# Patient Record
Sex: MALE | Race: WHITE | Employment: OTHER | URBAN - METROPOLITAN AREA
[De-identification: names, ages, dates, MRNs, and addresses within clinical notes are randomized per-mention and may not be internally consistent; named-entity substitution may affect disease eponyms.]

---

## 2017-01-04 ENCOUNTER — OFFICE VISIT (OUTPATIENT)
Dept: OPHTHALMOLOGY | Facility: CLINIC | Age: 77
End: 2017-01-04

## 2017-01-04 DIAGNOSIS — H40.051 OCULAR HYPERTENSION OF RIGHT EYE: ICD-10-CM

## 2017-01-04 DIAGNOSIS — H40.1121 PRIMARY OPEN ANGLE GLAUCOMA OF LEFT EYE, MILD STAGE: Primary | ICD-10-CM

## 2017-01-04 PROCEDURE — 99213 OFFICE O/P EST LOW 20 MIN: CPT | Performed by: OPHTHALMOLOGY

## 2017-01-04 PROCEDURE — G0463 HOSPITAL OUTPT CLINIC VISIT: HCPCS | Performed by: OPHTHALMOLOGY

## 2017-01-04 NOTE — ASSESSMENT & PLAN NOTE
Intraocular pressure stable, tolerating medications. Will continue same regimen of Latanoprost in both eyes at bedtime.     (he takes the drops in the left eye for glaucoma and in the right eye for ocular hypertension)   Will have patient back in 4 months

## 2017-01-04 NOTE — PROGRESS NOTES
Nasra Niece is a 78year old male. HPI:     HPI     Patient is here for an IOP check. He is taking Latanoprost OU qhs. Patient states no ocular complaints or changes.        Last edited by Liat Angeles O.T. on 1/4/2017 10:31 AM.     Patient His Problem Relation Age of Onset   • Cancer Father       age 67 lung cancer   • Stroke Father       age 67   • Lipids Brother    • Breast Cancer Mother 50   • Dementia Mother       age 80 pneumonia   • Other[other] [OTHER] Mother    • Diabetes B Pressure 20 21         Pupils      Pupils   Right PERRL   Left PERRL               Slit Lamp and Fundus Exam     Slit Lamp Exam      Right Left    Lids/Lashes Dermatochalasis, Meibomian gland dysfunction Dermatochalasis, Meibomian gland dysfunction    Co

## 2017-01-04 NOTE — PATIENT INSTRUCTIONS
Primary open angle glaucoma of left eye, mild stage   Intraocular pressure stable, tolerating medications. Will continue same regimen of Latanoprost in both eyes at bedtime.     (he takes the drops in the left eye for glaucoma and in the right eye for ocul

## 2017-01-25 ENCOUNTER — APPOINTMENT (OUTPATIENT)
Dept: LAB | Age: 77
End: 2017-01-25
Attending: INTERNAL MEDICINE
Payer: MEDICARE

## 2017-01-25 PROCEDURE — 36415 COLL VENOUS BLD VENIPUNCTURE: CPT | Performed by: INTERNAL MEDICINE

## 2017-01-25 PROCEDURE — 83036 HEMOGLOBIN GLYCOSYLATED A1C: CPT | Performed by: INTERNAL MEDICINE

## 2017-01-25 PROCEDURE — 80061 LIPID PANEL: CPT | Performed by: INTERNAL MEDICINE

## 2017-01-25 PROCEDURE — 80053 COMPREHEN METABOLIC PANEL: CPT | Performed by: INTERNAL MEDICINE

## 2017-02-21 ENCOUNTER — OFFICE VISIT (OUTPATIENT)
Dept: INTERNAL MEDICINE CLINIC | Facility: CLINIC | Age: 77
End: 2017-02-21

## 2017-02-21 VITALS
HEIGHT: 68.5 IN | TEMPERATURE: 98 F | OXYGEN SATURATION: 98 % | HEART RATE: 56 BPM | WEIGHT: 153 LBS | DIASTOLIC BLOOD PRESSURE: 56 MMHG | BODY MASS INDEX: 22.92 KG/M2 | SYSTOLIC BLOOD PRESSURE: 130 MMHG

## 2017-02-21 DIAGNOSIS — K21.9 GASTROESOPHAGEAL REFLUX DISEASE WITHOUT ESOPHAGITIS: ICD-10-CM

## 2017-02-21 DIAGNOSIS — Z12.5 PROSTATE CANCER SCREENING: ICD-10-CM

## 2017-02-21 DIAGNOSIS — Z85.46 H/O PROSTATE CANCER: ICD-10-CM

## 2017-02-21 DIAGNOSIS — E78.00 HYPERCHOLESTEROLEMIA: ICD-10-CM

## 2017-02-21 DIAGNOSIS — Z00.00 MEDICARE ANNUAL WELLNESS VISIT, SUBSEQUENT: Primary | ICD-10-CM

## 2017-02-21 DIAGNOSIS — K62.5 RECTAL BLEEDING: ICD-10-CM

## 2017-02-21 DIAGNOSIS — R73.03 PRE-DIABETES: ICD-10-CM

## 2017-02-21 PROCEDURE — G0463 HOSPITAL OUTPT CLINIC VISIT: HCPCS | Performed by: INTERNAL MEDICINE

## 2017-02-21 PROCEDURE — G0439 PPPS, SUBSEQ VISIT: HCPCS | Performed by: INTERNAL MEDICINE

## 2017-02-21 PROCEDURE — 99213 OFFICE O/P EST LOW 20 MIN: CPT | Performed by: INTERNAL MEDICINE

## 2017-02-21 RX ORDER — ROSUVASTATIN CALCIUM 40 MG/1
TABLET, COATED ORAL
Qty: 45 TABLET | Refills: 3 | Status: SHIPPED | OUTPATIENT
Start: 2017-02-21 | End: 2017-08-21

## 2017-02-21 NOTE — PROGRESS NOTES
Josefina Alves is a 78year old male who presents for     6 month check and medicare annual wellness. Feels good. Hyperlipidemia:  Tolerating crestor 40 mg 1/2 tab daily. Prediabetes: Follows diet.      Chronic lumbar disc disease:  No flares left eye 03/22/2016     on Latanoprost OU qhs   • Ocular hypertension of right eye 3/2016     taking Latanoprost OU qhs           Past Surgical History    TONSILLECTOMY  1947    Comment age 13    OTHER SURGICAL HISTORY  1980    Comment cystoscopy to remove 8.5\" (1.74 m)  Wt 153 lb (69.4 kg)  BMI 22.92 kg/m2  SpO2 98%     Wt Readings from Last 6 Encounters:  02/21/17 : 153 lb (69.4 kg)  08/29/16 : 148 lb (67.132 kg)  02/15/16 : 153 lb (69.4 kg)  02/03/16 : 156 lb (70.761 kg)  09/17/15 : 146 lb (66.225 kg)  0 M2h--atmbxkx ok. Lab-1/25/17-cmp lipid D2n-tnlqike ok. Ret in 6 mo with cbc cmp tsh lipid ua A1c psa. Patient expresses understanding of above issues and plan.          Current Outpatient Prescriptions:  Rosuvastatin Calcium (CRESTOR) 40 MG Oral Ta without help    Preparing your meals: Able without help    Managing money/bills: Able without help    Taking medications as prescribed: Able without help    Are you able to afford your medications?: Yes    Hearing Problems?: No     Functional Status     He

## 2017-03-27 RX ORDER — LATANOPROST 50 UG/ML
SOLUTION/ DROPS OPHTHALMIC
Qty: 1 BOTTLE | Refills: 11 | Status: SHIPPED | OUTPATIENT
Start: 2017-03-27 | End: 2018-01-16

## 2017-04-17 ENCOUNTER — TELEPHONE (OUTPATIENT)
Dept: INTERNAL MEDICINE CLINIC | Facility: CLINIC | Age: 77
End: 2017-04-17

## 2017-04-17 ENCOUNTER — OFFICE VISIT (OUTPATIENT)
Dept: INTERNAL MEDICINE CLINIC | Facility: CLINIC | Age: 77
End: 2017-04-17

## 2017-04-17 ENCOUNTER — HOSPITAL ENCOUNTER (OUTPATIENT)
Dept: ULTRASOUND IMAGING | Facility: HOSPITAL | Age: 77
Discharge: HOME | End: 2017-04-17
Attending: INTERNAL MEDICINE | Admitting: INTERNAL MEDICINE
Payer: MEDICARE

## 2017-04-17 VITALS
HEART RATE: 97 BPM | HEIGHT: 68 IN | OXYGEN SATURATION: 64 % | SYSTOLIC BLOOD PRESSURE: 118 MMHG | BODY MASS INDEX: 22.88 KG/M2 | RESPIRATION RATE: 18 BRPM | TEMPERATURE: 98 F | DIASTOLIC BLOOD PRESSURE: 62 MMHG | WEIGHT: 151 LBS

## 2017-04-17 DIAGNOSIS — M79.89 RIGHT LEG SWELLING: Primary | ICD-10-CM

## 2017-04-17 DIAGNOSIS — M79.89 RIGHT LEG SWELLING: ICD-10-CM

## 2017-04-17 DIAGNOSIS — M25.561 ACUTE PAIN OF RIGHT KNEE: ICD-10-CM

## 2017-04-17 PROCEDURE — 93971 EXTREMITY STUDY: CPT

## 2017-04-17 PROCEDURE — G0463 HOSPITAL OUTPT CLINIC VISIT: HCPCS | Performed by: INTERNAL MEDICINE

## 2017-04-17 PROCEDURE — 99213 OFFICE O/P EST LOW 20 MIN: CPT | Performed by: INTERNAL MEDICINE

## 2017-04-17 NOTE — TELEPHONE ENCOUNTER
Called patient who states right leg and ankle swollen, for 2 weeks discomfort , was on trip to [place] in February . Scheduled for today with DR. BAKER at 1 pm

## 2017-04-17 NOTE — TELEPHONE ENCOUNTER
Pt.'s right knee & ankle are swollen he thinks it may be a blood clot pt. Has an appt. For tomorrow at 4pm he would like to be seen today  Please advise ph.  # [phone]   Routed to clinical

## 2017-04-17 NOTE — PROGRESS NOTES
Alexa Ibarra is a 78year old male who presents for     L leg swollen. When walking at the [place] 2 wks ago in [facility], the R knee was sore behind the knee and medial aspect--was \"tight and sore\".    10 days ago, the R knee was sw Comment age 13    OTHER SURGICAL HISTORY  1980    Comment cystoscopy to remove kidney stones - comm hosp     OTHER      Comment egd - 11-99,5-02,6-07-Dr Jered Yuen    COLONOSCOPY  2007 2013    Comment Dr Jered Yuen at [facility] his derm. Pt also points out a skin lesion mole about 1/2 cm raised inhomogenous on upper mid chest.   He will see his derm Dr. Rachele Murphy. ASSESSMENT AND PLAN:     1.  Right leg swelling  - US VENOUS DOPPLER LEG RIGHT - DIAG IMG ([id number])

## 2017-05-08 PROBLEM — M23.90 INTERNAL DERANGEMENT OF KNEE: Status: ACTIVE | Noted: 2017-05-08

## 2017-05-08 PROBLEM — M94.261 CHONDROMALACIA, RIGHT KNEE: Status: ACTIVE | Noted: 2017-05-08

## 2017-05-08 PROBLEM — M25.561 ACUTE PAIN OF RIGHT KNEE: Status: ACTIVE | Noted: 2017-05-08

## 2017-05-09 ENCOUNTER — OFFICE VISIT (OUTPATIENT)
Dept: OPHTHALMOLOGY | Facility: CLINIC | Age: 77
End: 2017-05-09

## 2017-05-09 DIAGNOSIS — H43.391 FLOATER, VITREOUS, RIGHT: ICD-10-CM

## 2017-05-09 DIAGNOSIS — H40.1121 PRIMARY OPEN ANGLE GLAUCOMA OF LEFT EYE, MILD STAGE: Primary | ICD-10-CM

## 2017-05-09 DIAGNOSIS — H25.13 AGE-RELATED NUCLEAR CATARACT OF BOTH EYES: ICD-10-CM

## 2017-05-09 DIAGNOSIS — H40.051 OCULAR HYPERTENSION OF RIGHT EYE: ICD-10-CM

## 2017-05-09 PROBLEM — H43.399 FLOATER, VITREOUS: Status: ACTIVE | Noted: 2017-05-09

## 2017-05-09 PROCEDURE — 92083 EXTENDED VISUAL FIELD XM: CPT | Performed by: OPHTHALMOLOGY

## 2017-05-09 PROCEDURE — 92015 DETERMINE REFRACTIVE STATE: CPT | Performed by: OPHTHALMOLOGY

## 2017-05-09 PROCEDURE — 92133 CPTRZD OPH DX IMG PST SGM ON: CPT | Performed by: OPHTHALMOLOGY

## 2017-05-09 PROCEDURE — 92014 COMPRE OPH EXAM EST PT 1/>: CPT | Performed by: OPHTHALMOLOGY

## 2017-05-09 NOTE — ASSESSMENT & PLAN NOTE
Intraocular pressure stable, tolerating medications. Will continue same regimen of Latanoprost in both eyes at bedtime.     (he takes the drops in the left eye for glaucoma and in the right eye for ocular hypertension)   Visual field and OCT completed in

## 2017-05-09 NOTE — PROGRESS NOTES
Elenor Cowden is a 78year old male. HPI:     HPI     Patient is here for a VF, OCT and an eye exam. He is taking Latanoprost qhs OU as directed. Patient states that he has a decrease in his distance for the past year.        Last edited by Otoniel Taylor, Mark Anthony Carlisle); colonoscopy (2007) (Dr Mark Anthony Carlisle at [facility]); and remv prostate,retropub,radical () (prostatectomy for prostate cancer Dr. Noe Vázquez at [facility]).     Family History   Problem Relation Age of Onset   • Cancer Father       age 67 lung canc Base Eye Exam     Visual Acuity (Snellen - Linear)      Right Left   Dist cc 20/30 -2 20/30 -2   Dist ph cc 20/25- 20/25-   Near cc 20/25 20/20       Correction:  Glasses      Tonometry (Applanation, 10:26 AM)      Right Left   Pressure 20 22         P drops in the left eye for glaucoma and in the right eye for ocular hypertension)   Visual field and OCT completed in office today with stable results that were discussed with patient in office today.             Ocular hypertension  Continue Latanoprost in

## 2017-05-15 PROBLEM — S83.209A ACUTE MENISCAL TEAR OF KNEE: Status: ACTIVE | Noted: 2017-05-15

## 2017-05-15 PROBLEM — M17.10 PRIMARY OSTEOARTHRITIS OF ONE KNEE: Status: ACTIVE | Noted: 2017-05-15

## 2017-06-16 RX ORDER — ROSUVASTATIN CALCIUM 40 MG/1
TABLET, COATED ORAL
Qty: 45 TABLET | Refills: 3 | OUTPATIENT
Start: 2017-06-16

## 2017-06-16 RX ORDER — VALACYCLOVIR HYDROCHLORIDE 1 G/1
TABLET, FILM COATED ORAL
Qty: 8 TABLET | Refills: 1 | Status: SHIPPED | OUTPATIENT
Start: 2017-06-16 | End: 2017-08-21

## 2017-06-16 NOTE — TELEPHONE ENCOUNTER
I refilled valtrex 2 gm q 12 hours for 2 doses if needed for cold sore. #8 of 1 gm tabs with 1 refill. [facility]    Nurses had sent refill for crestor 40 mg take 1/2 tab daily #45 with 3 refills,

## 2017-06-20 ENCOUNTER — TELEPHONE (OUTPATIENT)
Dept: INTERNAL MEDICINE CLINIC | Facility: CLINIC | Age: 77
End: 2017-06-20

## 2017-06-23 NOTE — TELEPHONE ENCOUNTER
Patient called back, states he gets Crestor 40 mg tabs and cuts them in half, on 20 mg daily but 40 mg tabs are cheaper. Refill not needed at this time, sent 2/21/17 by Dayna Perez for 1 year.

## 2017-08-10 ENCOUNTER — LAB ENCOUNTER (OUTPATIENT)
Dept: LAB | Age: 77
End: 2017-08-10
Attending: INTERNAL MEDICINE
Payer: MEDICARE

## 2017-08-10 ENCOUNTER — TELEPHONE (OUTPATIENT)
Dept: INTERNAL MEDICINE CLINIC | Facility: CLINIC | Age: 77
End: 2017-08-10

## 2017-08-10 DIAGNOSIS — E78.00 HYPERCHOLESTEROLEMIA: ICD-10-CM

## 2017-08-10 DIAGNOSIS — D64.9 MILD ANEMIA: ICD-10-CM

## 2017-08-10 DIAGNOSIS — R73.03 PRE-DIABETES: ICD-10-CM

## 2017-08-10 DIAGNOSIS — Z12.5 PROSTATE CANCER SCREENING: ICD-10-CM

## 2017-08-10 DIAGNOSIS — D64.9 MILD ANEMIA: Primary | ICD-10-CM

## 2017-08-10 LAB
ALBUMIN SERPL BCP-MCNC: 3.8 G/DL (ref 3.5–4.8)
ALBUMIN/GLOB SERPL: 1.6 {RATIO} (ref 1–2)
ALP SERPL-CCNC: 48 U/L (ref 32–100)
ALT SERPL-CCNC: 26 U/L (ref 17–63)
ANION GAP SERPL CALC-SCNC: 6 MMOL/L (ref 0–18)
AST SERPL-CCNC: 29 U/L (ref 15–41)
BASOPHILS # BLD: 0 K/UL (ref 0–0.2)
BASOPHILS NFR BLD: 1 %
BILIRUB SERPL-MCNC: 0.5 MG/DL (ref 0.3–1.2)
BILIRUB UR QL: NEGATIVE
BUN SERPL-MCNC: 8 MG/DL (ref 8–20)
BUN/CREAT SERPL: 8.2 (ref 10–20)
CALCIUM SERPL-MCNC: 9 MG/DL (ref 8.5–10.5)
CHLORIDE SERPL-SCNC: 106 MMOL/L (ref 95–110)
CHOLEST SERPL-MCNC: 139 MG/DL (ref 110–200)
CLARITY UR: CLEAR
CO2 SERPL-SCNC: 28 MMOL/L (ref 22–32)
COLOR UR: YELLOW
CREAT SERPL-MCNC: 0.98 MG/DL (ref 0.5–1.5)
EOSINOPHIL # BLD: 0.2 K/UL (ref 0–0.7)
EOSINOPHIL NFR BLD: 2 %
ERYTHROCYTE [DISTWIDTH] IN BLOOD BY AUTOMATED COUNT: 13.8 % (ref 11–15)
FERRITIN SERPL IA-MCNC: 90 NG/ML (ref 24–336)
GLOBULIN PLAS-MCNC: 2.4 G/DL (ref 2.5–3.7)
GLUCOSE SERPL-MCNC: 101 MG/DL (ref 70–99)
GLUCOSE UR-MCNC: NEGATIVE MG/DL
HBA1C MFR BLD: 6 % (ref 4–6)
HCT VFR BLD AUTO: 38.5 % (ref 41–52)
HDLC SERPL-MCNC: 56 MG/DL
HGB BLD-MCNC: 13 G/DL (ref 13.5–17.5)
HGB UR QL STRIP.AUTO: NEGATIVE
HYALINE CASTS #/AREA URNS AUTO: 1 /LPF
IRON SATN MFR SERPL: 16 % (ref 20–55)
IRON SERPL-MCNC: 47 MCG/DL (ref 45–182)
KETONES UR-MCNC: NEGATIVE MG/DL
LDLC SERPL CALC-MCNC: 69 MG/DL (ref 0–99)
LEUKOCYTE ESTERASE UR QL STRIP.AUTO: NEGATIVE
LYMPHOCYTES # BLD: 2.6 K/UL (ref 1–4)
LYMPHOCYTES NFR BLD: 37 %
MCH RBC QN AUTO: 29.7 PG (ref 27–32)
MCHC RBC AUTO-ENTMCNC: 33.8 G/DL (ref 32–37)
MCV RBC AUTO: 88.1 FL (ref 80–100)
MONOCYTES # BLD: 0.6 K/UL (ref 0–1)
MONOCYTES NFR BLD: 8 %
NEUTROPHILS # BLD AUTO: 3.8 K/UL (ref 1.8–7.7)
NEUTROPHILS NFR BLD: 53 %
NITRITE UR QL STRIP.AUTO: NEGATIVE
NONHDLC SERPL-MCNC: 83 MG/DL
OSMOLALITY UR CALC.SUM OF ELEC: 288 MOSM/KG (ref 275–295)
PH UR: 8 [PH] (ref 5–8)
PLATELET # BLD AUTO: 253 K/UL (ref 140–400)
PMV BLD AUTO: 8.2 FL (ref 7.4–10.3)
POTASSIUM SERPL-SCNC: 4.4 MMOL/L (ref 3.3–5.1)
PROT SERPL-MCNC: 6.2 G/DL (ref 5.9–8.4)
PROT UR-MCNC: NEGATIVE MG/DL
PSA SERPL-MCNC: 0 NG/ML (ref 0–4)
RBC # BLD AUTO: 4.37 M/UL (ref 4.5–5.9)
RBC #/AREA URNS AUTO: 1 /HPF
SODIUM SERPL-SCNC: 140 MMOL/L (ref 136–144)
SP GR UR STRIP: 1.01 (ref 1–1.03)
TIBC SERPL-MCNC: 286 MCG/DL (ref 228–428)
TRANSFERRIN SERPL-MCNC: 217 MG/DL (ref 180–329)
TRIGL SERPL-MCNC: 72 MG/DL (ref 1–149)
TSH SERPL-ACNC: 2.05 UIU/ML (ref 0.45–5.33)
UROBILINOGEN UR STRIP-ACNC: <2
VIT B12 SERPL-MCNC: 439 PG/ML (ref 181–914)
VIT C UR-MCNC: NEGATIVE MG/DL
WBC # BLD AUTO: 7.2 K/UL (ref 4–11)
WBC #/AREA URNS AUTO: <1 /HPF

## 2017-08-10 PROCEDURE — 84443 ASSAY THYROID STIM HORMONE: CPT

## 2017-08-10 PROCEDURE — 83036 HEMOGLOBIN GLYCOSYLATED A1C: CPT

## 2017-08-10 PROCEDURE — 84466 ASSAY OF TRANSFERRIN: CPT

## 2017-08-10 PROCEDURE — 83540 ASSAY OF IRON: CPT

## 2017-08-10 PROCEDURE — 81003 URINALYSIS AUTO W/O SCOPE: CPT

## 2017-08-10 PROCEDURE — 82728 ASSAY OF FERRITIN: CPT

## 2017-08-10 PROCEDURE — 82607 VITAMIN B-12: CPT

## 2017-08-10 PROCEDURE — 85025 COMPLETE CBC W/AUTO DIFF WBC: CPT

## 2017-08-10 PROCEDURE — 80061 LIPID PANEL: CPT

## 2017-08-10 PROCEDURE — 36415 COLL VENOUS BLD VENIPUNCTURE: CPT

## 2017-08-10 PROCEDURE — 80053 COMPREHEN METABOLIC PANEL: CPT

## 2017-08-10 NOTE — TELEPHONE ENCOUNTER
I note lab results of today-8/10/17-cbc cmp tsh lipid ua psa--results ok except hgb 13- was 14 in 8/2016. check fe/tibc, ferritin. B12. Orders added to existing specimen with dx mild anemia. Pt has appt 8/21/17.

## 2017-08-21 ENCOUNTER — OFFICE VISIT (OUTPATIENT)
Dept: INTERNAL MEDICINE CLINIC | Facility: CLINIC | Age: 77
End: 2017-08-21

## 2017-08-21 ENCOUNTER — TELEPHONE (OUTPATIENT)
Dept: INTERNAL MEDICINE CLINIC | Facility: CLINIC | Age: 77
End: 2017-08-21

## 2017-08-21 VITALS
DIASTOLIC BLOOD PRESSURE: 63 MMHG | HEART RATE: 61 BPM | OXYGEN SATURATION: 98 % | WEIGHT: 149 LBS | SYSTOLIC BLOOD PRESSURE: 118 MMHG | HEIGHT: 68 IN | BODY MASS INDEX: 22.58 KG/M2 | TEMPERATURE: 98 F

## 2017-08-21 DIAGNOSIS — E78.00 HYPERCHOLESTEROLEMIA: ICD-10-CM

## 2017-08-21 DIAGNOSIS — K64.9 HEMORRHOIDS, UNSPECIFIED HEMORRHOID TYPE: ICD-10-CM

## 2017-08-21 DIAGNOSIS — D64.9 MILD ANEMIA: Primary | ICD-10-CM

## 2017-08-21 PROBLEM — M17.11 PRIMARY OSTEOARTHRITIS OF ONE KNEE, RIGHT: Status: ACTIVE | Noted: 2017-05-15

## 2017-08-21 PROBLEM — M23.91 INTERNAL DERANGEMENT OF KNEE, RIGHT: Status: ACTIVE | Noted: 2017-05-08

## 2017-08-21 PROCEDURE — 99214 OFFICE O/P EST MOD 30 MIN: CPT | Performed by: INTERNAL MEDICINE

## 2017-08-21 PROCEDURE — G0463 HOSPITAL OUTPT CLINIC VISIT: HCPCS | Performed by: INTERNAL MEDICINE

## 2017-08-21 RX ORDER — VALACYCLOVIR HYDROCHLORIDE 1 G/1
TABLET, FILM COATED ORAL
Qty: 8 TABLET | Refills: 1 | Status: SHIPPED | OUTPATIENT
Start: 2017-08-21 | End: 2018-08-24

## 2017-08-21 RX ORDER — ROSUVASTATIN CALCIUM 20 MG/1
20 TABLET, COATED ORAL NIGHTLY
Qty: 90 TABLET | Refills: 3 | Status: SHIPPED | OUTPATIENT
Start: 2017-08-21 | End: 2018-08-06

## 2017-08-21 NOTE — TELEPHONE ENCOUNTER
Nursing, please tell patient, yes, start the multivitamin with daily. Check CBC in 1 month as we discussed at his visit today. Thanks.

## 2017-08-21 NOTE — TELEPHONE ENCOUNTER
Pt wanted to let  know that his multi-vitamins do not contain iron. He bought another container that has 18 mg iron (100%DV) and he will be taking those from now on.          Tasked to nursing

## 2017-08-21 NOTE — PROGRESS NOTES
Stephenie Cardenas is a 78year old male who presents for     6 month check      Feels good.      Hyperlipidemia-Tolerating crestor 40 mg 1/2 tab daily.     Prediabetes-Follows diet.      Saw Dr Ghassan Stock for R knee pain in May.   MRI R knee 5/2017 per Dr. Cale Martinez thinning of OCT OS- treating OHT OD and COAG OS   • Prostate cancer ([address] 2000    prostatectomy at [address] [place] [address] Road   • Skin lesion of face 2015    sees derm Dr. Nura Davis in [facility]   • Superficial punctate keratitis 2010    left   • Thoracic spondy 98.4 °F (36.9 °C) (Oral)   Ht 5' 8\" (1.727 m)   Wt 149 lb (67.6 kg)   SpO2 98%   BMI 22.66 kg/m²      Wt Readings from Last 6 Encounters:  08/21/17 : 149 lb (67.6 kg)  04/17/17 : 151 lb (68.5 kg)  02/21/17 : 153 lb (69.4 kg)  08/29/16 : 148 lb (67.1 kg) up--hx of prostate CA prostatectomy in 2000.    PSA 0.0 on 8/10/17.      Lab 8/23/16- cbc cmp tsh lipid ua M3y--lnejehy ok. Lab-1/25/17-cmp lipid A5s-unyrsyy ok. Lab 8/10/17-cbc cmp tsh lipid ua psa--results ok except hgb 13- was 14 in 8/2016.   fe sat

## 2017-08-22 ENCOUNTER — TELEPHONE (OUTPATIENT)
Dept: INTERNAL MEDICINE CLINIC | Facility: CLINIC | Age: 77
End: 2017-08-22

## 2017-08-22 NOTE — TELEPHONE ENCOUNTER
Message noted--next colonoscopy in 5 years. Colonoscopy at [facility] Dr. Fozia Mays 8/7/17--2 gwyn polyps, int hem and diverticulosis.

## 2017-08-22 NOTE — TELEPHONE ENCOUNTER
Pt got the results from his colonoscopy - everything is OK. His next one will be in 5 years.            Tasked to nursing as Marie Saunders

## 2017-09-12 ENCOUNTER — OFFICE VISIT (OUTPATIENT)
Dept: OPHTHALMOLOGY | Facility: CLINIC | Age: 77
End: 2017-09-12

## 2017-09-12 DIAGNOSIS — H40.1121 PRIMARY OPEN ANGLE GLAUCOMA OF LEFT EYE, MILD STAGE: Primary | ICD-10-CM

## 2017-09-12 DIAGNOSIS — H40.051 OCULAR HYPERTENSION OF RIGHT EYE: ICD-10-CM

## 2017-09-12 PROCEDURE — 99213 OFFICE O/P EST LOW 20 MIN: CPT | Performed by: OPHTHALMOLOGY

## 2017-09-12 PROCEDURE — G0463 HOSPITAL OUTPT CLINIC VISIT: HCPCS | Performed by: OPHTHALMOLOGY

## 2017-09-12 NOTE — PROGRESS NOTES
Tuyet Eric is a 78year old male. HPI:     HPI     Pt is here for a 4 month IOP check. Pt is using Lantanoprost OU qhs and does not need any refills at this time. Pt denies any ocular complaints and he is happy with his glasses.     Last edited by (age 13); other surgical history (1980) (cystoscopy to remove kidney stones -[facility] hosp ); other (egd - 11-99,5-02,6-07-Dr Sherry Carver); colonoscopy (2007 2013) (Dr Sherry Carver at [facility]); and remv prostate,retropub,radical (2000) (prostatectomy for prostate ca (CENTRUM SILVER OR) Take  by mouth daily. Disp:  Rfl:    Lansoprazole (PREVACID) 15 MG Oral Capsule Delayed Release Take 15 mg by mouth daily. Disp:  Rfl:    Aspirin (ECOTRIN LOW STRENGTH) 81 MG Oral Tab EC Take 81 mg by mouth daily.  Disp:  Rfl:        All placed in this encounter. Meds This Visit:    No prescriptions requested or ordered in this encounter     Follow up instructions:  Return in about 4 months (around 1/12/2018) for IOP check.     9/12/2017  Scribed by: Patsie Nageotte, MD

## 2017-09-12 NOTE — ASSESSMENT & PLAN NOTE
Intraocular pressure stable, tolerating medications. Will continue same regimen of Latanoprost in both eyes at bedtime.     (he takes the drops in the left eye for glaucoma and in the right eye for ocular hypertension)

## 2017-09-25 ENCOUNTER — IMMUNIZATION (OUTPATIENT)
Dept: INTERNAL MEDICINE CLINIC | Facility: CLINIC | Age: 77
End: 2017-09-25

## 2017-09-25 DIAGNOSIS — Z23 NEED FOR VACCINATION: ICD-10-CM

## 2017-09-25 PROCEDURE — G0008 ADMIN INFLUENZA VIRUS VAC: HCPCS | Performed by: INTERNAL MEDICINE

## 2017-09-25 PROCEDURE — 90653 IIV ADJUVANT VACCINE IM: CPT | Performed by: INTERNAL MEDICINE

## 2017-09-29 ENCOUNTER — TELEPHONE (OUTPATIENT)
Dept: INTERNAL MEDICINE CLINIC | Facility: CLINIC | Age: 77
End: 2017-09-29

## 2017-09-29 NOTE — TELEPHONE ENCOUNTER
To nursing tell patient--please call [phone]---that I filled out the medical advisor's opinion form for [facility] [employer] [facility] to [place]. (pt leaving 2/4/18). He said he would  the form. Please keep a copy for Epic. Thanks.

## 2017-10-02 ENCOUNTER — LAB ENCOUNTER (OUTPATIENT)
Dept: LAB | Age: 77
End: 2017-10-02
Attending: INTERNAL MEDICINE
Payer: MEDICARE

## 2017-10-02 DIAGNOSIS — D64.9 MILD ANEMIA: ICD-10-CM

## 2017-10-02 PROCEDURE — 36415 COLL VENOUS BLD VENIPUNCTURE: CPT

## 2017-10-02 PROCEDURE — 85025 COMPLETE CBC W/AUTO DIFF WBC: CPT

## 2017-10-02 NOTE — TELEPHONE ENCOUNTER
Advised patient form is ready for PU. Patient request form to be faxed to [phone]. Form faxed, conformation received. Copy sent to scan. Original placed at  for PU.

## 2017-10-03 ENCOUNTER — TELEPHONE (OUTPATIENT)
Dept: INTERNAL MEDICINE CLINIC | Facility: CLINIC | Age: 77
End: 2017-10-03

## 2017-10-03 NOTE — TELEPHONE ENCOUNTER
Dr Orellana Cons can you advise on which test/lab results or notes would be beneficial for Dr Shahla Abraham office?   Fax [phone]

## 2017-10-03 NOTE — TELEPHONE ENCOUNTER
To nursing, send results to Dr. Smita Stephenson of CBC 10/2/17 and 8/20/17 and 8/23/16. Also iron, TIBC iron saturation, transferrin, vitamin B12 and ferritin results of 8/10/17. Also a copy of this telephone template. Thanks.

## 2017-10-03 NOTE — TELEPHONE ENCOUNTER
The following has been faxed to Dr. Yanci Ballesteros office at [phone]  - CBC - 10/2/17, 08/10/17, 08/23/16  - Iron, TIBC Iron Saturation, Transferrin, Vitamin b12, and Ferritin - 8/10/17  - Copy of this telephone template    Fax confirmation received.

## 2017-10-03 NOTE — TELEPHONE ENCOUNTER
Called patient and relayed Dr Lavelle Regalado message. Patient verbalized understanding of entire message with no further questions.

## 2017-10-03 NOTE — TELEPHONE ENCOUNTER
To nursing, please tell patient CBC done on 10/2/17– results are normal.  There is no anemia. His hemoglobin is 13.8 up from 13.0 on 8/10/17. Continue the multivitamin with iron daily.     Since he responded well to iron, and he has already had a recent c

## 2018-01-02 ENCOUNTER — OFFICE VISIT (OUTPATIENT)
Dept: INTERNAL MEDICINE CLINIC | Facility: CLINIC | Age: 78
End: 2018-01-02

## 2018-01-02 VITALS
BODY MASS INDEX: 23.49 KG/M2 | SYSTOLIC BLOOD PRESSURE: 120 MMHG | TEMPERATURE: 98 F | HEIGHT: 68 IN | HEART RATE: 72 BPM | WEIGHT: 155 LBS | DIASTOLIC BLOOD PRESSURE: 68 MMHG

## 2018-01-02 DIAGNOSIS — M54.31 SCIATICA, RIGHT SIDE: Primary | ICD-10-CM

## 2018-01-02 PROCEDURE — 99213 OFFICE O/P EST LOW 20 MIN: CPT | Performed by: INTERNAL MEDICINE

## 2018-01-02 PROCEDURE — G0463 HOSPITAL OUTPT CLINIC VISIT: HCPCS | Performed by: INTERNAL MEDICINE

## 2018-01-02 RX ORDER — METHYLPREDNISOLONE 4 MG/1
TABLET ORAL
Qty: 1 KIT | Refills: 0 | Status: SHIPPED | OUTPATIENT
Start: 2018-01-02 | End: 2018-01-15 | Stop reason: ALTCHOICE

## 2018-01-02 NOTE — PROGRESS NOTES
Olvin Cross is a 78year old male who presents for     Right sciatica  R buttock pain started a month ago--was only occasional. After [place], \"the frequency has picked up. \"  Travels down R leg to knee. No numbness or tingling. Not tried any meds. Alexander De La Fuente   • Skin lesion of face 2015    sees derm Dr. Jason Warner in [facility]   • Superficial punctate keratitis 2010    left   • Thoracic spondylosis       Past Surgical History:  2007 2013: COLONOSCOPY      Comment: Dr Deondre Herron at [facility]  No date: OTHER spine but pain at extreme flexion. LE's motor intact. SLR neg. Neurologic: alert and oriented                  ASSESSMENT AND PLAN:     Sciatica, right side  Rx medrol dose pack. Pt notes medrol helped in the past.    Order given for PT.    Discussed

## 2018-01-10 ENCOUNTER — TELEPHONE (OUTPATIENT)
Dept: INTERNAL MEDICINE CLINIC | Facility: CLINIC | Age: 78
End: 2018-01-10

## 2018-01-10 ENCOUNTER — HOSPITAL ENCOUNTER (OUTPATIENT)
Dept: MRI IMAGING | Facility: HOSPITAL | Age: 78
Discharge: HOME | End: 2018-01-10
Attending: ORTHOPAEDIC SURGERY
Payer: MEDICARE

## 2018-01-10 DIAGNOSIS — M51.36 DDD (DEGENERATIVE DISC DISEASE), LUMBAR: ICD-10-CM

## 2018-01-10 DIAGNOSIS — M51.26 BULGING OF LUMBAR INTERVERTEBRAL DISC: ICD-10-CM

## 2018-01-10 PROCEDURE — 72148 MRI LUMBAR SPINE W/O DYE: CPT | Performed by: ORTHOPAEDIC SURGERY

## 2018-01-10 NOTE — TELEPHONE ENCOUNTER
Pt. Stated he got up Friday and couldn't walk. Pt. Saw Dr. Yazan Davison on Monday he ordered x-rays & a MRI  Pt. States pain has subsided but he is still having trouble walking around ph.  # [phone]   Routed to clinical

## 2018-01-10 NOTE — TELEPHONE ENCOUNTER
I note message from patient. Saw Dr. Zuleta Persons who ordered MRI lumbar for R sciatica. I note MRI lumbar spine results from today 1/10/18–  1.  Mild scoliosis and multilevel degenerative changes of the lumbar spine with moderate to severe spinal canal sten

## 2018-01-10 NOTE — TELEPHONE ENCOUNTER
To DR OLIVIA DOCKERY   Pt had Mri today   Results should be ready on FR  Has appt with DR Jimena Malin on Mon   Will call DR BAKER  On Mon afternoon to keep DR BAKER  in the loop re Dr Jimena Malin rec

## 2018-01-15 PROBLEM — M48.062 SPINAL STENOSIS OF LUMBAR REGION WITH NEUROGENIC CLAUDICATION: Status: ACTIVE | Noted: 2018-01-15

## 2018-01-16 ENCOUNTER — OFFICE VISIT (OUTPATIENT)
Dept: OPHTHALMOLOGY | Facility: CLINIC | Age: 78
End: 2018-01-16

## 2018-01-16 DIAGNOSIS — H40.051 OCULAR HYPERTENSION OF RIGHT EYE: ICD-10-CM

## 2018-01-16 DIAGNOSIS — H40.1121 PRIMARY OPEN ANGLE GLAUCOMA OF LEFT EYE, MILD STAGE: Primary | ICD-10-CM

## 2018-01-16 PROCEDURE — G0463 HOSPITAL OUTPT CLINIC VISIT: HCPCS | Performed by: OPHTHALMOLOGY

## 2018-01-16 PROCEDURE — 99213 OFFICE O/P EST LOW 20 MIN: CPT | Performed by: OPHTHALMOLOGY

## 2018-01-16 RX ORDER — LATANOPROST 50 UG/ML
SOLUTION/ DROPS OPHTHALMIC
Qty: 3 BOTTLE | Refills: 3 | Status: SHIPPED | OUTPATIENT
Start: 2018-01-16 | End: 2018-09-26

## 2018-01-16 NOTE — PROGRESS NOTES
Alma Toledo is a 78year old male. HPI:     HPI     Pt is using Latanoprost OU qhs as directed. Pt states that his vision is stable and has no ocular complaints.      Last edited by Gabriel Brar OTERESA on 1/16/2018 11:02 AM. (History)        Munir Manriquez kidney stones -[place] [facility] ); other (egd - 11-99,5-02,6-07-Dr Mitch Vigil); colonoscopy (2007 2013) (Dr Mitch Vigil at [facility]); and remv prostate,retropub,radical (2000) (prostatectomy for prostate cancer Dr. Meri Sharpe at [place]).     Family History   Problem Rel MG Oral Tab EC Take 81 mg by mouth daily.  Disp:  Rfl:        Allergies:  No Known Allergies    ROS:       PHYSICAL EXAM:     Base Eye Exam     Visual Acuity (Snellen - Linear)       Right Left    Dist cc 20/25 20/25    Correction:  Glasses          Tonomet photos.     1/16/2018  Scribed by: Calista Estrada MD

## 2018-01-18 PROBLEM — M43.16 SPONDYLOLISTHESIS AT L4-L5 LEVEL: Status: ACTIVE | Noted: 2018-01-18

## 2018-01-22 ENCOUNTER — TELEPHONE (OUTPATIENT)
Dept: INTERNAL MEDICINE CLINIC | Facility: CLINIC | Age: 78
End: 2018-01-22

## 2018-01-22 ENCOUNTER — TELEPHONE (OUTPATIENT)
Dept: OPHTHALMOLOGY | Facility: CLINIC | Age: 78
End: 2018-01-22

## 2018-01-22 RX ORDER — SCOLOPAMINE TRANSDERMAL SYSTEM 1 MG/1
1 PATCH, EXTENDED RELEASE TRANSDERMAL
Qty: 4 PATCH | Refills: 0 | Status: SHIPPED | OUTPATIENT
Start: 2018-01-22 | End: 2018-02-26

## 2018-01-22 NOTE — TELEPHONE ENCOUNTER
Patient had gonioscopy on 4/19/16 with results of open angles to 360 degrees OU. Routed to physician.

## 2018-01-22 NOTE — TELEPHONE ENCOUNTER
Pt. Is calling to inform Dr. Emili Jansen that he had his injection last Thursday and it is helping his second injection is on 01/30. Pt. Will start PT three days this week & 3 days next week.  Pt. Will be going to [place] and needs to be prescribed a patch for se

## 2018-01-22 NOTE — TELEPHONE ENCOUNTER
To nursing, please tell patient I sent to [facility] in [place] prescription for Transderm scop patches–apply a patch behind ear every 3 days if needed for motion sickness. #4.  Thanks.

## 2018-01-23 NOTE — TELEPHONE ENCOUNTER
Per Dr. Emmie Vazquez. No contraindication to Dramamine. I relayed this information to Dr. Emmie Vazquez.

## 2018-02-20 ENCOUNTER — TELEPHONE (OUTPATIENT)
Dept: INTERNAL MEDICINE CLINIC | Facility: CLINIC | Age: 78
End: 2018-02-20

## 2018-02-20 ENCOUNTER — LAB ENCOUNTER (OUTPATIENT)
Dept: LAB | Age: 78
End: 2018-02-20
Attending: INTERNAL MEDICINE
Payer: MEDICARE

## 2018-02-20 DIAGNOSIS — E78.00 HYPERCHOLESTEROLEMIA: Primary | ICD-10-CM

## 2018-02-20 DIAGNOSIS — R73.03 PREDIABETES: ICD-10-CM

## 2018-02-20 DIAGNOSIS — E78.00 HYPERCHOLESTEROLEMIA: ICD-10-CM

## 2018-02-20 LAB
ALBUMIN SERPL BCP-MCNC: 4.3 G/DL (ref 3.5–4.8)
ALBUMIN/GLOB SERPL: 1.9 {RATIO} (ref 1–2)
ALP SERPL-CCNC: 50 U/L (ref 32–100)
ALT SERPL-CCNC: 21 U/L (ref 17–63)
ANION GAP SERPL CALC-SCNC: 9 MMOL/L (ref 0–18)
AST SERPL-CCNC: 22 U/L (ref 15–41)
BASOPHILS # BLD: 0 K/UL (ref 0–0.2)
BASOPHILS NFR BLD: 0 %
BILIRUB SERPL-MCNC: 0.7 MG/DL (ref 0.3–1.2)
BUN SERPL-MCNC: 16 MG/DL (ref 8–20)
BUN/CREAT SERPL: 15.4 (ref 10–20)
CALCIUM SERPL-MCNC: 9.1 MG/DL (ref 8.5–10.5)
CHLORIDE SERPL-SCNC: 101 MMOL/L (ref 95–110)
CHOLEST SERPL-MCNC: 186 MG/DL (ref 110–200)
CO2 SERPL-SCNC: 26 MMOL/L (ref 22–32)
CREAT SERPL-MCNC: 1.04 MG/DL (ref 0.5–1.5)
EOSINOPHIL # BLD: 0.1 K/UL (ref 0–0.7)
EOSINOPHIL NFR BLD: 1 %
ERYTHROCYTE [DISTWIDTH] IN BLOOD BY AUTOMATED COUNT: 13.9 % (ref 11–15)
GLOBULIN PLAS-MCNC: 2.3 G/DL (ref 2.5–3.7)
GLUCOSE SERPL-MCNC: 104 MG/DL (ref 70–99)
HBA1C MFR BLD: 5.8 % (ref 4–6)
HCT VFR BLD AUTO: 43 % (ref 41–52)
HDLC SERPL-MCNC: 62 MG/DL
HGB BLD-MCNC: 14.5 G/DL (ref 13.5–17.5)
LDLC SERPL CALC-MCNC: 107 MG/DL (ref 0–99)
LYMPHOCYTES # BLD: 2.6 K/UL (ref 1–4)
LYMPHOCYTES NFR BLD: 40 %
MCH RBC QN AUTO: 30 PG (ref 27–32)
MCHC RBC AUTO-ENTMCNC: 33.8 G/DL (ref 32–37)
MCV RBC AUTO: 88.8 FL (ref 80–100)
MONOCYTES # BLD: 0.5 K/UL (ref 0–1)
MONOCYTES NFR BLD: 8 %
NEUTROPHILS # BLD AUTO: 3.3 K/UL (ref 1.8–7.7)
NEUTROPHILS NFR BLD: 51 %
NONHDLC SERPL-MCNC: 124 MG/DL
OSMOLALITY UR CALC.SUM OF ELEC: 283 MOSM/KG (ref 275–295)
PATIENT FASTING: YES
PLATELET # BLD AUTO: 263 K/UL (ref 140–400)
PMV BLD AUTO: 7.6 FL (ref 7.4–10.3)
POTASSIUM SERPL-SCNC: 4.6 MMOL/L (ref 3.3–5.1)
PROT SERPL-MCNC: 6.6 G/DL (ref 5.9–8.4)
RBC # BLD AUTO: 4.84 M/UL (ref 4.5–5.9)
SODIUM SERPL-SCNC: 136 MMOL/L (ref 136–144)
TRIGL SERPL-MCNC: 86 MG/DL (ref 1–149)
WBC # BLD AUTO: 6.5 K/UL (ref 4–11)

## 2018-02-20 PROCEDURE — 80053 COMPREHEN METABOLIC PANEL: CPT

## 2018-02-20 PROCEDURE — 36415 COLL VENOUS BLD VENIPUNCTURE: CPT

## 2018-02-20 PROCEDURE — 83036 HEMOGLOBIN GLYCOSYLATED A1C: CPT

## 2018-02-20 PROCEDURE — 85025 COMPLETE CBC W/AUTO DIFF WBC: CPT

## 2018-02-20 PROCEDURE — 80061 LIPID PANEL: CPT

## 2018-02-20 NOTE — TELEPHONE ENCOUNTER
lab orders entered before 2/27/18 appt-cbc cmp lipid A1c. Ashley Talamantes RN is notifying phlebotomy that orders are in.     1. Hypercholesterolemia  - CBC WITH DIFFERENTIAL WITH PLATELET; Future  - COMP METABOLIC PANEL (14); Future  - LIPID PANEL; Future    2.  Pr

## 2018-02-20 NOTE — TELEPHONE ENCOUNTER
Patient presented to the lab for blood work. No lab orers in the system. Medicare physical scheduled for 2/27/18.

## 2018-02-27 ENCOUNTER — OFFICE VISIT (OUTPATIENT)
Dept: INTERNAL MEDICINE CLINIC | Facility: CLINIC | Age: 78
End: 2018-02-27

## 2018-02-27 VITALS
SYSTOLIC BLOOD PRESSURE: 120 MMHG | TEMPERATURE: 98 F | DIASTOLIC BLOOD PRESSURE: 54 MMHG | HEART RATE: 76 BPM | HEIGHT: 68 IN | BODY MASS INDEX: 23.64 KG/M2 | WEIGHT: 156 LBS

## 2018-02-27 DIAGNOSIS — E78.00 HYPERCHOLESTEROLEMIA: ICD-10-CM

## 2018-02-27 DIAGNOSIS — M48.061 SPINAL STENOSIS OF LUMBAR REGION WITHOUT NEUROGENIC CLAUDICATION: ICD-10-CM

## 2018-02-27 DIAGNOSIS — Z85.46 H/O PROSTATE CANCER: ICD-10-CM

## 2018-02-27 DIAGNOSIS — Z00.00 MEDICARE ANNUAL WELLNESS VISIT, SUBSEQUENT: Primary | ICD-10-CM

## 2018-02-27 DIAGNOSIS — K21.9 GASTROESOPHAGEAL REFLUX DISEASE WITHOUT ESOPHAGITIS: ICD-10-CM

## 2018-02-27 DIAGNOSIS — R73.03 PREDIABETES: ICD-10-CM

## 2018-02-27 DIAGNOSIS — Z12.5 PROSTATE CANCER SCREENING: ICD-10-CM

## 2018-02-27 PROCEDURE — 99214 OFFICE O/P EST MOD 30 MIN: CPT | Performed by: INTERNAL MEDICINE

## 2018-02-27 PROCEDURE — G0439 PPPS, SUBSEQ VISIT: HCPCS | Performed by: INTERNAL MEDICINE

## 2018-02-27 PROCEDURE — G0463 HOSPITAL OUTPT CLINIC VISIT: HCPCS | Performed by: INTERNAL MEDICINE

## 2018-02-27 NOTE — PROGRESS NOTES
Tez Glass is a 70year old male who presents for     Medicare annual wellness    Returned from a trip to [facility]. R low back pain w sciatica follow up--is better. Was able to make the trip to [facility]. Saw Dr. Zuleta Persons.  He had pt do MRI lizabeth left eye 3/8/16    3/8/16 started on Latanoprost qhs OU by PPS- due to [address] OU (36/41)and thinning of OCT OS- treating OHT OD and COAG OS   • Prostate cancer ([address] 2000    prostatectomy at [facility]   • Skin lesion of face 2015    sees derm D bothering. Genitourinary:  No dysuria or blood in the urine  Dermatologic:  No rash or itching.     EXAM:   /54   Pulse 76   Temp 98.1 °F (36.7 °C) (Oral)   Ht 5' 8\" (1.727 m)   Wt 156 lb (70.8 kg)   BMI 23.72 kg/m²      Wt Readings from Last 6 Enc diverticulosis . Next at 5 yrs.    PSA--per pt Dr Chandni Ceja at [facility] said not need further follow up--hx of prostate CA prostatectomy in 2000.    PSA 0.0 on 8/10/17.     Takes occas valtrex for cold sores.      Lab 8/23/16- cbc cmp tsh lipid ua M2m--aovtdap o maintain a good energy level?: Daily Walks    How would you describe your daily physical activity?: Moderate    How would you describe your current health state?: Good    How do you maintain positive mental well-being?: Social Interaction    If you are a m ring or harshad:  No I have trouble hearing conversations in a noisy background such as a crowded room or restaurant:  No   I get confused about where sounds come from:  No I misunderstand some words in a sentence and need to ask people to repeat themselv

## 2018-02-28 PROBLEM — M51.369 BULGING OF LUMBAR INTERVERTEBRAL DISC: Status: ACTIVE | Noted: 2018-02-28

## 2018-02-28 PROBLEM — M51.36 BULGING OF LUMBAR INTERVERTEBRAL DISC: Status: ACTIVE | Noted: 2018-02-28

## 2018-02-28 PROBLEM — M43.16 SPONDYLOLISTHESIS AT L4-L5 LEVEL: Status: ACTIVE | Noted: 2018-02-28

## 2018-02-28 PROBLEM — M51.36 DDD (DEGENERATIVE DISC DISEASE), LUMBAR: Status: ACTIVE | Noted: 2018-02-28

## 2018-02-28 PROBLEM — M48.062 SPINAL STENOSIS OF LUMBAR REGION WITH NEUROGENIC CLAUDICATION: Status: ACTIVE | Noted: 2018-02-27

## 2018-02-28 PROBLEM — M51.369 DDD (DEGENERATIVE DISC DISEASE), LUMBAR: Status: ACTIVE | Noted: 2018-02-28

## 2018-02-28 PROBLEM — M51.26 BULGING OF LUMBAR INTERVERTEBRAL DISC: Status: ACTIVE | Noted: 2018-02-28

## 2018-03-21 ENCOUNTER — OFFICE VISIT (OUTPATIENT)
Dept: PAIN CLINIC | Facility: HOSPITAL | Age: 78
End: 2018-03-21
Attending: ORTHOPAEDIC SURGERY
Payer: MEDICARE

## 2018-03-21 VITALS — HEART RATE: 75 BPM | DIASTOLIC BLOOD PRESSURE: 73 MMHG | SYSTOLIC BLOOD PRESSURE: 139 MMHG

## 2018-03-21 DIAGNOSIS — M48.062 SPINAL STENOSIS OF LUMBAR REGION WITH NEUROGENIC CLAUDICATION: ICD-10-CM

## 2018-03-21 DIAGNOSIS — M51.26 BULGING OF LUMBAR INTERVERTEBRAL DISC: ICD-10-CM

## 2018-03-21 DIAGNOSIS — M43.16 SPONDYLOLISTHESIS AT L4-L5 LEVEL: ICD-10-CM

## 2018-03-21 DIAGNOSIS — M51.36 DDD (DEGENERATIVE DISC DISEASE), LUMBAR: ICD-10-CM

## 2018-03-21 PROCEDURE — 99201 HC OUTPT EVAL AND MGNT NEW PT LEVEL 1: CPT | Performed by: NURSE PRACTITIONER

## 2018-03-21 NOTE — PROGRESS NOTES
Pt is looking for a new pain clinic. He used to see Dr Chuy Abbasi but is unable to continue to see him. He recently went to see Dr Richard Parr for an injection on January 14 and 28. He is currently doing PT and gets injections once or twice a year.  Current pain is 1/

## 2018-03-21 NOTE — CHRONIC PAIN
[facility]  Pain Consultation     HISTORY OF PRESENT ILLNESS:  Tyrone Wolfe is a 70year old old male referred to the pain clinic by Dr. Esther Mart for Spinal stenosis of lumbar region with neurogenic claudication  DDD (degenerative disc 200 MG Oral Tab Take  by mouth as needed. Disp:  Rfl:    Multiple Vitamins-Minerals (CENTRUM SILVER OR) Take  by mouth daily. Disp:  Rfl:    Lansoprazole (PREVACID) 15 MG Oral Capsule Delayed Release Take 15 mg by mouth daily.  Disp:  Rfl:    Aspirin (Rollen [place]ard Diagnosis Date   • Abnormal MRI, knee 05/2017    MRI R knee 5/2017 per Dr. Cale Adrian meniscal tear and MCL sprain. physical therapy.    • Allergic rhinitis    • Cancer [facility]) 2000    prostate   • Colon polyps ~2007    colonoscopy Dr. Crenshaw August 2007 and [OTHER] Brother    • 1 brother[other] [OTHER] Other    • Breast Cancer Daughter 39     DCIS-had double mastectomy   • Glaucoma Neg    • Macular degeneration Neg        SOCIAL HISTORY:    Social History  Social History   Marital status:   Spouse name RIGHT   Dorsalis Pedis 2/4 2/4   Posterior Tibial 2/4 2/4          SIJ tenderness negative    SLR: negative  Geri's test: negative    Right Knee Deep tendon reflexes: Normal   Right Ankle Deep tendon reflexes: Normal     Left Knee Deep tendon reflexes: No intensity. PARASPINAL AREA:     No visible mass. OTHER:             Layering calculi or debris suggested within the gallbladder lumen. Scattered colonic diverticula are present in the sigmoid colon.  There is no colonic wall thickening or pericolonic fa impingement. Dictated by (CST): Selena Rosenthal MD on 1/10/2018 at 13:21       Approved by (CST): Selena Rosenthal MD on 1/10/2018 at 13:33          =====  CONCLUSION:   1.  Mild scoliosis and multilevel degenerative changes of the lumbar spine with m will return to clinic as needed

## 2018-04-25 ENCOUNTER — OFFICE VISIT (OUTPATIENT)
Dept: NEUROLOGY | Facility: CLINIC | Age: 78
End: 2018-04-25

## 2018-04-25 VITALS
SYSTOLIC BLOOD PRESSURE: 124 MMHG | WEIGHT: 156 LBS | BODY MASS INDEX: 23.64 KG/M2 | DIASTOLIC BLOOD PRESSURE: 64 MMHG | HEART RATE: 64 BPM | RESPIRATION RATE: 16 BRPM | HEIGHT: 68 IN

## 2018-04-25 DIAGNOSIS — M51.26 LUMBAR HERNIATED DISC: ICD-10-CM

## 2018-04-25 DIAGNOSIS — M43.16 SPONDYLOLISTHESIS AT L4-L5 LEVEL: Primary | ICD-10-CM

## 2018-04-25 DIAGNOSIS — M48.061 LUMBAR FORAMINAL STENOSIS: ICD-10-CM

## 2018-04-25 DIAGNOSIS — M51.9 LUMBAR DISC DISEASE: ICD-10-CM

## 2018-04-25 DIAGNOSIS — M54.16 LUMBAR RADICULOPATHY: ICD-10-CM

## 2018-04-25 DIAGNOSIS — M48.061 SPINAL STENOSIS OF LUMBAR REGION WITHOUT NEUROGENIC CLAUDICATION: ICD-10-CM

## 2018-04-25 PROBLEM — M51.36 BULGING OF LUMBAR INTERVERTEBRAL DISC: Status: RESOLVED | Noted: 2018-02-28 | Resolved: 2018-04-25

## 2018-04-25 PROBLEM — M48.062 SPINAL STENOSIS OF LUMBAR REGION WITH NEUROGENIC CLAUDICATION: Status: RESOLVED | Noted: 2018-02-27 | Resolved: 2018-04-25

## 2018-04-25 PROBLEM — M51.369 BULGING OF LUMBAR INTERVERTEBRAL DISC: Status: RESOLVED | Noted: 2018-02-28 | Resolved: 2018-04-25

## 2018-04-25 PROCEDURE — 99214 OFFICE O/P EST MOD 30 MIN: CPT | Performed by: PHYSICAL MEDICINE & REHABILITATION

## 2018-04-25 NOTE — PROGRESS NOTES
Low Back Pain [facility]    Chief Complaint: Patient presents with:  Leg Pain: Patient presents for a c/o R leg pain. Pt states he had an injection done in January 2018 that was 90% effective.  He reports the R thigh pain has resolved but states he has trouble i Diverticulosis     seen on colonoscopy   • GERD (gastroesophageal reflux disease)     EGD 10/30/17 Dr Cintia Helm at [facility]--poss short segment barretts--bx however neg.     • H/O colonoscopy 6/11/12    cscopy Dr Cintia Helm -no recurrent polyps, sigmoid div Dementia Mother       age 80 pneumonia   • Other[other] [OTHER] Mother    • Lipids Brother    • Diabetes Brother    • overweight[other] [OTHER] Brother    • back surgery[other] [OTHER] Brother    • 1 brother[other] [OTHER] Other    • Breast Cancer Daug Non-tender bilateral Piriformis muscles   Greater Trochanteric Bursa: Non-tender for bilateral Greater Trochanteric Bursa     Vascular lower extremity:   Dorsalis pedis pulse-RIGHT 2+   Dorsalis pedis pulse-LEFT 2+   Tibialis posterior pulse-RIGHT 2+   Tib

## 2018-04-25 NOTE — PATIENT INSTRUCTIONS
As of October 6th 2014, the Drug Enforcement Agency [facility]) is reclassifying all hydrocodone combination medications from Schedule III to Schedule II. This includes medications such as Norco, Vicodin, Lortab, Zohydro, and Vicoprofen.     What this means for y chart.      Plan  He will get back into the PT for the lumbar spine but this time at [facility]. The patient does not need any injections at this time. The patient does not need any pain medications at this time.     He will follow up in 2-3 months or sooner

## 2018-04-27 ENCOUNTER — TELEPHONE (OUTPATIENT)
Dept: NEUROLOGY | Facility: CLINIC | Age: 78
End: 2018-04-27

## 2018-04-27 NOTE — TELEPHONE ENCOUNTER
PT order was placed on 4/25/18. Patient notified and will stop over at [facility] PT to schedule.

## 2018-05-11 ENCOUNTER — OFFICE VISIT (OUTPATIENT)
Dept: PHYSICAL THERAPY | Facility: HOSPITAL | Age: 78
End: 2018-05-11
Attending: PHYSICAL MEDICINE & REHABILITATION
Payer: MEDICARE

## 2018-05-11 DIAGNOSIS — M48.061 SPINAL STENOSIS OF LUMBAR REGION WITHOUT NEUROGENIC CLAUDICATION: ICD-10-CM

## 2018-05-11 DIAGNOSIS — M51.9 LUMBAR DISC DISEASE: ICD-10-CM

## 2018-05-11 DIAGNOSIS — M51.26 LUMBAR HERNIATED DISC: ICD-10-CM

## 2018-05-11 DIAGNOSIS — M54.16 LUMBAR RADICULOPATHY: ICD-10-CM

## 2018-05-11 DIAGNOSIS — M43.16 SPONDYLOLISTHESIS AT L4-L5 LEVEL: ICD-10-CM

## 2018-05-11 DIAGNOSIS — M48.061 LUMBAR FORAMINAL STENOSIS: ICD-10-CM

## 2018-05-11 PROCEDURE — 97530 THERAPEUTIC ACTIVITIES: CPT

## 2018-05-11 PROCEDURE — 97110 THERAPEUTIC EXERCISES: CPT

## 2018-05-11 PROCEDURE — 97161 PT EVAL LOW COMPLEX 20 MIN: CPT

## 2018-05-11 NOTE — PROGRESS NOTES
LUMBAR SPINE EVALUATION:   Tuyet Eric    1/29/1940  Referring Physician:  Yolis Swain  Diagnosis: Spondylolisthesis at L4-L5 level (M43.16)  Spinal stenosis of lumbar region without neurogenic claudication (M48.061)  Lumbar herniated disc (M51.2 repetitive bending over to stack wood during the holidays. Had previous LBP with R foot drop years ago and was in traction and wore a brace for a period of time.    Current functional limitations include sitting to drive over 20 min, bending over, sitting Exercise HEP 5/11/2018    Supine Hip flexors/quads H X     Nerve glide leg pumps H X    Prone Quad stretch w strap H X    Standing  R hip to wall shift H X    Thera Act 1. Pt ed  1. Stenosis, facet protection from hypertrophy    H = HEP.  Pt given copies of Rotation min   L Rotation mod   * pain limiting    ROM Hip 5/11/2018   Flex R 130   Flex L 132   IR R 23   IR L 24   ER R 43   ER L 38 (+) R thigh pain   Ext R wnl   Ext L wnl   Abd R wnl   Abd L wnl   *pain limiting     MMT 5/5 5/11/2018   L2- hip flex R treatment: 25 min      Total Treatment Time: 75 min    Thank you for your referral. Please co-sign or sign and return this letter via fax as soon as possible to [phone].  If you have any questions, please contact me at Dept: [phone]    Sincerely,

## 2018-05-14 ENCOUNTER — OFFICE VISIT (OUTPATIENT)
Dept: PHYSICAL THERAPY | Facility: HOSPITAL | Age: 78
End: 2018-05-14
Attending: PHYSICAL MEDICINE & REHABILITATION
Payer: MEDICARE

## 2018-05-14 PROCEDURE — 97110 THERAPEUTIC EXERCISES: CPT

## 2018-05-14 PROCEDURE — 97140 MANUAL THERAPY 1/> REGIONS: CPT

## 2018-05-14 NOTE — PROGRESS NOTES
[facility]    1/29/1940  Referring Physician:  Tiffanie Marquez  Diagnosis: Spondylolisthesis at L4-L5 level (M43.16)  Spinal stenosis of lumbar region without neurogenic claudication (M48.061)  Lumbar herniated disc (M51.26)  Lumbar foraminal stenosi indicated. Pt advised to discontinue exercises that increase pain and to call or return to therapist to discuss. ASSESSMENT:     Pt had good decrease of pain after performing exercises last visit.   He demonstrates joint segmental limitations as note restricted    Posture:  FHP, mild thoracic kyphosis, L trunk shift, flattened lumbar spine. Palpation: poor PA mobility throughout lumbar spine, hyperdensity of B lumbar ps R>L  Sensation: Intact to light touch of the LE dermatomes while in sitting.    Abd

## 2018-05-21 ENCOUNTER — OFFICE VISIT (OUTPATIENT)
Dept: PHYSICAL THERAPY | Facility: HOSPITAL | Age: 78
End: 2018-05-21
Attending: PHYSICAL MEDICINE & REHABILITATION
Payer: MEDICARE

## 2018-05-21 PROCEDURE — 97530 THERAPEUTIC ACTIVITIES: CPT

## 2018-05-21 PROCEDURE — 97110 THERAPEUTIC EXERCISES: CPT

## 2018-05-21 NOTE — PROGRESS NOTES
Alexa Ibarra    1/29/1940  Referring Physician:  Kera Velez  Diagnosis: Spondylolisthesis at L4-L5 level (M43.16)  Spinal stenosis of lumbar region without neurogenic claudication (M48.061)  Lumbar herniated disc (M51.26)  Lumbar foraminal stenosi H  X    Thera Act 1. Pt ed  2. Leukotape  1. Stenosis, facet protection from hypertrophy  1. Neutral spinal control for flexion intolerance and stenosis balance. 2. Lumbar spine   Man Tx 1. MET   1. L5 ERSR, L4 FRSR    H = HEP.  Pt given copies of this ex PT per original plan for therapeutic exercises, posture retraining, therapeutic activities, manual treatment, neuromuscular reeducation, therapeutic pain neuroscience education, patient education, modalities as needed.     Charges: TE2, TA    Total Timed tr limiting     MMT 5/5 5/11/2018   L2- hip flex R 5   Hip flex L 5   L3- knee ext R 5   Knee ext L 5   L4  ankle DF R 4+   Ankle DF L 4   L5 – EHL R 5-   EHL L 5   S1 –ankle PF R 5   Ankle PF L 5   Hams R 5-   Hams L 5-   Hip abd R 5   Hip abd L 5   *pain li

## 2018-05-23 ENCOUNTER — APPOINTMENT (OUTPATIENT)
Dept: PHYSICAL THERAPY | Facility: HOSPITAL | Age: 78
End: 2018-05-23
Attending: PHYSICAL MEDICINE & REHABILITATION
Payer: MEDICARE

## 2018-05-31 ENCOUNTER — OFFICE VISIT (OUTPATIENT)
Dept: PHYSICAL THERAPY | Facility: HOSPITAL | Age: 78
End: 2018-05-31
Attending: PHYSICAL MEDICINE & REHABILITATION
Payer: MEDICARE

## 2018-05-31 PROCEDURE — 97110 THERAPEUTIC EXERCISES: CPT

## 2018-05-31 PROCEDURE — 97530 THERAPEUTIC ACTIVITIES: CPT

## 2018-05-31 NOTE — PROGRESS NOTES
Elana Pagan    1/29/1940  Referring Physician:  Shawnee Hernandez  Diagnosis: Spondylolisthesis at L4-L5 level (M43.16)  Spinal stenosis of lumbar region without neurogenic claudication (M48.061)  Lumbar herniated disc (M51.26)  Lumbar foraminal stenosi Quad stretch w strap H X   X   Standing  R hip to wall shift H X X      Quad stretch foot on chair H  X      Modified fwd lean on table hams H    X    Forward fold H    X   Thera Act 1. Pt ed  2. Leukotape  1.  Stenosis, facet protection from hypertrophy  1 increments without increased symptoms. 5. Pt will be able to lift 25# x 10 without increased symptoms. 6. Pt will demonstrate improved hamstring mobility to -25 or less to decrease discomfort with walk on the golf course.       PLAN OF CARE:        Contin Max no mvmt   R SB Mod-max   L SB Mod-max   R Rotation min   L Rotation mod   * pain limiting    ROM Hip 5/11/2018   Flex R 130   Flex L 132   IR R 23   IR L 24   ER R 43   ER L 38 (+) R thigh pain   Ext R wnl   Ext L wnl   Abd R wnl   Abd L wnl   *pain li

## 2018-06-04 ENCOUNTER — OFFICE VISIT (OUTPATIENT)
Dept: PHYSICAL THERAPY | Facility: HOSPITAL | Age: 78
End: 2018-06-04
Attending: PHYSICAL MEDICINE & REHABILITATION
Payer: MEDICARE

## 2018-06-04 PROCEDURE — 97140 MANUAL THERAPY 1/> REGIONS: CPT

## 2018-06-04 PROCEDURE — 97110 THERAPEUTIC EXERCISES: CPT

## 2018-06-04 PROCEDURE — 97530 THERAPEUTIC ACTIVITIES: CPT

## 2018-06-04 NOTE — PROGRESS NOTES
Neoma Marrow    1/29/1940  Referring Physician:  Jarrod Downing  Diagnosis: Spondylolisthesis at L4-L5 level (M43.16)  Spinal stenosis of lumbar region without neurogenic claudication (M48.061)  Lumbar herniated disc (M51.26)  Lumbar foraminal stenosi stretch foot on chair H X       Modified fwd lean on table hams H   X X    Forward fold H   X     Modified wall trunk extn h    x   Thera Act 1. Pt ed  2. Leukotape   1. Neutral spinal control for flexion intolerance and stenosis balance.    2. Lumbar spine intervention to achieve goals. Physical Therapy Goals:    1. Pt will be independent in beginning level of HEP for stretching, posture and strengthening. 2. Pt will demonstrate good body mechanics awareness for prevention of reinjury.   3. Pt will be 10 sec  ELISSA: R wnl, L mod limit of ROM  SLR: B limited ROM due hamstrings, R (+) at 50 deg for gluteal pain    Functional Performance:    5/11/2018     Motor Control   Double leg squat L wnl   Double leg squat R wnl   Single leg balance L Min genu varus

## 2018-06-05 ENCOUNTER — OFFICE VISIT (OUTPATIENT)
Dept: OPHTHALMOLOGY | Facility: CLINIC | Age: 78
End: 2018-06-05

## 2018-06-05 DIAGNOSIS — H40.051 OCULAR HYPERTENSION OF RIGHT EYE: ICD-10-CM

## 2018-06-05 DIAGNOSIS — H25.13 AGE-RELATED NUCLEAR CATARACT OF BOTH EYES: ICD-10-CM

## 2018-06-05 DIAGNOSIS — H40.1121 PRIMARY OPEN ANGLE GLAUCOMA OF LEFT EYE, MILD STAGE: Primary | ICD-10-CM

## 2018-06-05 PROCEDURE — 92014 COMPRE OPH EXAM EST PT 1/>: CPT | Performed by: OPHTHALMOLOGY

## 2018-06-05 PROCEDURE — 92250 FUNDUS PHOTOGRAPHY W/I&R: CPT | Performed by: OPHTHALMOLOGY

## 2018-06-05 PROCEDURE — 92015 DETERMINE REFRACTIVE STATE: CPT | Performed by: OPHTHALMOLOGY

## 2018-06-05 NOTE — ASSESSMENT & PLAN NOTE
Intraocular pressure stable, tolerating medications. Will continue same regimen of Latanoprost in both eyes at bedtime.     (he takes the drops in the left eye for glaucoma and in the right eye for ocular hypertension)    Photos were taken today to sheila

## 2018-06-05 NOTE — PROGRESS NOTES
Grace Hayes is a 70year old male. HPI:     HPI     Patient is here for a complete exam and photos. He is taking Latanoprost OU QHS as directed. Patient would like an updated Rx today.       Last edited by Anisa Francisco OT on 6/5/2018  1:11 PM. surgical history that includes tonsillectomy (1947) (age 13); other surgical history (1980) (cystoscopy to remove kidney stones - comm hosp ); other (egd - 11-99,5-02,6-07-Dr Nidia Dickey); colonoscopy (2007 2013) (Dr Nidia Dickey at [facility]); and remv prostate,re (PREVACID) 15 MG Oral Capsule Delayed Release Take 15 mg by mouth daily. Disp:  Rfl:    Aspirin (ECOTRIN LOW STRENGTH) 81 MG Oral Tab EC Take 81 mg by mouth daily.  Disp:  Rfl:        Allergies:  No Known Allergies    ROS:       PHYSICAL EXAM:     Base Eye 20/25 +2.50 20/20    Left +0.75 +0.75 020 20/30 +2.50 20/20    Type:  Progressive bifocal                 ASSESSMENT/PLAN:     Diagnoses and Plan:     Primary open angle glaucoma of left eye, mild stage   Intraocular pressure stable, tolerating medications

## 2018-06-11 ENCOUNTER — OFFICE VISIT (OUTPATIENT)
Dept: PHYSICAL THERAPY | Facility: HOSPITAL | Age: 78
End: 2018-06-11
Attending: PHYSICAL MEDICINE & REHABILITATION
Payer: MEDICARE

## 2018-06-11 PROCEDURE — 97530 THERAPEUTIC ACTIVITIES: CPT

## 2018-06-11 PROCEDURE — 97110 THERAPEUTIC EXERCISES: CPT

## 2018-06-11 NOTE — PROGRESS NOTES
Stephenie Cardenas    1/29/1940  Referring Physician:  Rodolfo Ardon  Diagnosis: Spondylolisthesis at L4-L5 level (M43.16)  Spinal stenosis of lumbar region without neurogenic claudication (M48.061)  Lumbar herniated disc (M51.26)  Lumbar foraminal stenosi UE/LE H X  X     Crossed leg piriformis H X  X     Doorframe hams stretch H  X X     90-90 Scott positioning H   X     TA activation w exhale     x   Sitting Hip ER H X X X     Piriformis stretch cross leg  X X X     Fwd bend   X X    Prone Quad stretch in the car. Pt educated on effects of diaphragm position on spinal alignment w diagram.  Pt verbalized understanding. Pt demonstrated decrease B upper>lower rib mobility.   Pt instructed in awareness of rib mobility and exhale control to gain full abdomin R>L  Sensation: Intact to light touch of the LE dermatomes while in sitting. Abdominals: Good tone, no diastasis  Lumbopelvic control: fair due to lack of mobility  Body Mechanics: fair demonstrated while reaching for personal belongings.   Gait: fwd trun

## 2018-06-13 ENCOUNTER — NURSE ONLY (OUTPATIENT)
Dept: OPHTHALMOLOGY | Facility: CLINIC | Age: 78
End: 2018-06-13

## 2018-06-13 DIAGNOSIS — H40.1121 PRIMARY OPEN ANGLE GLAUCOMA OF LEFT EYE, MILD STAGE: ICD-10-CM

## 2018-06-13 PROCEDURE — 92133 CPTRZD OPH DX IMG PST SGM ON: CPT | Performed by: OPHTHALMOLOGY

## 2018-06-13 PROCEDURE — 92083 EXTENDED VISUAL FIELD XM: CPT | Performed by: OPHTHALMOLOGY

## 2018-06-14 ENCOUNTER — TELEPHONE (OUTPATIENT)
Dept: OPHTHALMOLOGY | Facility: CLINIC | Age: 78
End: 2018-06-14

## 2018-06-14 NOTE — PROGRESS NOTES
Marika Garduno is a 70year old male.     HPI:     HPI     Patient is here for a VF and OCT with no MD.     Last edited by Reta Clemons on 6/13/2018  3:41 PM. (History)        Patient History:  Past Medical History:   Diagnosis Date   • Abnormal MRI, knee (cystoscopy to remove kidney stones -[facility] ); other (egd - 11-99,5-02,6-07-Dr Deondre Herron); colonoscopy (2007 2013) (Dr Deondre Herron at [facility]); and remv prostate,retropub,radical (2000) (prostatectomy for prostate cancer Dr. Alexander De La Fuente at [facility]).     Family (ECOTRIN LOW STRENGTH) 81 MG Oral Tab EC Take 81 mg by mouth daily.  Disp:  Rfl:        Allergies:  No Known Allergies    ROS:       PHYSICAL EXAM:      Not recorded           ASSESSMENT/PLAN:     Diagnoses and Plan:     Primary open angle glaucoma of left

## 2018-06-18 ENCOUNTER — OFFICE VISIT (OUTPATIENT)
Dept: PHYSICAL THERAPY | Facility: HOSPITAL | Age: 78
End: 2018-06-18
Attending: PHYSICAL MEDICINE & REHABILITATION
Payer: MEDICARE

## 2018-06-18 PROCEDURE — 97110 THERAPEUTIC EXERCISES: CPT

## 2018-06-18 PROCEDURE — 97530 THERAPEUTIC ACTIVITIES: CPT

## 2018-06-18 RX ORDER — ROSUVASTATIN CALCIUM 20 MG/1
TABLET, COATED ORAL
Qty: 90 TABLET | Refills: 3 | OUTPATIENT
Start: 2018-06-18

## 2018-06-18 NOTE — PROGRESS NOTES
Janice Varghese    1/29/1940  Referring Physician:  Cheri Mcgill  Diagnosis: Spondylolisthesis at L4-L5 level (M43.16)  Spinal stenosis of lumbar region without neurogenic claudication (M48.061)  Lumbar herniated disc (M51.26)  Lumbar foraminal stenosi Doorframe hams stretch H X X  X    90-90 Scott positioning H  X      TA activation w exhale    x    Sitting Hip ER H X X      Piriformis stretch cross leg  X X      Fwd bend  X X     Prone Quad stretch w strap H X  X    Sidelying Side plank     X   Michelle Wyatt hams activation. He has min-mod gastroc tightness and chose wall calf stretch to add to his HEP. Patient instructed in body mechanics for squat lift and golfer's lift.  Pt started by lifting a pillow from 24\" height then from the floor and practiced pivo shift, flattened lumbar spine. Palpation: poor PA mobility throughout lumbar spine, hyperdensity of B lumbar ps R>L  Sensation: Intact to light touch of the LE dermatomes while in sitting.    Abdominals: Good tone, no diastasis  Lumbopelvic control: fair d

## 2018-06-21 ENCOUNTER — TELEPHONE (OUTPATIENT)
Dept: OPHTHALMOLOGY | Facility: CLINIC | Age: 78
End: 2018-06-21

## 2018-06-21 ENCOUNTER — TELEPHONE (OUTPATIENT)
Dept: PHYSICAL THERAPY | Facility: HOSPITAL | Age: 78
End: 2018-06-21

## 2018-06-21 NOTE — TELEPHONE ENCOUNTER
Pt would like to speak with clinical staff to discuss in further detail the results he received in the mail. Please advise. Thank you.

## 2018-06-25 ENCOUNTER — APPOINTMENT (OUTPATIENT)
Dept: PHYSICAL THERAPY | Facility: HOSPITAL | Age: 78
End: 2018-06-25
Attending: PHYSICAL MEDICINE & REHABILITATION
Payer: MEDICARE

## 2018-07-03 ENCOUNTER — TELEPHONE (OUTPATIENT)
Dept: PHYSICAL THERAPY | Facility: HOSPITAL | Age: 78
End: 2018-07-03

## 2018-07-09 ENCOUNTER — APPOINTMENT (OUTPATIENT)
Dept: PHYSICAL THERAPY | Facility: HOSPITAL | Age: 78
End: 2018-07-09
Attending: PHYSICAL MEDICINE & REHABILITATION
Payer: MEDICARE

## 2018-07-09 ENCOUNTER — TELEPHONE (OUTPATIENT)
Dept: PHYSICAL THERAPY | Facility: HOSPITAL | Age: 78
End: 2018-07-09

## 2018-08-06 ENCOUNTER — LAB ENCOUNTER (OUTPATIENT)
Dept: LAB | Age: 78
End: 2018-08-06
Attending: INTERNAL MEDICINE
Payer: MEDICARE

## 2018-08-06 DIAGNOSIS — Z12.5 PROSTATE CANCER SCREENING: ICD-10-CM

## 2018-08-06 DIAGNOSIS — E78.00 HYPERCHOLESTEROLEMIA: ICD-10-CM

## 2018-08-06 DIAGNOSIS — R73.03 PREDIABETES: ICD-10-CM

## 2018-08-06 LAB
ALBUMIN SERPL BCP-MCNC: 4 G/DL (ref 3.5–4.8)
ALBUMIN/GLOB SERPL: 1.8 {RATIO} (ref 1–2)
ALP SERPL-CCNC: 47 U/L (ref 32–100)
ALT SERPL-CCNC: 25 U/L (ref 17–63)
ANION GAP SERPL CALC-SCNC: 6 MMOL/L (ref 0–18)
AST SERPL-CCNC: 27 U/L (ref 15–41)
BASOPHILS # BLD: 0 K/UL (ref 0–0.2)
BASOPHILS NFR BLD: 1 %
BILIRUB SERPL-MCNC: 0.6 MG/DL (ref 0.3–1.2)
BILIRUB UR QL: NEGATIVE
BUN SERPL-MCNC: 13 MG/DL (ref 8–20)
BUN/CREAT SERPL: 14.1 (ref 10–20)
CALCIUM SERPL-MCNC: 8.9 MG/DL (ref 8.5–10.5)
CHLORIDE SERPL-SCNC: 101 MMOL/L (ref 95–110)
CHOLEST SERPL-MCNC: 164 MG/DL (ref 110–200)
CLARITY UR: CLEAR
CO2 SERPL-SCNC: 27 MMOL/L (ref 22–32)
COLOR UR: YELLOW
CREAT SERPL-MCNC: 0.92 MG/DL (ref 0.5–1.5)
EOSINOPHIL # BLD: 0.2 K/UL (ref 0–0.7)
EOSINOPHIL NFR BLD: 3 %
ERYTHROCYTE [DISTWIDTH] IN BLOOD BY AUTOMATED COUNT: 13.3 % (ref 11–15)
GLOBULIN PLAS-MCNC: 2.2 G/DL (ref 2.5–3.7)
GLUCOSE SERPL-MCNC: 97 MG/DL (ref 70–99)
GLUCOSE UR-MCNC: NEGATIVE MG/DL
HBA1C MFR BLD: 5.8 % (ref 4–6)
HCT VFR BLD AUTO: 40.5 % (ref 41–52)
HDLC SERPL-MCNC: 65 MG/DL
HGB BLD-MCNC: 13.7 G/DL (ref 13.5–17.5)
HGB UR QL STRIP.AUTO: NEGATIVE
KETONES UR-MCNC: NEGATIVE MG/DL
LDLC SERPL CALC-MCNC: 88 MG/DL (ref 0–99)
LEUKOCYTE ESTERASE UR QL STRIP.AUTO: NEGATIVE
LYMPHOCYTES # BLD: 2.2 K/UL (ref 1–4)
LYMPHOCYTES NFR BLD: 39 %
MCH RBC QN AUTO: 30.2 PG (ref 27–32)
MCHC RBC AUTO-ENTMCNC: 33.9 G/DL (ref 32–37)
MCV RBC AUTO: 89 FL (ref 80–100)
MONOCYTES # BLD: 0.5 K/UL (ref 0–1)
MONOCYTES NFR BLD: 9 %
NEUTROPHILS # BLD AUTO: 2.8 K/UL (ref 1.8–7.7)
NEUTROPHILS NFR BLD: 49 %
NITRITE UR QL STRIP.AUTO: NEGATIVE
NONHDLC SERPL-MCNC: 99 MG/DL
OSMOLALITY UR CALC.SUM OF ELEC: 278 MOSM/KG (ref 275–295)
PATIENT FASTING: YES
PH UR: 7 [PH] (ref 5–8)
PLATELET # BLD AUTO: 254 K/UL (ref 140–400)
PMV BLD AUTO: 7.9 FL (ref 7.4–10.3)
POTASSIUM SERPL-SCNC: 4.5 MMOL/L (ref 3.3–5.1)
PROT SERPL-MCNC: 6.2 G/DL (ref 5.9–8.4)
PROT UR-MCNC: NEGATIVE MG/DL
PSA SERPL-MCNC: 0 NG/ML (ref 0–4)
RBC # BLD AUTO: 4.55 M/UL (ref 4.5–5.9)
SODIUM SERPL-SCNC: 134 MMOL/L (ref 136–144)
SP GR UR STRIP: 1.02 (ref 1–1.03)
TRIGL SERPL-MCNC: 57 MG/DL (ref 1–149)
TSH SERPL-ACNC: 2.59 UIU/ML (ref 0.45–5.33)
UROBILINOGEN UR STRIP-ACNC: <2
VIT C UR-MCNC: NEGATIVE MG/DL
WBC # BLD AUTO: 5.7 K/UL (ref 4–11)

## 2018-08-06 PROCEDURE — 85025 COMPLETE CBC W/AUTO DIFF WBC: CPT

## 2018-08-06 PROCEDURE — 84443 ASSAY THYROID STIM HORMONE: CPT

## 2018-08-06 PROCEDURE — 80053 COMPREHEN METABOLIC PANEL: CPT

## 2018-08-06 PROCEDURE — 81003 URINALYSIS AUTO W/O SCOPE: CPT

## 2018-08-06 PROCEDURE — 80061 LIPID PANEL: CPT

## 2018-08-06 PROCEDURE — 36415 COLL VENOUS BLD VENIPUNCTURE: CPT

## 2018-08-06 PROCEDURE — 83036 HEMOGLOBIN GLYCOSYLATED A1C: CPT

## 2018-08-06 RX ORDER — ROSUVASTATIN CALCIUM 20 MG/1
TABLET, COATED ORAL
Qty: 90 TABLET | Refills: 3 | Status: SHIPPED | OUTPATIENT
Start: 2018-08-06 | End: 2018-08-27

## 2018-08-15 ENCOUNTER — OFFICE VISIT (OUTPATIENT)
Dept: NEUROLOGY | Facility: CLINIC | Age: 78
End: 2018-08-15
Payer: MEDICARE

## 2018-08-15 VITALS
SYSTOLIC BLOOD PRESSURE: 130 MMHG | BODY MASS INDEX: 22.43 KG/M2 | WEIGHT: 148 LBS | HEIGHT: 68 IN | RESPIRATION RATE: 16 BRPM | HEART RATE: 72 BPM | DIASTOLIC BLOOD PRESSURE: 70 MMHG

## 2018-08-15 DIAGNOSIS — M54.16 LUMBAR RADICULOPATHY: ICD-10-CM

## 2018-08-15 DIAGNOSIS — M48.061 SPINAL STENOSIS OF LUMBAR REGION WITHOUT NEUROGENIC CLAUDICATION: ICD-10-CM

## 2018-08-15 DIAGNOSIS — M51.9 LUMBAR DISC DISEASE: ICD-10-CM

## 2018-08-15 DIAGNOSIS — M48.061 LUMBAR FORAMINAL STENOSIS: ICD-10-CM

## 2018-08-15 DIAGNOSIS — M43.16 SPONDYLOLISTHESIS AT L4-L5 LEVEL: Primary | ICD-10-CM

## 2018-08-15 DIAGNOSIS — M51.26 LUMBAR HERNIATED DISC: ICD-10-CM

## 2018-08-15 PROCEDURE — 99214 OFFICE O/P EST MOD 30 MIN: CPT | Performed by: PHYSICAL MEDICINE & REHABILITATION

## 2018-08-15 NOTE — PROGRESS NOTES
Low Back Pain H & P    Chief Complaint: Patient presents with:  Leg Pain: Patient presents for follow up on right leg pain, LOV: 4/25/18. States pain increases when sitting for long periods of time, also radiates to right buttox.  Has done PT, states the pa EGD 1999, 2002, 2007  Dr Conner Wise    • Hyperlipidemia    • Kidney stones 1980    removed via cystoscopy at [address]   • Lumbar degenerative disc disease     Dr Claudia Maradiaga did LESIs 2013, 2014, 2015; has seen Dr. Aaron Mcdaniel and has seen  surgery[other] [OTHER] Brother    • 1 brother[other] [OTHER] Other    • Breast Cancer Daughter 39     DCIS-had double mastectomy   • Glaucoma Neg    • Macular degeneration Neg        Social History     Social History  Social History   Marital status: Marri Spinous Processes: Non-tender for all Spinous Processes   Z-joints: Non-tender for all Z-joints   SIJ: Non-tender for bilateral SIJ   Piriformis Muscle: Non-tender bilateral Piriformis muscles   Greater Trochanteric Bursa: Non-tender for bilateral Greate

## 2018-08-15 NOTE — PATIENT INSTRUCTIONS
As of October 6th 2014, the Drug Enforcement Agency [facility]) is reclassifying all hydrocodone combination medications from Schedule III to Schedule II. This includes medications such as Norco, Vicodin, Lortab, Zohydro, and Vicoprofen.     What this means for y will use the lumbar rolls and supports for the trip that he is going on tomorrow. He will call me once he returns to let me know how the lumbar rolls and supports worked for him.     The patient will continue with his home exercise program.    The patient

## 2018-08-16 NOTE — PROGRESS NOTES
Pt cancelled his last two visits and has not called for further appointments. Message left on patient's phone regarding discharge of physical therapy chart and need for new rx from physician to return to tx.   Pt given phone number to call with any questio

## 2018-08-24 ENCOUNTER — TELEPHONE (OUTPATIENT)
Dept: INTERNAL MEDICINE CLINIC | Facility: CLINIC | Age: 78
End: 2018-08-24

## 2018-08-24 RX ORDER — VALACYCLOVIR HYDROCHLORIDE 1 G/1
TABLET, FILM COATED ORAL
Qty: 8 TABLET | Refills: 1 | Status: SHIPPED | OUTPATIENT
Start: 2018-08-24 | End: 2019-08-14

## 2018-08-24 NOTE — TELEPHONE ENCOUNTER
To nursing, please tell pt I sent refill to [place] for Valtrex. Thanks. Valtrex 1 gm--take 2 tabs at onset of cold sore and repeat dose in 12 hrs. #8 w 1 refill.

## 2018-08-24 NOTE — TELEPHONE ENCOUNTER
Pt requesting RX renewal for:  Valacyclovir HCI  Pt has a cold sore starting, has no medication left  Pt will be seen on Monday but does not want to wait  [facility]  Tasked to [employer]

## 2018-08-27 ENCOUNTER — TELEPHONE (OUTPATIENT)
Dept: NEUROLOGY | Facility: CLINIC | Age: 78
End: 2018-08-27

## 2018-08-27 ENCOUNTER — OFFICE VISIT (OUTPATIENT)
Dept: INTERNAL MEDICINE CLINIC | Facility: CLINIC | Age: 78
End: 2018-08-27
Payer: MEDICARE

## 2018-08-27 VITALS
HEIGHT: 68 IN | TEMPERATURE: 98 F | OXYGEN SATURATION: 99 % | DIASTOLIC BLOOD PRESSURE: 66 MMHG | WEIGHT: 146 LBS | BODY MASS INDEX: 22.13 KG/M2 | SYSTOLIC BLOOD PRESSURE: 122 MMHG | HEART RATE: 62 BPM

## 2018-08-27 DIAGNOSIS — M48.061 SPINAL STENOSIS OF LUMBAR REGION WITHOUT NEUROGENIC CLAUDICATION: ICD-10-CM

## 2018-08-27 DIAGNOSIS — E78.00 HYPERCHOLESTEROLEMIA: Primary | ICD-10-CM

## 2018-08-27 DIAGNOSIS — R73.03 PREDIABETES: ICD-10-CM

## 2018-08-27 PROCEDURE — G0463 HOSPITAL OUTPT CLINIC VISIT: HCPCS | Performed by: INTERNAL MEDICINE

## 2018-08-27 PROCEDURE — 99214 OFFICE O/P EST MOD 30 MIN: CPT | Performed by: INTERNAL MEDICINE

## 2018-08-27 RX ORDER — ROSUVASTATIN CALCIUM 20 MG/1
20 TABLET, COATED ORAL
Qty: 90 TABLET | Refills: 3 | Status: CANCELLED | OUTPATIENT
Start: 2018-08-27

## 2018-08-27 RX ORDER — FLUTICASONE PROPIONATE 50 MCG
2 SPRAY, SUSPENSION (ML) NASAL DAILY PRN
Qty: 3 BOTTLE | Refills: 3 | Status: SHIPPED | OUTPATIENT
Start: 2018-08-27 | End: 2020-03-19

## 2018-08-27 RX ORDER — VALACYCLOVIR HYDROCHLORIDE 1 G/1
TABLET, FILM COATED ORAL
Qty: 8 TABLET | Refills: 1 | Status: CANCELLED | OUTPATIENT
Start: 2018-08-27

## 2018-08-27 RX ORDER — ROSUVASTATIN CALCIUM 20 MG/1
TABLET, COATED ORAL
Qty: 90 TABLET | Refills: 3 | Status: SHIPPED | OUTPATIENT
Start: 2018-08-27 | End: 2019-08-28

## 2018-08-27 NOTE — PROGRESS NOTES
Elana Pagan is a 70year old male who presents for     6 mo check      R low back pain w sciatica follow up--is better. Was able to make trip to [facility] this mo. Sees Dr. Vahe Peterson. Injection in Jan helped--Dr Mary Bender on [address].  Had PT • Primary open angle glaucoma of left eye 3/8/16    3/8/16 started on Latanoprost qhs OU by PPS- due to [address] OU (36/41)and thinning of OCT OS- treating OHT OD and COAG OS   • Prostate cancer ([address] 2000    prostatectomy at [facility]   • Skin or constipation, keeps hemorrhoid cream on hand. Dermatologic:  No rash or itching. Had a cold sore that res last wk w valtrex.      EXAM:   /66 (BP Location: Right arm, Patient Position: Sitting)   Pulse 62   Temp 98.2 °F (36.8 °C) (Oral)   Ht 5' 8 shot this fall. Colonoscopy Dr. Harsh Dill 8/7/17--2 gwyn polyps, int hem and diverticulosis . Next at 5 yrs.    PSA--per pt Dr Ella Guo at [facility] said not need further follow up--hx of prostate CA prostatectomy in 2000.    PSA 0.0 on 8/10/17 and 8/6/18.

## 2018-08-27 NOTE — TELEPHONE ENCOUNTER
Patient calling with update after his recent trip to [facility]. Plane ride was no problem related to 's suggestions on how to sit with the roll. Also, report no pain sitting in the car. Patient walking 23K steps per day without any issues.

## 2018-09-26 RX ORDER — LATANOPROST 50 UG/ML
SOLUTION/ DROPS OPHTHALMIC
Qty: 3 BOTTLE | Refills: 3 | Status: SHIPPED | OUTPATIENT
Start: 2018-09-26 | End: 2018-11-16

## 2018-10-10 ENCOUNTER — NURSE ONLY (OUTPATIENT)
Dept: INTERNAL MEDICINE CLINIC | Facility: CLINIC | Age: 78
End: 2018-10-10
Payer: MEDICARE

## 2018-10-10 DIAGNOSIS — Z23 NEED FOR INFLUENZA VACCINATION: Primary | ICD-10-CM

## 2018-10-10 PROCEDURE — G0008 ADMIN INFLUENZA VIRUS VAC: HCPCS | Performed by: INTERNAL MEDICINE

## 2018-10-10 PROCEDURE — 90653 IIV ADJUVANT VACCINE IM: CPT | Performed by: INTERNAL MEDICINE

## 2018-10-16 ENCOUNTER — OFFICE VISIT (OUTPATIENT)
Dept: OPHTHALMOLOGY | Facility: CLINIC | Age: 78
End: 2018-10-16
Payer: MEDICARE

## 2018-10-16 DIAGNOSIS — H40.1131 PRIMARY OPEN ANGLE GLAUCOMA (POAG) OF BOTH EYES, MILD STAGE: Primary | ICD-10-CM

## 2018-10-16 DIAGNOSIS — H25.13 AGE-RELATED NUCLEAR CATARACT OF BOTH EYES: ICD-10-CM

## 2018-10-16 PROCEDURE — 99213 OFFICE O/P EST LOW 20 MIN: CPT | Performed by: OPHTHALMOLOGY

## 2018-10-16 PROCEDURE — G0463 HOSPITAL OUTPT CLINIC VISIT: HCPCS | Performed by: OPHTHALMOLOGY

## 2018-10-16 NOTE — PATIENT INSTRUCTIONS
Primary open angle glaucoma (POAG) of both eyes, mild stage   Intraocular pressure stable, tolerating medications. Will continue same regimen of Latanoprost in both eyes at bedtime. Will have patient back in 4 months for a pressure check.            A

## 2018-10-16 NOTE — PROGRESS NOTES
Amy Grace is a 70year old male. HPI:     HPI     Pt is here for an IOP check and is using Latanoprost OU QHS as directed. Pt states his distance vision is not as sharp as he would like with his glasses on.      Last edited by COMFORT Melchor prostatectomy at [facility]   • Skin lesion of face 2015    sees derm Dr. Leatha Barrett in [facility]   • Superficial punctate keratitis 2010    left   • Thoracic spondylosis        Surgical History: Ardrea Brookslemuel has a past surgical history that i tablet Rfl: 3   ValACYclovir HCl (VALTREX) 1 G Oral Tab Take 2 tablets at onset of cold sore and repeat dose once with 2 tablets in 12 hours. Disp: 8 tablet Rfl: 1   ibuprofen (ADVIL) 200 MG Oral Tab Take  by mouth as needed.  Disp:  Rfl:    Multiple Vitami stage   Intraocular pressure stable, tolerating medications. Will continue same regimen of Latanoprost in both eyes at bedtime. Will have patient back in 4 months for a pressure check.            Age-related nuclear cataract of both eyes  Discussed wi

## 2018-10-30 ENCOUNTER — TELEPHONE (OUTPATIENT)
Dept: INTERNAL MEDICINE CLINIC | Facility: CLINIC | Age: 78
End: 2018-10-30

## 2018-10-30 NOTE — TELEPHONE ENCOUNTER
Pt. Called stating he is going to [place] first week on January. He is going to see Meño Hurley as instructed by Dr. Ozzie Cooper. On 11-27-18.   She asked him to call here and get a list of all the immunizations with the dates they were administered to bring

## 2018-11-05 NOTE — TELEPHONE ENCOUNTER
Pt had two step shingles vaccin one in July and one lest Friday. Like this added to his immunizations.

## 2018-11-06 ENCOUNTER — TELEPHONE (OUTPATIENT)
Dept: OPHTHALMOLOGY | Facility: CLINIC | Age: 78
End: 2018-11-06

## 2018-11-06 NOTE — TELEPHONE ENCOUNTER
Pt called stating pt has appt 11-13-18 at [facility] eye clinic. Evaluation for cataract surgery. Pt is requesting the last 3 visits and any tests results be faxed [phone]. Call pt after sent.

## 2018-11-12 NOTE — TELEPHONE ENCOUNTER
To nursing, in his age group, his report of having had chicken pox, measles and mumps as a child should be sufficient information to provide to the travel clinic. Also he can provide the information that he was vaccinated for polio as a child.   The travel

## 2018-11-12 NOTE — TELEPHONE ENCOUNTER
Pt. States he had chicken pox, measles, & mumps as a child pt. Was vaccinated for polio as a child pt.  is going to the travel clinic and wants to know what he needs to do to provide proof ph. # [phone]   Routed to clinical

## 2018-11-16 RX ORDER — LATANOPROST 50 UG/ML
SOLUTION/ DROPS OPHTHALMIC
Qty: 3 BOTTLE | Refills: 3 | Status: SHIPPED | OUTPATIENT
Start: 2018-11-16

## 2018-11-27 ENCOUNTER — APPOINTMENT (OUTPATIENT)
Dept: LAB | Facility: REFERENCE LAB | Age: 78
End: 2018-11-27
Attending: FAMILY MEDICINE
Payer: MEDICARE

## 2018-11-27 ENCOUNTER — OFFICE VISIT (OUTPATIENT)
Dept: NEUROLOGY | Facility: CLINIC | Age: 78
End: 2018-11-27
Payer: MEDICARE

## 2018-11-27 ENCOUNTER — OFFICE VISIT (OUTPATIENT)
Dept: FAMILY MEDICINE CLINIC | Facility: CLINIC | Age: 78
End: 2018-11-27
Payer: MEDICARE

## 2018-11-27 VITALS
SYSTOLIC BLOOD PRESSURE: 132 MMHG | OXYGEN SATURATION: 98 % | BODY MASS INDEX: 22.73 KG/M2 | HEIGHT: 68 IN | RESPIRATION RATE: 17 BRPM | DIASTOLIC BLOOD PRESSURE: 60 MMHG | HEART RATE: 58 BPM | WEIGHT: 150 LBS

## 2018-11-27 VITALS
BODY MASS INDEX: 21.98 KG/M2 | HEART RATE: 76 BPM | HEIGHT: 68 IN | SYSTOLIC BLOOD PRESSURE: 130 MMHG | DIASTOLIC BLOOD PRESSURE: 60 MMHG | WEIGHT: 145 LBS | RESPIRATION RATE: 16 BRPM

## 2018-11-27 DIAGNOSIS — Z71.84 COUNSELING FOR TRAVEL: ICD-10-CM

## 2018-11-27 DIAGNOSIS — Z71.9 ENCOUNTER FOR CONSULTATION: Primary | ICD-10-CM

## 2018-11-27 DIAGNOSIS — M48.061 SPINAL STENOSIS OF LUMBAR REGION WITHOUT NEUROGENIC CLAUDICATION: Primary | ICD-10-CM

## 2018-11-27 DIAGNOSIS — M51.26 LUMBAR HERNIATED DISC: ICD-10-CM

## 2018-11-27 DIAGNOSIS — M48.061 LUMBAR FORAMINAL STENOSIS: ICD-10-CM

## 2018-11-27 DIAGNOSIS — M43.16 SPONDYLOLISTHESIS OF LUMBAR REGION: ICD-10-CM

## 2018-11-27 DIAGNOSIS — M51.9 LUMBAR DISC DISEASE: ICD-10-CM

## 2018-11-27 DIAGNOSIS — Z71.9 ENCOUNTER FOR CONSULTATION: ICD-10-CM

## 2018-11-27 DIAGNOSIS — M54.16 LUMBAR RADICULOPATHY: ICD-10-CM

## 2018-11-27 PROCEDURE — 90472 IMMUNIZATION ADMIN EACH ADD: CPT | Performed by: FAMILY MEDICINE

## 2018-11-27 PROCEDURE — 86735 MUMPS ANTIBODY: CPT

## 2018-11-27 PROCEDURE — 90691 TYPHOID VACCINE IM: CPT | Performed by: FAMILY MEDICINE

## 2018-11-27 PROCEDURE — 90715 TDAP VACCINE 7 YRS/> IM: CPT | Performed by: FAMILY MEDICINE

## 2018-11-27 PROCEDURE — 99214 OFFICE O/P EST MOD 30 MIN: CPT | Performed by: PHYSICAL MEDICINE & REHABILITATION

## 2018-11-27 PROCEDURE — 36415 COLL VENOUS BLD VENIPUNCTURE: CPT

## 2018-11-27 PROCEDURE — 90471 IMMUNIZATION ADMIN: CPT | Performed by: FAMILY MEDICINE

## 2018-11-27 PROCEDURE — 86658 ENTEROVIRUS ANTIBODY: CPT

## 2018-11-27 PROCEDURE — 86762 RUBELLA ANTIBODY: CPT

## 2018-11-27 PROCEDURE — 99204 OFFICE O/P NEW MOD 45 MIN: CPT | Performed by: FAMILY MEDICINE

## 2018-11-27 PROCEDURE — 86765 RUBEOLA ANTIBODY: CPT

## 2018-11-27 RX ORDER — AZITHROMYCIN 500 MG/1
TABLET, FILM COATED ORAL
Qty: 5 TABLET | Refills: 0 | Status: SHIPPED
Start: 2018-11-27 | End: 2019-02-25

## 2018-11-27 RX ORDER — ACETAZOLAMIDE 125 MG/1
TABLET ORAL
Qty: 20 TABLET | Refills: 0 | Status: SHIPPED
Start: 2018-11-27 | End: 2018-11-27

## 2018-11-27 RX ORDER — METHYLPREDNISOLONE 4 MG/1
TABLET ORAL
Qty: 1 PACKAGE | Refills: 0 | Status: SHIPPED | OUTPATIENT
Start: 2018-11-27 | End: 2019-02-25

## 2018-11-27 RX ORDER — AZITHROMYCIN 500 MG/1
TABLET, FILM COATED ORAL
Qty: 5 TABLET | Refills: 0 | Status: SHIPPED
Start: 2018-11-27 | End: 2018-11-27

## 2018-11-27 RX ORDER — ACETAZOLAMIDE 125 MG/1
TABLET ORAL
Qty: 20 TABLET | Refills: 0 | Status: SHIPPED
Start: 2018-11-27 | End: 2019-04-17

## 2018-11-27 NOTE — PROGRESS NOTES
Low Back Pain H & P    Chief Complaint: Patient presents with:  Low Back Pain: Patient here for f/u. LOV 8/15/2018. Patient states right thigh pain is improved only occurs during certain positions.  Patient also states right buttock pain occurs only when si Hyperlipidemia    • Kidney stones 1980    removed via cystoscopy at [address]   • Lumbar degenerative disc disease     Dr Lelo Ayala did LESIs 2013, 2014, 2015; has seen Dr. Rommel Alexis and has seen Dr. Narcisa Marsh neurosurg at [facility]. Breast Cancer Daughter 39        DCIS-had double mastectomy   • Glaucoma Neg    • Macular degeneration Neg        Social History   Social History    Socioeconomic History      Marital status:       Spouse name: Not on file      Number of children: N respiratory distress. Patient does not have a cough. HEENT:  Extraocular muscles are intact. There is no kern icterus. Pupils are equal, round, and reactive to light. No redness or discharge bilaterally. Skin:  There are no rashes or lesions.     Elisha in 6 months or sooner if needed. He will let me know if has to take the Medrol Dosepak. The patient understands and agrees with the stated plan. Rosa Maradiaga MD  11/27/2018

## 2018-11-27 NOTE — PROGRESS NOTES
HPI:   Patient presents with:  Consult: Travel to [facility] 1/7/19-1/19/19 for vacation, layover in [place] is a 70year old male. who presents primarily presents for Travel Clinic:  Counseling, Advice and Immunizations    · Patient will b days; usual duration is 1-3 days (stop Crestor while on Azithro) Disp: 5 tablet Rfl: 0   acetaZOLAMIDE 125 MG Oral Tab FOR ALTITUDE SICKNESS PREVENTION: Take one to two tablets by mouth twice a day.  Start 1-2 days prior to reaching [address] Dr Jered Yuen -no recurrent polyps, sigmoid diverticulosis and int hemorrhoids.     • Hearing loss     hearing aides   • Herpes labialis    • Hiatal hernia 1999    EGD 1999, 2002, 2007  Dr Jered Yuen    • Hyperlipidemia    • Kidney stones 1980    removed v use: Yes      Alcohol/week: 8.4 oz      Types: 14 Glasses of wine per week      Comment: 2 glasses wine per day. Drug use: No        REVIEW OF SYSTEMS:   Pertinent positives and negatives noted in the the HPI.  Specifically:  GEN:  No fever or excessive Departs 1/7/19-1/19/19  · Purpose of travel/visit is: vacation with wife  · Immunizations given today: Tdap and Typhoid IM  · Immunizations due: will check titers for MMR and polio, f/u for boosters if non-immune  · Other recommendations today:  · Lei 3) ANTIBODIES; Future  - azithromycin 500 MG Oral Tab;  Take one tablet by mouth once a day as needed until symptoms of TRAVELERS' DIARRHEA resolve or are significantly improved; take for up to 5 days; usual duration is 1-3 days (stop Crestor while on J. C. Asiya Randal Pandya MD

## 2018-11-27 NOTE — PATIENT INSTRUCTIONS
Plan  He will take a Medrol Dosepak with him on his trip. The patient will continue with his home exercise program.    The patient does not need any injections at this time. He will follow up in 6 months or sooner if needed.     He will let me know

## 2019-01-02 ENCOUNTER — TELEPHONE (OUTPATIENT)
Dept: NEUROLOGY | Facility: CLINIC | Age: 79
End: 2019-01-02

## 2019-01-02 DIAGNOSIS — Z71.84 COUNSELING FOR TRAVEL: ICD-10-CM

## 2019-01-02 DIAGNOSIS — Z71.9 ENCOUNTER FOR CONSULTATION: ICD-10-CM

## 2019-01-02 NOTE — TELEPHONE ENCOUNTER
Patient states he is having some discomfort in the right thigh that is mild  Pain is not severe but feels it may get worse    Leaves for hiking trip Monday and is asking for muscle relaxant prior to trip.  Has taken some of his wifes Baclofen 10mg bid prn f

## 2019-01-03 RX ORDER — BACLOFEN 10 MG/1
10 TABLET ORAL 3 TIMES DAILY
Qty: 90 TABLET | Refills: 0 | Status: SHIPPED | OUTPATIENT
Start: 2019-01-03 | End: 2019-02-25

## 2019-02-07 ENCOUNTER — TELEPHONE (OUTPATIENT)
Dept: INTERNAL MEDICINE CLINIC | Facility: CLINIC | Age: 79
End: 2019-02-07

## 2019-02-07 DIAGNOSIS — R73.03 PRE-DIABETES: Primary | ICD-10-CM

## 2019-02-07 DIAGNOSIS — E78.00 HYPERCHOLESTEREMIA: ICD-10-CM

## 2019-02-07 NOTE — TELEPHONE ENCOUNTER
Pt stopped for labs today & no orders in the system - he is scheduled for appt with Dr Nicolle Uribe on Feb 25      - states he is frustrated since he fasted for testing today     Please call next week to confirm lab orders     [phone]

## 2019-02-10 NOTE — TELEPHONE ENCOUNTER
To nursing. Please tell pt orders are in the system for him to do fasting lab before 2/25/19 visit--cbc cmp lipid A1c. Thanks. 1. Pre-diabetes  - HEMOGLOBIN A1C; Future    2. Hypercholesteremia  - CBC WITH DIFFERENTIAL WITH PLATELET;  Future  - COMP M

## 2019-02-10 NOTE — TELEPHONE ENCOUNTER
This is a duplicate request--see other template of 9/1/54.   lab before 2/25/19 visit--cbc cmp lipid A1c.

## 2019-02-12 ENCOUNTER — LAB ENCOUNTER (OUTPATIENT)
Dept: LAB | Age: 79
End: 2019-02-12
Attending: INTERNAL MEDICINE
Payer: MEDICARE

## 2019-02-12 DIAGNOSIS — E78.00 HYPERCHOLESTEREMIA: ICD-10-CM

## 2019-02-12 DIAGNOSIS — R73.03 PRE-DIABETES: ICD-10-CM

## 2019-02-12 LAB
ALBUMIN SERPL BCP-MCNC: 3.9 G/DL (ref 3.5–4.8)
ALBUMIN/GLOB SERPL: 1.3 {RATIO} (ref 1–2)
ALP SERPL-CCNC: 58 U/L (ref 32–100)
ALT SERPL-CCNC: 31 U/L (ref 17–63)
ANION GAP SERPL CALC-SCNC: 10 MMOL/L (ref 0–18)
AST SERPL-CCNC: 26 U/L (ref 15–41)
BASOPHILS # BLD AUTO: 0.03 X10(3) UL (ref 0–0.2)
BASOPHILS NFR BLD AUTO: 0.6 %
BILIRUB SERPL-MCNC: 0.4 MG/DL (ref 0.3–1.2)
BUN SERPL-MCNC: 18 MG/DL (ref 8–20)
BUN/CREAT SERPL: 18.6 (ref 10–20)
CALCIUM SERPL-MCNC: 8.5 MG/DL (ref 8.5–10.5)
CHLORIDE SERPL-SCNC: 100 MMOL/L (ref 95–110)
CHOLEST SERPL-MCNC: 183 MG/DL (ref 110–200)
CO2 SERPL-SCNC: 30 MMOL/L (ref 22–32)
CREAT SERPL-MCNC: 0.97 MG/DL (ref 0.5–1.5)
DEPRECATED RDW RBC AUTO: 43.9 FL (ref 35.1–46.3)
EOSINOPHIL # BLD AUTO: 0.13 X10(3) UL (ref 0–0.7)
EOSINOPHIL NFR BLD AUTO: 2.4 %
ERYTHROCYTE [DISTWIDTH] IN BLOOD BY AUTOMATED COUNT: 13.4 % (ref 11–15)
EST. AVERAGE GLUCOSE BLD GHB EST-MCNC: 123 MG/DL (ref 68–126)
GLOBULIN PLAS-MCNC: 2.9 G/DL (ref 2.5–3.7)
GLUCOSE SERPL-MCNC: 92 MG/DL (ref 70–99)
HBA1C MFR BLD HPLC: 5.9 % (ref ?–5.7)
HCT VFR BLD AUTO: 41.9 % (ref 39–53)
HDLC SERPL-MCNC: 56 MG/DL
HGB BLD-MCNC: 13.8 G/DL (ref 13–17.5)
IMM GRANULOCYTES # BLD AUTO: 0.01 X10(3) UL (ref 0–1)
IMM GRANULOCYTES NFR BLD: 0.2 %
LDLC SERPL CALC-MCNC: 105 MG/DL (ref 0–99)
LYMPHOCYTES # BLD AUTO: 2.77 X10(3) UL (ref 1–4)
LYMPHOCYTES NFR BLD AUTO: 51.8 %
MCH RBC QN AUTO: 29.9 PG (ref 26–34)
MCHC RBC AUTO-ENTMCNC: 32.9 G/DL (ref 31–37)
MCV RBC AUTO: 90.9 FL (ref 80–100)
MONOCYTES # BLD AUTO: 0.46 X10(3) UL (ref 0.1–1)
MONOCYTES NFR BLD AUTO: 8.6 %
NEUTROPHILS # BLD AUTO: 1.95 X10 (3) UL (ref 1.5–7.7)
NEUTROPHILS # BLD AUTO: 1.95 X10(3) UL (ref 1.5–7.7)
NEUTROPHILS NFR BLD AUTO: 36.4 %
NONHDLC SERPL-MCNC: 127 MG/DL
OSMOLALITY UR CALC.SUM OF ELEC: 292 MOSM/KG (ref 275–295)
PATIENT FASTING: YES
PLATELET # BLD AUTO: 236 10(3)UL (ref 150–450)
POTASSIUM SERPL-SCNC: 4.5 MMOL/L (ref 3.3–5.1)
PROT SERPL-MCNC: 6.8 G/DL (ref 5.9–8.4)
RBC # BLD AUTO: 4.61 X10(6)UL (ref 3.8–5.8)
SODIUM SERPL-SCNC: 140 MMOL/L (ref 136–144)
TRIGL SERPL-MCNC: 108 MG/DL (ref 1–149)
WBC # BLD AUTO: 5.4 X10(3) UL (ref 4–11)

## 2019-02-12 PROCEDURE — 80053 COMPREHEN METABOLIC PANEL: CPT

## 2019-02-12 PROCEDURE — 36415 COLL VENOUS BLD VENIPUNCTURE: CPT

## 2019-02-12 PROCEDURE — 83036 HEMOGLOBIN GLYCOSYLATED A1C: CPT

## 2019-02-12 PROCEDURE — 85025 COMPLETE CBC W/AUTO DIFF WBC: CPT

## 2019-02-12 PROCEDURE — 80061 LIPID PANEL: CPT

## 2019-02-25 ENCOUNTER — OFFICE VISIT (OUTPATIENT)
Dept: INTERNAL MEDICINE CLINIC | Facility: CLINIC | Age: 79
End: 2019-02-25
Payer: MEDICARE

## 2019-02-25 VITALS
HEART RATE: 60 BPM | WEIGHT: 147 LBS | TEMPERATURE: 98 F | HEIGHT: 68 IN | SYSTOLIC BLOOD PRESSURE: 120 MMHG | DIASTOLIC BLOOD PRESSURE: 64 MMHG | BODY MASS INDEX: 22.28 KG/M2

## 2019-02-25 DIAGNOSIS — R73.03 PRE-DIABETES: ICD-10-CM

## 2019-02-25 DIAGNOSIS — Z12.5 PROSTATE CANCER SCREENING: ICD-10-CM

## 2019-02-25 DIAGNOSIS — E78.00 HYPERCHOLESTEREMIA: Primary | ICD-10-CM

## 2019-02-25 PROCEDURE — G0463 HOSPITAL OUTPT CLINIC VISIT: HCPCS | Performed by: INTERNAL MEDICINE

## 2019-02-25 PROCEDURE — 99214 OFFICE O/P EST MOD 30 MIN: CPT | Performed by: INTERNAL MEDICINE

## 2019-02-25 NOTE — PROGRESS NOTES
CHIEF COMPLAINT:  1- Left foot pain/ foot contusion  2- Metatarsalgia L>R  3- Edema bilateral R>L leg    DATE OF INJURY:  April 2018    HISTORY:  Mr. An Dangelo 69 y.o.   male  presents today for f/u evaluation of a left foot injury which occurred when he jumped over a baby gate and injured his foot. He had an abrasion on the lateral part of his left foot at the time which is healed. He is complaining of foot pain and swelling that is not improving. Rates pain a 4/10 VAS aching and worse as the day goes on. This is better with elevation and worse with bearing wt. The pain is achy and not radiating. No other complaint. He also c/o plantar bilateral forefoot pain and leg swelling. Past Medical History:   Diagnosis Date    Allergic rhinitis, cause unspecified     Anxiety state, unspecified     Esophageal reflux     High blood pressure     [place] (hard of hearing)     Hypertension     Impotence of organic origin     Other dyspnea and respiratory abnormality     Unspecified sleep apnea     does not use a Cpap machine    Wears glasses     Wears hearing aid     bilateral       Past Surgical History:   Procedure Laterality Date    CARPAL TUNNEL RELEASE Right     CHOLECYSTECTOMY      FINGER TRIGGER RELEASE Right     middle finger    FINGER TRIGGER RELEASE Left 4/9/15    ring finger    HERNIA REPAIR      KNEE ARTHROSCOPY Bilateral     SHOULDER SURGERY Right     scoped       Social History     Social History    Marital status:      Spouse name: N/A    Number of children: N/A    Years of education: N/A     Occupational History    Not on file.      Social History Main Topics    Smoking status: Former Smoker    Smokeless tobacco: Never Used      Comment: quit:  1992    Alcohol use 0.0 oz/week      Comment: socially    Drug use: No    Sexual activity: Yes     Partners: Female     Other Topics Concern    Not on file     Social History Narrative    No narrative on file       Family History Teja Shea is a 70year old male who presents for     6 mo check (asks medicare annual next visit)    Feels good. Had cataract surgery at [facility] clinic 2/1/19 -L eye.  Will do R eye 3/1/19.      Hx R low back pain w sciatica-  \"The back is fine was on Latanoprost qhsOU - previously treated for OHT OD   • Primary open angle glaucoma of left eye 3/8/16    3/8/16 started on Latanoprost qhs OU by PPS- due to [address] OU (36/41)and thinning of OCT OS- treating OHT OD and COAG OS   • Prostate cancer ([facility] [address] 2 chest pain or palpitations  Gastrointestinal: No abdominal pain, vomiting, diarrhea or constipation      EXAM:   /64   Pulse 60   Temp 97.5 °F (36.4 °C) (Oral)   Ht 5' 8\" (1.727 m)   Wt 147 lb (66.7 kg)   BMI 22.35 kg/m²      Wt Readings from Last 6 not need further follow up--hx of prostate CA prostatectomy in 2000.    PSA 0.0 on 8/10/17 and 8/6/18.      Takes occas valtrex for cold sores.      Lab 8/10/17-cbc cmp tsh lipid ua psa--results ok except hgb 13- was 14 in 8/2016.  fe sat 16%,, ferritin nl then continue twice a day during [place].  IF SYMPTOMS OF ALTITUDE SICKNESS OCCUR: take 2-3 times a day until symptoms resolve or significantly improve; the BEST and PREFERRED TREATMENT IS DESCENT Disp: 20 tablet Rfl: 0         Anjelica

## 2019-04-17 ENCOUNTER — OFFICE VISIT (OUTPATIENT)
Dept: INTERNAL MEDICINE CLINIC | Facility: CLINIC | Age: 79
End: 2019-04-17
Payer: MEDICARE

## 2019-04-17 VITALS
DIASTOLIC BLOOD PRESSURE: 60 MMHG | HEIGHT: 68 IN | SYSTOLIC BLOOD PRESSURE: 116 MMHG | WEIGHT: 150 LBS | HEART RATE: 64 BPM | BODY MASS INDEX: 22.73 KG/M2 | TEMPERATURE: 98 F

## 2019-04-17 DIAGNOSIS — J01.90 ACUTE NON-RECURRENT SINUSITIS, UNSPECIFIED LOCATION: Primary | ICD-10-CM

## 2019-04-17 PROBLEM — M47.816 SPONDYLOSIS OF LUMBAR REGION WITHOUT MYELOPATHY OR RADICULOPATHY: Status: ACTIVE | Noted: 2019-04-17

## 2019-04-17 PROCEDURE — 99213 OFFICE O/P EST LOW 20 MIN: CPT | Performed by: INTERNAL MEDICINE

## 2019-04-17 PROCEDURE — G0463 HOSPITAL OUTPT CLINIC VISIT: HCPCS | Performed by: INTERNAL MEDICINE

## 2019-04-17 RX ORDER — CEFUROXIME AXETIL 250 MG/1
250 TABLET ORAL 2 TIMES DAILY
Qty: 20 TABLET | Refills: 0 | Status: SHIPPED | OUTPATIENT
Start: 2019-04-17 | End: 2019-08-05 | Stop reason: ALTCHOICE

## 2019-04-17 NOTE — PROGRESS NOTES
Grace Hayes is a 70year old malewho presents with     cold symptoms. Onset: 1 mo ago  Started with: sore throat, then nasal congestion.    Associated symptoms: nasal drainage \"fairly clear\", a little post nasal drip first thing in monring, \"not on Latanoprost qhsOU - previously treated for OHT OD   • Primary open angle glaucoma of left eye 3/8/16    3/8/16 started on Latanoprost qhs OU by PPS- due to [address] OU (36/41)and thinning of OCT OS- treating OHT OD and COAG OS   • Prostate cancer ([facility] [address] 2000 Wt Readings from Last 6 Encounters:  04/17/19 : 150 lb (68 kg)  02/25/19 : 147 lb (66.7 kg)  11/27/18 : 145 lb (65.8 kg)  11/27/18 : 150 lb (68 kg)  08/27/18 : 146 lb (66.2 kg)  08/15/18 : 148 lb (67.1 kg)      General: alert and in no acute distress  HE

## 2019-05-21 ENCOUNTER — TELEPHONE (OUTPATIENT)
Dept: INTERNAL MEDICINE CLINIC | Facility: CLINIC | Age: 79
End: 2019-05-21

## 2019-05-21 DIAGNOSIS — Z01.10 EVALUATION OF HEARING IMPAIRMENT: Primary | ICD-10-CM

## 2019-05-21 DIAGNOSIS — Z71.89 HEARING AID CONSULTATION: ICD-10-CM

## 2019-05-21 NOTE — TELEPHONE ENCOUNTER
I spoke with patient to let him know referral was entered and explained that it did not show as authorized. Patient will work on getting fax number. To [facility].

## 2019-05-21 NOTE — TELEPHONE ENCOUNTER
Referral pended for Dr. Oquendo Neither review. I tried to call Yu Royal, phone rings with no answer and no voicemail.

## 2019-05-21 NOTE — TELEPHONE ENCOUNTER
Pt needs an order for a [facility] phone #[phone] Audiologist Denilson Alexander ( tried calling to get fax, no answer )  Please call pt when completed [phone]    Tasked to nursing

## 2019-05-21 NOTE — TELEPHONE ENCOUNTER
To nursing, I signed the audiologist referral. Thanks. 1. Evaluation of hearing impairment  - ENT - EXTERNAL  2.  Hearing aid consultation  - ENT - EXTERNAL

## 2019-05-22 NOTE — TELEPHONE ENCOUNTER
Referral request was faxed to [facility] [phone]. Fax confirmation received. LMTCB to let patient know.

## 2019-05-22 NOTE — TELEPHONE ENCOUNTER
Pt called back and was informed that the referral was faxed to [facility] but still pending.

## 2019-07-22 NOTE — TELEPHONE ENCOUNTER
Medication request: baclofen 10mg Oral Tab, #90, no refills  Take 1 tablet by mouth 3 times daily.      LOV: 11/27/18  NOV: 8/14/19

## 2019-07-24 RX ORDER — BACLOFEN 10 MG/1
10 TABLET ORAL 3 TIMES DAILY PRN
Qty: 90 TABLET | Refills: 0 | Status: SHIPPED | OUTPATIENT
Start: 2019-07-24 | End: 2019-08-23

## 2019-07-24 NOTE — TELEPHONE ENCOUNTER
S/w pt who confirmed he takes Baclofen only as needed - would like refill sent to [facility] in [place]. Prescription sent accordingly.

## 2019-07-29 ENCOUNTER — TELEPHONE (OUTPATIENT)
Dept: PHYSICAL THERAPY | Facility: HOSPITAL | Age: 79
End: 2019-07-29

## 2019-08-05 ENCOUNTER — OFFICE VISIT (OUTPATIENT)
Dept: NEUROLOGY | Facility: CLINIC | Age: 79
End: 2019-08-05
Payer: MEDICARE

## 2019-08-05 VITALS — HEART RATE: 58 BPM | DIASTOLIC BLOOD PRESSURE: 60 MMHG | SYSTOLIC BLOOD PRESSURE: 118 MMHG | RESPIRATION RATE: 16 BRPM

## 2019-08-05 DIAGNOSIS — M54.16 LUMBAR RADICULOPATHY: Primary | ICD-10-CM

## 2019-08-05 DIAGNOSIS — M51.9 LUMBAR DISC DISEASE: ICD-10-CM

## 2019-08-05 DIAGNOSIS — M43.16 SPONDYLOLISTHESIS OF LUMBAR REGION: ICD-10-CM

## 2019-08-05 DIAGNOSIS — M48.061 LUMBAR FORAMINAL STENOSIS: ICD-10-CM

## 2019-08-05 DIAGNOSIS — M51.26 LUMBAR HERNIATED DISC: ICD-10-CM

## 2019-08-05 DIAGNOSIS — M48.061 SPINAL STENOSIS OF LUMBAR REGION WITHOUT NEUROGENIC CLAUDICATION: ICD-10-CM

## 2019-08-05 PROCEDURE — 99214 OFFICE O/P EST MOD 30 MIN: CPT | Performed by: PHYSICAL MEDICINE & REHABILITATION

## 2019-08-05 NOTE — PROGRESS NOTES
Low Back Pain H & P    Chief Complaint: Patient presents with:  Pain: pt here for follow up with c/o discomfort in the right thigh that is aggravated with bending/twisting that has become more frequent. takes Baclofen 10mg prn which is helpful.  did not porfirio 1999    EGD 1999, 2002, 2007  Dr Den Howard    • Hyperlipidemia    • Kidney stones 1980    removed via cystoscopy at [address]   • Lumbar degenerative disc disease     Dr Rafael Messina did LESIs 2013, 2014, 2015; has seen Dr. Rafat Antonio and has • Other (overweight[other]) Brother    • Other (back surgery[other]) Brother    • Other (1 brother[other]) Other    • Breast Cancer Daughter 39        DCIS-had double mastectomy   • Glaucoma Neg    • Macular degeneration Neg        Social History   Soci Yes          2 cups         Occupational Exposure: Not Asked        Hobby Hazards: Not Asked        Sleep Concern: Not Asked        Stress Concern: Not Asked        Weight Concern: Not Asked        Special Diet: Not Asked        Back Care: Not Asked pulse-LEFT 2+   Tibialis posterior pulse-RIGHT 2+   Tibialis posterior pulse-LEFT 2+     Neurological Lower Extremity:    Light Touch Sensation: Intact in bilateral Lower Extremities   LE Muscle Strength:  All LE strength measurements 5/5  All LE strength m

## 2019-08-05 NOTE — PATIENT INSTRUCTIONS
As of October 6th 2014, the Drug Enforcement Agency [facility]) is reclassifying all hydrocodone combination medications from Schedule III to Schedule II. This includes medications such as Norco, Vicodin, Lortab, Zohydro, and Vicoprofen.     What this means for y will start PT on the lumbar spine. He will take the Baclofen as needed as he has been doing. He will take the Medrol Dosepak only if needed.     The patient will continue with his home exercise program.    The patient does not need any injections at t

## 2019-08-06 ENCOUNTER — OFFICE VISIT (OUTPATIENT)
Dept: PHYSICAL THERAPY | Facility: HOSPITAL | Age: 79
End: 2019-08-06
Attending: PHYSICAL MEDICINE & REHABILITATION
Payer: MEDICARE

## 2019-08-06 DIAGNOSIS — M54.16 LUMBAR RADICULOPATHY: ICD-10-CM

## 2019-08-06 DIAGNOSIS — M43.16 SPONDYLOLISTHESIS OF LUMBAR REGION: ICD-10-CM

## 2019-08-06 DIAGNOSIS — M51.9 LUMBAR DISC DISEASE: ICD-10-CM

## 2019-08-06 DIAGNOSIS — M51.26 LUMBAR HERNIATED DISC: ICD-10-CM

## 2019-08-06 DIAGNOSIS — M48.061 LUMBAR FORAMINAL STENOSIS: ICD-10-CM

## 2019-08-06 DIAGNOSIS — M48.061 SPINAL STENOSIS OF LUMBAR REGION WITHOUT NEUROGENIC CLAUDICATION: ICD-10-CM

## 2019-08-06 PROCEDURE — 97530 THERAPEUTIC ACTIVITIES: CPT | Performed by: PHYSICAL THERAPIST

## 2019-08-06 PROCEDURE — 97110 THERAPEUTIC EXERCISES: CPT | Performed by: PHYSICAL THERAPIST

## 2019-08-06 PROCEDURE — 97162 PT EVAL MOD COMPLEX 30 MIN: CPT | Performed by: PHYSICAL THERAPIST

## 2019-08-06 NOTE — PROGRESS NOTES
LUMBAR SPINE EVALUATION:   Referring Physician: Dr. Juliana Tucker  Diagnosis:     Lumbar radiculopathy (M54.16)  Lumbar herniated disc (M51.26)  Lumbar foraminal stenosis (M48.061)  Lumbar disc disease (M51.9)  Spondylolisthesis of lumbar region (M43.16)  Spina reviewed with Lev Sands. Significant findings include. Past Medical History        Past Medical History:   Diagnosis Date   • Abnormal MRI, knee 05/2017     MRI R knee 5/2017 per Dr. Grant Garza meniscal tear and MCL sprain. physical therapy.    • Aller Past Surgical History:   Procedure Laterality Date   • CATARACT Left 02/01/2019     [facility]   • CATARACT Right 03/01/2019   • COLONOSCOPY   2007 2013     Dr Deondre Herron at [address] [place] [address] - 11-99,5-02,6-07-Dr Deondre Herron osteophyte formation is seen. Endplate signal abnormalities are likely degenerative in nature. CORD/CAUDA EQUINA:            The conus medullaris terminates at the level of the L2 vertebral segment.  The distal cord and nerve roots have normal caliber, con right and moderate left foraminal encroachment. L5-S1:   A diffuse disc bulge is observed superimposed broad-based central protrusion. There is facet arthrosis bilaterally.  These findings cause asymmetric severe right foraminal narrowing with moderate lef 5 5    Knee Extension (L3) 5 5    Knee Flexion 5 5    Ankle DF (L4) 5 4    EHL (L5) 5 4    Ankle PF (S1) 5 5      Palpation: TTP of the R QL  Neuro Screen: (-) heel walking, (-) toe walking bilaterally  Special Tests: (-) SLR    Outcome Surverys  Date 8/6/ Modalities as needed. Education or treatment limitation: None  Rehab Potential:good    Patient was advised of these findings, precautions, and treatment options and has agreed to actively participate in planning and for this course of care.     Thank you

## 2019-08-08 ENCOUNTER — OFFICE VISIT (OUTPATIENT)
Dept: PHYSICAL THERAPY | Facility: HOSPITAL | Age: 79
End: 2019-08-08
Attending: PHYSICAL MEDICINE & REHABILITATION
Payer: MEDICARE

## 2019-08-08 PROCEDURE — 97530 THERAPEUTIC ACTIVITIES: CPT | Performed by: PHYSICAL THERAPIST

## 2019-08-08 PROCEDURE — 97110 THERAPEUTIC EXERCISES: CPT | Performed by: PHYSICAL THERAPIST

## 2019-08-08 NOTE — PROGRESS NOTES
8/8/2019  Dx:     Lumbar radiculopathy (M54.16)  Lumbar herniated disc (M51.26)  Lumbar foraminal stenosis (M48.061)  Lumbar disc disease (M51.9)  Spondylolisthesis of lumbar region (M43.16)  Spinal stenosis of lumbar region without neurogenic claudication kimi.      Frequency / Duration: Patient will be seen for 1-2 x/week or a total of 10 visits over a 90 day period. Treatment will include: Manual Therapy; Therapeutic Exercises; Neuromuscular Re-education; Therapeutic Activity;   Patient education: Home ex playing golf the other day. Reports sitting will help decrease his pain. History of current condition: had pain off and on for the last 7 years. Reports he was getting an injection about every 18 months.   Has done therapy with Michaela Hernandez in the past.  Wa new diagnosis of COAG OD- based on progression of OCT OD- patient already was on Latanoprost qhsOU - previously treated for OHT OD   • Primary open angle glaucoma of left eye 3/8/16     3/8/16 started on Latanoprost qhs OU by PPS- due to [address] OU (36/41)and the transitional body and the remainder of the sacral spine. For the purposes of this examination, the lowest fully formed disc space is designated as L5-S1. The transitional body is designated S1 with the rudimentary disc   designated S1-S2.  Images have b stenosis and bilateral subarticular zone impingement of the transiting L4 nerve roots and moderate bilateral foraminal stenoses involving the exiting L3 nerve roots.   L4-L5:   A diffuse disc bulge is manifested with a small broad-based central protrusion a skilled PT to return to his PLOF.     Precautions: None     OBJECTIVE:   Observation/Posture: forward flexed posture with ipsilateral shift  Gait: decreased hip extension bilaterally  ROM:     Trunk         Pain (+/-)   Flexion Limited 50%      Hands to bethany

## 2019-08-09 ENCOUNTER — TELEPHONE (OUTPATIENT)
Dept: NEUROLOGY | Facility: CLINIC | Age: 79
End: 2019-08-09

## 2019-08-09 ENCOUNTER — LAB ENCOUNTER (OUTPATIENT)
Dept: LAB | Age: 79
End: 2019-08-09
Attending: INTERNAL MEDICINE
Payer: MEDICARE

## 2019-08-09 DIAGNOSIS — Z12.5 PROSTATE CANCER SCREENING: ICD-10-CM

## 2019-08-09 DIAGNOSIS — M48.061 SPINAL STENOSIS OF LUMBAR REGION WITHOUT NEUROGENIC CLAUDICATION: ICD-10-CM

## 2019-08-09 DIAGNOSIS — R73.03 PRE-DIABETES: ICD-10-CM

## 2019-08-09 DIAGNOSIS — M54.16 LUMBAR RADICULOPATHY: Primary | ICD-10-CM

## 2019-08-09 DIAGNOSIS — M43.16 SPONDYLOLISTHESIS AT L4-L5 LEVEL: ICD-10-CM

## 2019-08-09 DIAGNOSIS — E78.00 HYPERCHOLESTEREMIA: ICD-10-CM

## 2019-08-09 LAB
ALBUMIN SERPL-MCNC: 3.9 G/DL (ref 3.4–5)
ALBUMIN/GLOB SERPL: 1.3 {RATIO} (ref 1–2)
ALP LIVER SERPL-CCNC: 52 U/L (ref 45–117)
ALT SERPL-CCNC: 33 U/L (ref 16–61)
ANION GAP SERPL CALC-SCNC: 8 MMOL/L (ref 0–18)
AST SERPL-CCNC: 22 U/L (ref 15–37)
BACTERIA UR QL AUTO: NEGATIVE /HPF
BASOPHILS # BLD AUTO: 0.03 X10(3) UL (ref 0–0.2)
BASOPHILS NFR BLD AUTO: 0.6 %
BILIRUB SERPL-MCNC: 0.4 MG/DL (ref 0.1–2)
BILIRUB UR QL: NEGATIVE
BUN BLD-MCNC: 19 MG/DL (ref 7–18)
BUN/CREAT SERPL: 18.8 (ref 10–20)
CALCIUM BLD-MCNC: 8.9 MG/DL (ref 8.5–10.1)
CHLORIDE SERPL-SCNC: 105 MMOL/L (ref 98–112)
CHOLEST SMN-MCNC: 168 MG/DL (ref ?–200)
CLARITY UR: CLEAR
CO2 SERPL-SCNC: 27 MMOL/L (ref 21–32)
COLOR UR: YELLOW
COMPLEXED PSA SERPL-MCNC: <0.01 NG/ML (ref ?–4)
CREAT BLD-MCNC: 1.01 MG/DL (ref 0.7–1.3)
DEPRECATED RDW RBC AUTO: 43.9 FL (ref 35.1–46.3)
EOSINOPHIL # BLD AUTO: 0.1 X10(3) UL (ref 0–0.7)
EOSINOPHIL NFR BLD AUTO: 1.9 %
ERYTHROCYTE [DISTWIDTH] IN BLOOD BY AUTOMATED COUNT: 13 % (ref 11–15)
EST. AVERAGE GLUCOSE BLD GHB EST-MCNC: 120 MG/DL (ref 68–126)
GLOBULIN PLAS-MCNC: 3 G/DL (ref 2.8–4.4)
GLUCOSE BLD-MCNC: 107 MG/DL (ref 70–99)
GLUCOSE UR-MCNC: NEGATIVE MG/DL
HBA1C MFR BLD HPLC: 5.8 % (ref ?–5.7)
HCT VFR BLD AUTO: 43.6 % (ref 39–53)
HDLC SERPL-MCNC: 70 MG/DL (ref 40–59)
HGB BLD-MCNC: 14.2 G/DL (ref 13–17.5)
HGB UR QL STRIP.AUTO: NEGATIVE
IMM GRANULOCYTES # BLD AUTO: 0.01 X10(3) UL (ref 0–1)
IMM GRANULOCYTES NFR BLD: 0.2 %
KETONES UR-MCNC: NEGATIVE MG/DL
LDLC SERPL CALC-MCNC: 86 MG/DL (ref ?–100)
LEUKOCYTE ESTERASE UR QL STRIP.AUTO: NEGATIVE
LYMPHOCYTES # BLD AUTO: 2.54 X10(3) UL (ref 1–4)
LYMPHOCYTES NFR BLD AUTO: 47.2 %
M PROTEIN MFR SERPL ELPH: 6.9 G/DL (ref 6.4–8.2)
MCH RBC QN AUTO: 29.6 PG (ref 26–34)
MCHC RBC AUTO-ENTMCNC: 32.6 G/DL (ref 31–37)
MCV RBC AUTO: 91 FL (ref 80–100)
MONOCYTES # BLD AUTO: 0.43 X10(3) UL (ref 0.1–1)
MONOCYTES NFR BLD AUTO: 8 %
NEUTROPHILS # BLD AUTO: 2.27 X10 (3) UL (ref 1.5–7.7)
NEUTROPHILS # BLD AUTO: 2.27 X10(3) UL (ref 1.5–7.7)
NEUTROPHILS NFR BLD AUTO: 42.1 %
NITRITE UR QL STRIP.AUTO: NEGATIVE
NONHDLC SERPL-MCNC: 98 MG/DL (ref ?–130)
OSMOLALITY SERPL CALC.SUM OF ELEC: 293 MOSM/KG (ref 275–295)
PATIENT FASTING: YES
PATIENT FASTING: YES
PH UR: 8 [PH] (ref 5–8)
PLATELET # BLD AUTO: 249 10(3)UL (ref 150–450)
POTASSIUM SERPL-SCNC: 5 MMOL/L (ref 3.5–5.1)
PROT UR-MCNC: NEGATIVE MG/DL
RBC # BLD AUTO: 4.79 X10(6)UL (ref 3.8–5.8)
RBC #/AREA URNS AUTO: <1 /HPF
SODIUM SERPL-SCNC: 140 MMOL/L (ref 136–145)
SP GR UR STRIP: 1.02 (ref 1–1.03)
TRIGL SERPL-MCNC: 59 MG/DL (ref 30–149)
TSI SER-ACNC: 2.22 MIU/ML (ref 0.36–3.74)
UROBILINOGEN UR STRIP-ACNC: <2
VIT C UR-MCNC: NEGATIVE MG/DL
VLDLC SERPL CALC-MCNC: 12 MG/DL (ref 0–30)
WBC # BLD AUTO: 5.4 X10(3) UL (ref 4–11)
WBC #/AREA URNS AUTO: 0 /HPF

## 2019-08-09 PROCEDURE — 80053 COMPREHEN METABOLIC PANEL: CPT

## 2019-08-09 PROCEDURE — 36415 COLL VENOUS BLD VENIPUNCTURE: CPT

## 2019-08-09 PROCEDURE — 83036 HEMOGLOBIN GLYCOSYLATED A1C: CPT

## 2019-08-09 PROCEDURE — 80061 LIPID PANEL: CPT

## 2019-08-09 PROCEDURE — 85025 COMPLETE CBC W/AUTO DIFF WBC: CPT

## 2019-08-09 PROCEDURE — 81003 URINALYSIS AUTO W/O SCOPE: CPT

## 2019-08-09 PROCEDURE — 84443 ASSAY THYROID STIM HORMONE: CPT

## 2019-08-09 NOTE — TELEPHONE ENCOUNTER
Medicare Online for insurance coverage of Right L3 & Right L5 TFESIs. cpt codes [id number], [id number], [id number]. Insurance was verified and procedure is a covered benefit and does not require authorization. Will inform Nursing.

## 2019-08-09 NOTE — TELEPHONE ENCOUNTER
Pt came to office to report he is feeling better after doing PT w/ Ousmane Hull. However, because he is leaving for vacation in September, both he and Ousmane Hull believe he should be scheduled for a repeat injection.  Per Dr. Viann Meckel, pt is to be scheduled for procedure (R

## 2019-08-13 ENCOUNTER — OFFICE VISIT (OUTPATIENT)
Dept: PHYSICAL THERAPY | Facility: HOSPITAL | Age: 79
End: 2019-08-13
Attending: PHYSICAL MEDICINE & REHABILITATION
Payer: MEDICARE

## 2019-08-13 PROCEDURE — 97110 THERAPEUTIC EXERCISES: CPT | Performed by: PHYSICAL THERAPIST

## 2019-08-13 PROCEDURE — 97530 THERAPEUTIC ACTIVITIES: CPT | Performed by: PHYSICAL THERAPIST

## 2019-08-13 NOTE — TELEPHONE ENCOUNTER
Pt called to check status on Valacyclovir    Asks if prescription can be sent to mail order/[facility] before his appt w.Dr Jose Enrique Delarosa on 8/26     - states he would like to receive medication from [facility] before he leaves on a trip Sept 3

## 2019-08-13 NOTE — PROGRESS NOTES
8/13/2019  Dx:     Lumbar radiculopathy (M54.16)  Lumbar herniated disc (M51.26)  Lumbar foraminal stenosis (M48.061)  Lumbar disc disease (M51.9)  Spondylolisthesis of lumbar region (M43.16)  Spinal stenosis of lumbar region without neurogenic claudicatio The pt will be able to travel without pain. 5.  The pt will displays functional lumbar ROM in all planes. 6.  The pt will be display upright posture without lateral shift, forward lean.       Frequency / Duration: Patient will be seen for 1-2 x/week or a morning, able to walk the dog. State he will do something to aggravate it and then he is stuck bent over. States bending and twisting seems to make his symptoms worse. Got stuck playing golf the other day. Reports sitting will help decrease his pain. taking Latanoprost OU qhs    • Pinguecula of both eyes 2007   • Prediabetes 2015     A1c of 5.8 in 1/2015   • Primary open angle glaucoma (POAG) of right eye 06/13/2018 6/14/18 new diagnosis of COAG OD- based on progression of OCT OD- patient already w body. 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 A rudimentary   disc is appreciated between the transitional body and the remainder of the sacral spine.  For the purp There is hypertrophic facet arthrosis with slight buckling of ligamentum flavum and mild prominence of the   dorsal epidural fat.  These findings result in moderate to severe spinal canal stenosis and bilateral subarticular zone impingement of the transitin kyphosis with ipsilateral shift over the R LE. He displays limited lumbar extension and R side glide and responded very well to repeated motions at the first visit. He will benefit from skilled PT to return to his PLOF.     Precautions: None     OBJECTIVE

## 2019-08-14 RX ORDER — VALACYCLOVIR HYDROCHLORIDE 1 G/1
TABLET, FILM COATED ORAL
Qty: 8 TABLET | Refills: 1 | Status: SHIPPED | OUTPATIENT
Start: 2019-08-14 | End: 2021-02-15

## 2019-08-19 ENCOUNTER — OFFICE VISIT (OUTPATIENT)
Dept: PHYSICAL THERAPY | Facility: HOSPITAL | Age: 79
End: 2019-08-19
Attending: PHYSICAL MEDICINE & REHABILITATION
Payer: MEDICARE

## 2019-08-19 PROCEDURE — 97110 THERAPEUTIC EXERCISES: CPT | Performed by: PHYSICAL THERAPIST

## 2019-08-19 PROCEDURE — 97140 MANUAL THERAPY 1/> REGIONS: CPT | Performed by: PHYSICAL THERAPIST

## 2019-08-19 PROCEDURE — 97530 THERAPEUTIC ACTIVITIES: CPT | Performed by: PHYSICAL THERAPIST

## 2019-08-19 NOTE — PROGRESS NOTES
8/19/2019  Dx:     Lumbar radiculopathy (M54.16)  Lumbar herniated disc (M51.26)  Lumbar foraminal stenosis (M48.061)  Lumbar disc disease (M51.9)  Spondylolisthesis of lumbar region (M43.16)  Spinal stenosis of lumbar region without neurogenic claudicatio will be independent in their HEP. 2.  Centralization of symptoms to the lumbar spine. 3.  The pt will be able to bend over to perform household repairs without an increase in pain. 4.  The pt will be able to travel without pain.   5.  The pt will display Date of Onset: 7 years ago    PATIENT SUMMARY   Marika Garduno is a 70year old y/o male who presents to therapy today with complaints of R sided LBP and R LE pain to the knee. Ok in the morning, able to walk the dog.   State he will do something to Cynthia neurosurg at [place].     • Lumbar herniated disc 1976     was hosp in traction and had back brace in 1976 after hurt back taboganning   • Ocular hypertension of right eye 3/2016     taking Latanoprost OU qhs    • Pinguecula of both eyes 2007 and parameters were utilized for visualization of suspected pathology. FINDINGS:          NUMERATION: Partial transitional lumbosacral anatomy is noted with lumbarization of the S1 vertebral body.  Well-formed pseudoarthroses are present between the tra to severe   bilateral foraminal impingement. L3-L4:   A diffuse disc bulge is demonstrated at superimposed broad-based central and bilateral foraminal protrusions (left greater than right).  There is hypertrophic facet arthrosis with slight buckling of lig therapy with a long hx of intermittent LBP and R LE pain to the knee. He had a previous episode of R DF weakness and continues to display some weakness in the R foot. He displays a lumbar kyphosis with ipsilateral shift over the R LE.   He displays limite

## 2019-08-23 ENCOUNTER — OFFICE VISIT (OUTPATIENT)
Dept: SURGERY | Facility: CLINIC | Age: 79
End: 2019-08-23
Payer: MEDICARE

## 2019-08-23 DIAGNOSIS — M48.061 LUMBAR FORAMINAL STENOSIS: ICD-10-CM

## 2019-08-23 DIAGNOSIS — M51.26 LUMBAR HERNIATED DISC: ICD-10-CM

## 2019-08-23 DIAGNOSIS — M48.061 SPINAL STENOSIS OF LUMBAR REGION WITHOUT NEUROGENIC CLAUDICATION: ICD-10-CM

## 2019-08-23 DIAGNOSIS — M54.16 LUMBAR RADICULOPATHY: Primary | ICD-10-CM

## 2019-08-23 PROCEDURE — 64483 NJX AA&/STRD TFRM EPI L/S 1: CPT | Performed by: PHYSICAL MEDICINE & REHABILITATION

## 2019-08-23 PROCEDURE — 64484 NJX AA&/STRD TFRM EPI L/S EA: CPT | Performed by: PHYSICAL MEDICINE & REHABILITATION

## 2019-08-23 NOTE — PROCEDURES
[address]    2-LEVEL LUMBAR TRANSFORAMINAL   NAME:  Estelle Daly    MR #:    [id number] :  1940     PHYSICIAN:  Rakel Swain        Operative Report    DATE OF PROCEDURE: 2019   PREOPERATIVE DIAGNOSES: 1. right > the right L3 and right L5 pedicles. At this point in time, Omnipaque-240 contrast was used to obtain a good epidurogram indicating correct needle placement at each level. Then, aspiration was performed. No blood, fluid, or air was aspirated.   Then, the

## 2019-08-26 ENCOUNTER — OFFICE VISIT (OUTPATIENT)
Dept: INTERNAL MEDICINE CLINIC | Facility: CLINIC | Age: 79
End: 2019-08-26
Payer: MEDICARE

## 2019-08-26 VITALS
HEART RATE: 69 BPM | SYSTOLIC BLOOD PRESSURE: 130 MMHG | TEMPERATURE: 99 F | OXYGEN SATURATION: 98 % | HEIGHT: 68 IN | DIASTOLIC BLOOD PRESSURE: 80 MMHG | BODY MASS INDEX: 22.01 KG/M2 | WEIGHT: 145.19 LBS

## 2019-08-26 DIAGNOSIS — R73.03 PRE-DIABETES: ICD-10-CM

## 2019-08-26 DIAGNOSIS — K21.9 GASTROESOPHAGEAL REFLUX DISEASE WITHOUT ESOPHAGITIS: ICD-10-CM

## 2019-08-26 DIAGNOSIS — E78.00 HYPERCHOLESTEREMIA: Primary | ICD-10-CM

## 2019-08-26 PROCEDURE — G0463 HOSPITAL OUTPT CLINIC VISIT: HCPCS | Performed by: INTERNAL MEDICINE

## 2019-08-26 PROCEDURE — 99214 OFFICE O/P EST MOD 30 MIN: CPT | Performed by: INTERNAL MEDICINE

## 2019-08-26 RX ORDER — BACLOFEN 10 MG/1
TABLET ORAL
Qty: 90 TABLET | Refills: 0 | Status: SHIPPED | OUTPATIENT
Start: 2019-08-26 | End: 2020-07-08

## 2019-08-26 RX ORDER — SCOLOPAMINE TRANSDERMAL SYSTEM 1 MG/1
1 PATCH, EXTENDED RELEASE TRANSDERMAL
Qty: 10 PATCH | Refills: 0 | Status: SHIPPED | OUTPATIENT
Start: 2019-08-26 | End: 2019-12-06

## 2019-08-26 NOTE — TELEPHONE ENCOUNTER
Medication request: baclofen 10mg Oral Tab, #90, no refills  Take 1 tablet by mouth 3 times daily PRN    [place]/Last refill: 7/24/19    LOV: 8/05/19  NOV: 11/11/19

## 2019-08-26 NOTE — PROGRESS NOTES
Grace Hayes is a 70year old male who presents for     6 mo check     Feels good.      Hx R low back pain w sciatica-Dr Swain gave R L3 and L5 TFESI on 8/23/19. Going on cruise [place] to [place] and asks patch for seasickness.       Hyperlipi COAG OS   • Prostate cancer ([address] 2000    prostatectomy at [address] [place] [address] Road   • Skin lesion of face 2015    sees derm Dr. Emre Cárdenas in [facility]   • Superficial punctate keratitis 2010    left   • Thoracic spondylosis       Past Surgical History:   Pro 8\" (1.727 m)   Wt 145 lb 3.2 oz (65.9 kg)   SpO2 98%   BMI 22.08 kg/m²      Wt Readings from Last 6 Encounters:  08/26/19 : 145 lb 3.2 oz (65.9 kg)  04/17/19 : 150 lb (68 kg)  02/25/19 : 147 lb (66.7 kg)  11/27/18 : 145 lb (65.8 kg)  11/27/18 : 150 lb (68    Lab 8/6/18- cbc cmp tsh lipid A1c UA psa-results ok. Lab 2/12/19-cbc cmp lipid U8e--, A1c 5.9. Lab 8/9/19--cbc cmp tsh lipid A1c UA psa-results ok. Ret in 6 mo with cmp lipid A1c.  Medicare annual then.      Patient expresses understanding

## 2019-08-28 RX ORDER — ROSUVASTATIN CALCIUM 20 MG/1
TABLET, COATED ORAL
Qty: 90 TABLET | Refills: 3 | Status: SHIPPED | OUTPATIENT
Start: 2019-08-28 | End: 2020-07-16

## 2019-09-19 ENCOUNTER — OFFICE VISIT (OUTPATIENT)
Dept: PHYSICAL THERAPY | Facility: HOSPITAL | Age: 79
End: 2019-09-19
Attending: PHYSICAL MEDICINE & REHABILITATION
Payer: MEDICARE

## 2019-09-19 PROCEDURE — 97110 THERAPEUTIC EXERCISES: CPT | Performed by: PHYSICAL THERAPIST

## 2019-09-19 NOTE — PROGRESS NOTES
9/19/2019  Dx:     Lumbar radiculopathy (M54.16)  Lumbar herniated disc (M51.26)  Lumbar foraminal stenosis (M48.061)  Lumbar disc disease (M51.9)  Spondylolisthesis of lumbar region (M43.16)  Spinal stenosis of lumbar region without neurogenic claudicatio plan of care, purpose and individual goals for therapy, precautions for therapy. All questions were answered. 1.  The pt will be independent in their HEP. 2.  Centralization of symptoms to the lumbar spine.   3.  The pt will be able to bend over to pe    1-3 times/week for 4-6 weeks     Lumbar stabilization with neural mobilization.   HEP Date of Service: 8/6/2019   Date of Onset: 7 years ago    PATIENT SUMMARY   Uriel Mendez is a 70year old y/o male who presents to therapy today with complaints o Lumbar degenerative disc disease       Dr Lenard Nicole did LESIs 2013, 2014, 2015; has seen Dr. Virginia Daniel and [place] [facility]     • Lumbar herniated disc 1976     was hosp in traction and had back brace in 1976 after hurt back taboga symptomatology radiating to the right lower leg. No trauma. (M51.26, M51.36)     TECHNIQUE:    A variety of imaging planes and parameters were utilized for visualization of suspected pathology.      FINDINGS:          NUMERATION: Partial transitional lumbos ligamentum flavum. These findings cause moderate spinal canal narrowing bilateral subarticular zone encroachment with moderate to severe   bilateral foraminal impingement.   L3-L4:   A diffuse disc bulge is demonstrated at superimposed broad-based central a within the gallbladder. 5. Lesser incidental findings as above. ASSESSMENT:   Mr. Nima Abarca presents to therapy with a long hx of intermittent LBP and R LE pain to the knee.   He had a previous episode of R DF weakness and continues

## 2019-09-24 ENCOUNTER — APPOINTMENT (OUTPATIENT)
Dept: PHYSICAL THERAPY | Facility: HOSPITAL | Age: 79
End: 2019-09-24
Attending: PHYSICAL MEDICINE & REHABILITATION
Payer: MEDICARE

## 2019-10-01 ENCOUNTER — APPOINTMENT (OUTPATIENT)
Dept: PHYSICAL THERAPY | Facility: HOSPITAL | Age: 79
End: 2019-10-01
Attending: PHYSICAL MEDICINE & REHABILITATION
Payer: MEDICARE

## 2019-10-01 PROCEDURE — 97110 THERAPEUTIC EXERCISES: CPT | Performed by: PHYSICAL THERAPIST

## 2019-10-01 NOTE — PROGRESS NOTES
10/1/2019    Patient Name: Estelle Daly  YOB: 1940          MRN number:  [id number]  Referring Physician:  Haile Roldan    Discharge Summary    Pt has attended 6, cancelled 0, and no shown 0 visits in Physical Therapy.    Dx:     Lumbar LE    Standing lumbar extension     Gastro stretch     Soleus stretch    Heels slides in sitting    Wall push ups - 2 positions Reviewed HEP   Therapeutic Activity Use of lumbar roll    Golfers reach  Full squat  Avoiding betting  Education on centralizati Timed Treatment: 15 min  Total Treatment Time: 15 min       Patient was advised of these findings, precautions, and treatment options and has agreed to actively participate in planning and for this course of care.     Thank you for your referral. Please co-

## 2019-10-08 ENCOUNTER — APPOINTMENT (OUTPATIENT)
Dept: PHYSICAL THERAPY | Facility: HOSPITAL | Age: 79
End: 2019-10-08
Attending: PHYSICAL MEDICINE & REHABILITATION
Payer: MEDICARE

## 2019-10-15 ENCOUNTER — APPOINTMENT (OUTPATIENT)
Dept: PHYSICAL THERAPY | Facility: HOSPITAL | Age: 79
End: 2019-10-15
Attending: PHYSICAL MEDICINE & REHABILITATION
Payer: MEDICARE

## 2019-10-24 ENCOUNTER — NURSE ONLY (OUTPATIENT)
Dept: INTERNAL MEDICINE CLINIC | Facility: CLINIC | Age: 79
End: 2019-10-24
Payer: MEDICARE

## 2019-10-24 DIAGNOSIS — Z23 NEED FOR INFLUENZA VACCINATION: Primary | ICD-10-CM

## 2019-10-24 PROCEDURE — 90662 IIV NO PRSV INCREASED AG IM: CPT | Performed by: INTERNAL MEDICINE

## 2019-10-24 PROCEDURE — G0008 ADMIN INFLUENZA VIRUS VAC: HCPCS | Performed by: INTERNAL MEDICINE

## 2019-11-11 ENCOUNTER — OFFICE VISIT (OUTPATIENT)
Dept: NEUROLOGY | Facility: CLINIC | Age: 79
End: 2019-11-11
Payer: MEDICARE

## 2019-11-11 VITALS
DIASTOLIC BLOOD PRESSURE: 55 MMHG | SYSTOLIC BLOOD PRESSURE: 118 MMHG | HEART RATE: 74 BPM | HEIGHT: 68 IN | WEIGHT: 145 LBS | RESPIRATION RATE: 17 BRPM | BODY MASS INDEX: 21.98 KG/M2

## 2019-11-11 DIAGNOSIS — M51.26 LUMBAR HERNIATED DISC: ICD-10-CM

## 2019-11-11 DIAGNOSIS — M48.061 SPINAL STENOSIS OF LUMBAR REGION WITHOUT NEUROGENIC CLAUDICATION: ICD-10-CM

## 2019-11-11 DIAGNOSIS — M51.9 LUMBAR DISC DISEASE: ICD-10-CM

## 2019-11-11 DIAGNOSIS — M54.16 LUMBAR RADICULOPATHY: Primary | ICD-10-CM

## 2019-11-11 DIAGNOSIS — M48.061 LUMBAR FORAMINAL STENOSIS: ICD-10-CM

## 2019-11-11 DIAGNOSIS — M43.16 SPONDYLOLISTHESIS OF LUMBAR REGION: ICD-10-CM

## 2019-11-11 PROCEDURE — 99214 OFFICE O/P EST MOD 30 MIN: CPT | Performed by: PHYSICAL MEDICINE & REHABILITATION

## 2019-11-11 NOTE — PATIENT INSTRUCTIONS
Plan  He would like to hold on any further injections for now. The patient will continue with his home exercise program.    The patient will continue with their current pain medications. He will follow up in 3 months or sooner if needed.

## 2019-11-11 NOTE — PROGRESS NOTES
Low Back Pain H & P    Chief Complaint: Patient presents with:  Low Back Pain: LOV: 8/5/19, 8/23/19 for Right L3 and L5 TFESI w/ 99% relief. Until 1 week ago. Pt notes some disomfort in Right thigh (rates average pain 1-2/10).  The frequency of discomfort h disease)     EGD 10/30/17 Dr Mery Howard at [facility]--poss short segment barretts--bx however neg. • H/O colonoscopy 6/11/12    cscopy Dr Mery Howard -no recurrent polyps, sigmoid diverticulosis and int hemorrhoids.     • Hearing loss     hearing aides   • History   Family History   Problem Relation Age of Onset   • Cancer Father          age 67 lung cancer   • Stroke Father          age 67   • Breast Cancer Mother 50   • Dementia Mother          age 80 pneumonia   • Other (Other[other]) Mother Relationship status: Not on file      Intimate partner violence:        Fear of current or ex partner: Not on file        Emotionally abused: Not on file        Physically abused: Not on file        Forced sexual activity: Not on file    Other Topics Greater Trochanteric Bursa: Non-tender for bilateral Greater Trochanteric Bursa     Vascular lower extremity:   Dorsalis pedis pulse-RIGHT 2+   Dorsalis pedis pulse-LEFT 2+   Tibialis posterior pulse-RIGHT 2+   Tibialis posterior pulse-LEFT 2+     Neurol

## 2019-12-06 ENCOUNTER — OFFICE VISIT (OUTPATIENT)
Dept: FAMILY MEDICINE CLINIC | Facility: CLINIC | Age: 79
End: 2019-12-06
Payer: MEDICARE

## 2019-12-06 VITALS
BODY MASS INDEX: 22.58 KG/M2 | OXYGEN SATURATION: 99 % | HEIGHT: 68 IN | HEART RATE: 58 BPM | SYSTOLIC BLOOD PRESSURE: 130 MMHG | WEIGHT: 149 LBS | RESPIRATION RATE: 16 BRPM | DIASTOLIC BLOOD PRESSURE: 70 MMHG

## 2019-12-06 DIAGNOSIS — Z71.84 COUNSELING FOR TRAVEL: ICD-10-CM

## 2019-12-06 DIAGNOSIS — Z71.9 ENCOUNTER FOR CONSULTATION: Primary | ICD-10-CM

## 2019-12-06 PROCEDURE — 90471 IMMUNIZATION ADMIN: CPT | Performed by: FAMILY MEDICINE

## 2019-12-06 PROCEDURE — 99214 OFFICE O/P EST MOD 30 MIN: CPT | Performed by: FAMILY MEDICINE

## 2019-12-06 PROCEDURE — 90632 HEPA VACCINE ADULT IM: CPT | Performed by: FAMILY MEDICINE

## 2019-12-06 RX ORDER — ATOVAQUONE AND PROGUANIL HYDROCHLORIDE 250; 100 MG/1; MG/1
TABLET, FILM COATED ORAL
Qty: 9 TABLET | Refills: 0 | Status: SHIPPED
Start: 2019-12-06 | End: 2019-12-06

## 2019-12-06 RX ORDER — SCOLOPAMINE TRANSDERMAL SYSTEM 1 MG/1
1 PATCH, EXTENDED RELEASE TRANSDERMAL
Qty: 10 PATCH | Refills: 0 | Status: SHIPPED | OUTPATIENT
Start: 2019-12-06 | End: 2020-02-24 | Stop reason: ALTCHOICE

## 2019-12-06 RX ORDER — ATOVAQUONE AND PROGUANIL HYDROCHLORIDE 250; 100 MG/1; MG/1
TABLET, FILM COATED ORAL
Qty: 24 TABLET | Refills: 0 | Status: SHIPPED
Start: 2019-12-06 | End: 2019-12-06

## 2019-12-06 RX ORDER — ATOVAQUONE AND PROGUANIL HYDROCHLORIDE 250; 100 MG/1; MG/1
TABLET, FILM COATED ORAL
Qty: 24 TABLET | Refills: 0 | Status: SHIPPED
Start: 2019-12-06 | End: 2020-02-24 | Stop reason: ALTCHOICE

## 2019-12-06 RX ORDER — AZITHROMYCIN 500 MG/1
TABLET, FILM COATED ORAL
Qty: 6 TABLET | Refills: 0 | Status: SHIPPED
Start: 2019-12-06 | End: 2020-02-24 | Stop reason: ALTCHOICE

## 2019-12-06 RX ORDER — AZITHROMYCIN 500 MG/1
TABLET, FILM COATED ORAL
Qty: 6 TABLET | Refills: 0 | Status: SHIPPED
Start: 2019-12-06 | End: 2019-12-06

## 2019-12-06 NOTE — PROGRESS NOTES
HPI:   Patient presents with:  Consult: Travel consult; Colin Laura; [facility] area & [address] 2/1/20-2/15/20 for leisure      Elenor Cowden is a 70year old male who presents [facility]:  Counseling, Advice and Immunizations    · Patient will be travel first day as needed for symptoms of TRAVELERS' DIARRHEA, may repeat one tablet once a day as needed until symptoms of TRAVELERS' DIARRHEA resolve or are significantly improved; take for up to 5 days; usual duration is 1-3 days 6 tablet 0   • Atovaquone-Pro int hemorrhoids.     • Hearing loss     hearing aides   • Herpes labialis    • Hiatal hernia 1999    EGD 1999, 2002, 2007  Dr Kimberlee Lefort    • Hyperlipidemia    • Kidney stones 1980    removed via cystoscopy at [address]   • Lumbar deg quittin.9      Smokeless tobacco: Never Used      Tobacco comment: Pipe    Alcohol use: Yes      Alcohol/week: 14.0 standard drinks      Types: 14 Glasses of wine per week      Comment: 2 glasses wine per day.     Drug use: No        REVIEW OF SYSTEMS: for 2 weeks.  Dates of travel: Departs 2/1-2/15/20  · Purpose of travel/visit is: vacation  · Immunizations given today: Hep A booster  · Immunizations due: none  · Other recommendations today:  · Azithro 500 mg qd x 1-5 days prn for Traveler's Diarrhea, (o until symptoms of TRAVELERS' DIARRHEA resolve or are significantly improved; take for up to 5 days; usual duration is 1-3 days  Dispense: 6 tablet; Refill: 0  - Atovaquone-Proguanil HCl (MALARONE) 250-100 MG Oral Tab; Take one tablet by mouth once a day.  Tanmay Culp

## 2019-12-13 ENCOUNTER — TELEPHONE (OUTPATIENT)
Dept: FAMILY MEDICINE CLINIC | Facility: CLINIC | Age: 79
End: 2019-12-13

## 2020-01-01 ENCOUNTER — EXTERNAL RECORD (OUTPATIENT)
Dept: OTHER | Age: 80
End: 2020-01-01

## 2020-01-14 ENCOUNTER — TELEPHONE (OUTPATIENT)
Dept: FAMILY MEDICINE CLINIC | Facility: CLINIC | Age: 80
End: 2020-01-14

## 2020-01-14 ENCOUNTER — TELEPHONE (OUTPATIENT)
Dept: INTERNAL MEDICINE CLINIC | Facility: CLINIC | Age: 80
End: 2020-01-14

## 2020-01-14 NOTE — TELEPHONE ENCOUNTER
PT traveling to [facility] on 2. 1.2020 for two weeks and has 3 questions-     1-In addition to the shots they have already received from Dr. Radha Leal, PT has question about getting the yellow fever shot because they are traveling through [place] airport.  Do they

## 2020-01-14 NOTE — TELEPHONE ENCOUNTER
To nursing,   I am not familiar with any type of mask for smog. He could check with the [facility] travel clinic or the [place] travel clinic. Thanks.

## 2020-01-14 NOTE — TELEPHONE ENCOUNTER
Pt will be going to [facility] in 2 weeks, [facility] is experiencing a lot of smog  Pt is asking if Dr Kirk Lim recommends a certain type of face mask, or is there somewhere he can call to find that out?   Please call pt [phone]   Tasked to [employer]

## 2020-01-15 ENCOUNTER — OFFICE VISIT (OUTPATIENT)
Dept: NEUROLOGY | Facility: CLINIC | Age: 80
End: 2020-01-15
Payer: MEDICARE

## 2020-01-15 VITALS
SYSTOLIC BLOOD PRESSURE: 128 MMHG | BODY MASS INDEX: 22.58 KG/M2 | WEIGHT: 149 LBS | RESPIRATION RATE: 17 BRPM | DIASTOLIC BLOOD PRESSURE: 62 MMHG | HEART RATE: 68 BPM | HEIGHT: 68 IN

## 2020-01-15 DIAGNOSIS — M51.26 LUMBAR HERNIATED DISC: ICD-10-CM

## 2020-01-15 DIAGNOSIS — M54.16 LUMBAR RADICULOPATHY: Primary | ICD-10-CM

## 2020-01-15 DIAGNOSIS — M43.16 SPONDYLOLISTHESIS OF LUMBAR REGION: ICD-10-CM

## 2020-01-15 DIAGNOSIS — M48.061 LUMBAR FORAMINAL STENOSIS: ICD-10-CM

## 2020-01-15 DIAGNOSIS — M48.061 SPINAL STENOSIS OF LUMBAR REGION WITHOUT NEUROGENIC CLAUDICATION: ICD-10-CM

## 2020-01-15 DIAGNOSIS — M51.9 LUMBAR DISC DISEASE: ICD-10-CM

## 2020-01-15 PROCEDURE — 99214 OFFICE O/P EST MOD 30 MIN: CPT | Performed by: PHYSICAL MEDICINE & REHABILITATION

## 2020-01-15 NOTE — PROGRESS NOTES
Low Back Pain H & P    Chief Complaint: Patient presents with:  Low Back Pain: LOV: 11/11/19 - Pt reports no LBP currently, had 1-3/10 pain over the holiday season which came/went. Pt is unsure what triggers pain/discomfort down Right thigh.  Used Advil and Dr. Omaira Altamirano and has seen Dr. Shravan Herrera neurosurg at [address] [place] [address].     • Lumbar herniated disc 1976    was hosp in traction and had back brace in 1976 after hurt back taboganning   • Ocular hypertension of right eye 3/2016    taking Latanoprost OU qhs    • Pi Social History   Social History    Socioeconomic History      Marital status:       Spouse name: Not on file      Number of children: Not on file      Years of education: Not on file      Highest education level: Not on file    Occupational Hi Weight Concern: Not Asked        Special Diet: Not Asked        Back Care: Not Asked        Exercise: Yes          walking, golfing        Bike Helmet: Not Asked        Seat Belt: Not Asked        Self-Exams: Not Asked    Social History Narrative bilateral Lower Extremities   LE Muscle Strength:  All LE strength measurements 5/5 except:  Hamstring RIGHT:   4+/5  Hip Flexion RIGHT:  4+/5  Extensor Hallucis Longus RIGHT:   4+/5   RIGHT plantar reflexes: downward response   LEFT plantar reflexes: downw

## 2020-01-15 NOTE — PATIENT INSTRUCTIONS
Plan  The patient will continue with his home exercise program.    He does not need any injection at this time. He will continue with the baclofen as needed. He will call if he has a flare up and needs to take a Medrol Dosepak.     He will follow up

## 2020-01-17 NOTE — TELEPHONE ENCOUNTER
No need for YF for [facility], he can bring masks like we have in our office just in case but most people don't use them but it's a good idea, ask Tianna Kennedy about the form/appeal-he may need to go through our billing dept

## 2020-01-17 NOTE — TELEPHONE ENCOUNTER
Spoke to pt, informed him per Dr. Klein Minors Fever vaccine is not required when travelling through [facility] airport; purchase face masks from [employer] if he chooses to wear them; pt given Billing Dept contact number to ask for appeal fo

## 2020-01-20 NOTE — TELEPHONE ENCOUNTER
Refill for Proctosol HC 2.5% rectal cream  Apply daily prn hemorrhoids. 85.05 g with 3 refills sent to [facility].

## 2020-02-20 ENCOUNTER — APPOINTMENT (OUTPATIENT)
Dept: LAB | Age: 80
End: 2020-02-20
Attending: INTERNAL MEDICINE
Payer: MEDICARE

## 2020-02-20 DIAGNOSIS — E78.00 HYPERCHOLESTEREMIA: ICD-10-CM

## 2020-02-20 DIAGNOSIS — R73.03 PRE-DIABETES: ICD-10-CM

## 2020-02-20 LAB
ALBUMIN SERPL-MCNC: 4 G/DL (ref 3.4–5)
ALBUMIN/GLOB SERPL: 1.2 {RATIO} (ref 1–2)
ALP LIVER SERPL-CCNC: 63 U/L (ref 45–117)
ALT SERPL-CCNC: 24 U/L (ref 16–61)
ANION GAP SERPL CALC-SCNC: 4 MMOL/L (ref 0–18)
AST SERPL-CCNC: 20 U/L (ref 15–37)
BILIRUB SERPL-MCNC: 0.4 MG/DL (ref 0.1–2)
BUN BLD-MCNC: 16 MG/DL (ref 7–18)
BUN/CREAT SERPL: 15.2 (ref 10–20)
CALCIUM BLD-MCNC: 8.9 MG/DL (ref 8.5–10.1)
CHLORIDE SERPL-SCNC: 103 MMOL/L (ref 98–112)
CHOLEST SMN-MCNC: 244 MG/DL (ref ?–200)
CO2 SERPL-SCNC: 30 MMOL/L (ref 21–32)
CREAT BLD-MCNC: 1.05 MG/DL (ref 0.7–1.3)
EST. AVERAGE GLUCOSE BLD GHB EST-MCNC: 126 MG/DL (ref 68–126)
GLOBULIN PLAS-MCNC: 3.3 G/DL (ref 2.8–4.4)
GLUCOSE BLD-MCNC: 100 MG/DL (ref 70–99)
HBA1C MFR BLD HPLC: 6 % (ref ?–5.7)
HDLC SERPL-MCNC: 57 MG/DL (ref 40–59)
LDLC SERPL CALC-MCNC: 166 MG/DL (ref ?–100)
M PROTEIN MFR SERPL ELPH: 7.3 G/DL (ref 6.4–8.2)
NONHDLC SERPL-MCNC: 187 MG/DL (ref ?–130)
OSMOLALITY SERPL CALC.SUM OF ELEC: 285 MOSM/KG (ref 275–295)
PATIENT FASTING Y/N/NP: YES
PATIENT FASTING Y/N/NP: YES
POTASSIUM SERPL-SCNC: 4.5 MMOL/L (ref 3.5–5.1)
SODIUM SERPL-SCNC: 137 MMOL/L (ref 136–145)
TRIGL SERPL-MCNC: 106 MG/DL (ref 30–149)
VLDLC SERPL CALC-MCNC: 21 MG/DL (ref 0–30)

## 2020-02-20 PROCEDURE — 80053 COMPREHEN METABOLIC PANEL: CPT

## 2020-02-20 PROCEDURE — 83036 HEMOGLOBIN GLYCOSYLATED A1C: CPT

## 2020-02-20 PROCEDURE — 36415 COLL VENOUS BLD VENIPUNCTURE: CPT

## 2020-02-20 PROCEDURE — 80061 LIPID PANEL: CPT

## 2020-02-24 ENCOUNTER — OFFICE VISIT (OUTPATIENT)
Dept: INTERNAL MEDICINE CLINIC | Facility: CLINIC | Age: 80
End: 2020-02-24
Payer: MEDICARE

## 2020-02-24 VITALS
WEIGHT: 149 LBS | HEART RATE: 63 BPM | HEIGHT: 68 IN | OXYGEN SATURATION: 98 % | RESPIRATION RATE: 14 BRPM | SYSTOLIC BLOOD PRESSURE: 138 MMHG | DIASTOLIC BLOOD PRESSURE: 64 MMHG | TEMPERATURE: 98 F | BODY MASS INDEX: 22.58 KG/M2

## 2020-02-24 DIAGNOSIS — R73.03 PRE-DIABETES: ICD-10-CM

## 2020-02-24 DIAGNOSIS — Z00.00 MEDICARE ANNUAL WELLNESS VISIT, SUBSEQUENT: Primary | ICD-10-CM

## 2020-02-24 DIAGNOSIS — M48.061 SPINAL STENOSIS OF LUMBAR REGION WITHOUT NEUROGENIC CLAUDICATION: ICD-10-CM

## 2020-02-24 DIAGNOSIS — Z85.46 H/O PROSTATE CANCER: ICD-10-CM

## 2020-02-24 DIAGNOSIS — K21.9 GASTROESOPHAGEAL REFLUX DISEASE WITHOUT ESOPHAGITIS: ICD-10-CM

## 2020-02-24 DIAGNOSIS — H61.23 EXCESSIVE EAR WAX, BILATERAL: ICD-10-CM

## 2020-02-24 DIAGNOSIS — E78.00 HYPERCHOLESTEREMIA: ICD-10-CM

## 2020-02-24 PROCEDURE — G0439 PPPS, SUBSEQ VISIT: HCPCS | Performed by: INTERNAL MEDICINE

## 2020-02-24 NOTE — PROGRESS NOTES
Estelle Daly is a 68year old male who presents for     6 mo check and medicare annual    Feels good. Took trip to [employer] 2019, and recent trip to [facility].      Hx R low back pain w sciatica-doing well.  Last shot--Dr Swain gave R L3 and L5 TFESI patient already was on Latanoprost qhsOU - previously treated for OHT OD   • Primary open angle glaucoma of left eye 3/8/16    3/8/16 started on Latanoprost qhs OU by PPS- due to [address] OU (36/41)and thinning of OCT OS- treating OHT OD and COAG OS   • Prostat systems:  Constitutional:  No fever, loss of appetite or unintentional weight loss  Respiratory: No cough, wheezing or dyspnea  Cardiac: No chest pain or palpitations  Gastrointestinal: No abdominal pain, vomiting, diarrhea or constipation  Genitourinary: Dr Niesha Hendricks ref by Dr. Joanna Segovia. Lenard Asa gave R L3 and L5 TFESIs 8/23/19.       GERD -on OTC prevacid 15 mg daily.     Hx mild anemia--resolved: Hgb 14.2 on 8/9/19--was 13.7 on 8/6/18.  takes MVI w Fe daily.     Dr. Avinash Newell did c-scopy 8/7/17--2 gwyn polyps, Propionate (FLONASE) 50 MCG/ACT Nasal Suspension 2 sprays by Nasal route daily as needed. 3 Bottle 3   • ibuprofen (ADVIL) 200 MG Oral Tab Take  by mouth as needed. • Multiple Vitamins-Minerals (CENTRUM SILVER OR) Take  by mouth daily.      • Lansoprazo hopeless (over the last two weeks)?: Not at all    PHQ-2 SCORE: 0        Advance Directives     Do you have a healthcare power of ?: Yes    Do you have a living will?: No     Hearing Assessment (Required for AWV/SWV)    conversation    [employer]

## 2020-03-03 ENCOUNTER — TELEPHONE (OUTPATIENT)
Dept: NEUROLOGY | Facility: CLINIC | Age: 80
End: 2020-03-03

## 2020-03-03 DIAGNOSIS — M54.16 LUMBAR RADICULOPATHY: Primary | ICD-10-CM

## 2020-03-03 DIAGNOSIS — M43.16 SPONDYLOLISTHESIS OF LUMBAR REGION: ICD-10-CM

## 2020-03-03 DIAGNOSIS — M51.9 LUMBAR DISC DISEASE: ICD-10-CM

## 2020-03-03 DIAGNOSIS — M48.061 SPINAL STENOSIS OF LUMBAR REGION WITHOUT NEUROGENIC CLAUDICATION: ICD-10-CM

## 2020-03-03 DIAGNOSIS — M51.26 LUMBAR HERNIATED DISC: ICD-10-CM

## 2020-03-03 DIAGNOSIS — M48.061 LUMBAR FORAMINAL STENOSIS: ICD-10-CM

## 2020-03-03 NOTE — TELEPHONE ENCOUNTER
Spoke to patient who states he is experiencing more frequent episodes of lower back pain occurring 5/7 days per week that will infrequently radiate down the right thigh. Stretching improves pain. Pain is mild-moderate. Takes Advil prn which is helpful.  Re

## 2020-03-05 NOTE — TELEPHONE ENCOUNTER
Refill request is for a maintenance medication and has met the criteria specified in the Ambulatory Medication Refill Standing Order for eligibility, visits, laboratory, alerts and was sent to the requested pharmacy.
LR

## 2020-03-16 ENCOUNTER — OFFICE VISIT (OUTPATIENT)
Dept: PHYSICAL THERAPY | Facility: HOSPITAL | Age: 80
End: 2020-03-16
Attending: PHYSICAL MEDICINE & REHABILITATION
Payer: MEDICARE

## 2020-03-16 DIAGNOSIS — M51.9 LUMBAR DISC DISEASE: ICD-10-CM

## 2020-03-16 DIAGNOSIS — M48.061 LUMBAR FORAMINAL STENOSIS: ICD-10-CM

## 2020-03-16 DIAGNOSIS — M48.061 SPINAL STENOSIS OF LUMBAR REGION WITHOUT NEUROGENIC CLAUDICATION: ICD-10-CM

## 2020-03-16 DIAGNOSIS — M51.26 LUMBAR HERNIATED DISC: ICD-10-CM

## 2020-03-16 DIAGNOSIS — M54.16 LUMBAR RADICULOPATHY: ICD-10-CM

## 2020-03-16 DIAGNOSIS — M43.16 SPONDYLOLISTHESIS OF LUMBAR REGION: ICD-10-CM

## 2020-03-16 PROCEDURE — 97162 PT EVAL MOD COMPLEX 30 MIN: CPT

## 2020-03-16 PROCEDURE — 97110 THERAPEUTIC EXERCISES: CPT

## 2020-03-16 NOTE — PROGRESS NOTES
LUMBAR SPINE EVALUATION:   Janice Varghese    1/29/1940  Referring Physician:  Live Swain  Diagnosis: Lumbar radiculopathy (M54.16)  Lumbar herniated disc (M51.26)  Lumbar foraminal stenosis (M48.061)  Spinal stenosis of lumbar region without neurog [place] [facility]     • Lumbar herniated disc 1976    was hosp in traction and had back brace in 1976 after hurt back taboganning   • Ocular hypertension of right eye 3/2016    taking Latanoprost OU qhs    • Pinguecula of both eyes 2007   • Prediab years ago   Current functional limitations include twisting, . Prior treatments: medications, SAY, PT  Catia Saunders describes prior level of function able to perform all desired ADL's including playing golf. Pt goals included as physical therapy goals below. sides, Hours of sleep - 8 , Wakes - every two hours for bathroom, Sweats - no  Incontinence: none daytime, may have leak during deep sleep  Saddle Anesthesia: none  GI: none  Stress: good  Work:  retired  Living environment: home w wife  Stairs: flight to foot (S1) wnl   Lateral border of foot (S1) wnl   Popliteal fossa (S2) wnl   Popliteal fossa (S2) wnl       Abdominals 3/16/2020   Tone  good     Diastasis in fingers 3/16/2020   6 cm above umbilicus 1-2   At umbilicus 1-2   6 cm below umbilicus 1-2      L processes of the transitional segment and the sacral ala bilaterally. A rudimentary   disc is appreciated between the transitional body and the remainder of the sacral spine.  For the purposes of this examination, the lowest fully formed disc space is desig flavum and mild prominence of the   dorsal epidural fat.  These findings result in moderate to severe spinal canal stenosis and bilateral subarticular zone impingement of the transiting L4 nerve roots and moderate bilateral foraminal stenoses involving the able to use his HEP to relieve his symptoms. 4. Pt will be able to play 9 holes of golf 2 days/week without increased symptoms. 5. Pt will be able to walk the golf course without increased symptoms.      PLAN OF CARE:      Frequency / Duration: Patient wi

## 2020-03-19 RX ORDER — FLUTICASONE PROPIONATE 50 MCG
SPRAY, SUSPENSION (ML) NASAL
Qty: 48 G | Refills: 3 | Status: SHIPPED | OUTPATIENT
Start: 2020-03-19 | End: 2021-06-30

## 2020-03-26 ENCOUNTER — APPOINTMENT (OUTPATIENT)
Dept: PHYSICAL THERAPY | Facility: HOSPITAL | Age: 80
End: 2020-03-26
Attending: PHYSICAL MEDICINE & REHABILITATION
Payer: MEDICARE

## 2020-03-29 ENCOUNTER — TELEPHONE (OUTPATIENT)
Dept: PHYSICAL THERAPY | Facility: HOSPITAL | Age: 80
End: 2020-03-29

## 2020-03-31 ENCOUNTER — APPOINTMENT (OUTPATIENT)
Dept: PHYSICAL THERAPY | Facility: HOSPITAL | Age: 80
End: 2020-03-31
Attending: PHYSICAL MEDICINE & REHABILITATION
Payer: MEDICARE

## 2020-04-07 ENCOUNTER — APPOINTMENT (OUTPATIENT)
Dept: PHYSICAL THERAPY | Facility: HOSPITAL | Age: 80
End: 2020-04-07
Attending: PHYSICAL MEDICINE & REHABILITATION
Payer: MEDICARE

## 2020-04-07 ENCOUNTER — TELEPHONE (OUTPATIENT)
Dept: PHYSICAL THERAPY | Facility: HOSPITAL | Age: 80
End: 2020-04-07

## 2020-04-14 ENCOUNTER — APPOINTMENT (OUTPATIENT)
Dept: PHYSICAL THERAPY | Facility: HOSPITAL | Age: 80
End: 2020-04-14
Attending: PHYSICAL MEDICINE & REHABILITATION
Payer: MEDICARE

## 2020-04-21 ENCOUNTER — APPOINTMENT (OUTPATIENT)
Dept: PHYSICAL THERAPY | Facility: HOSPITAL | Age: 80
End: 2020-04-21
Attending: PHYSICAL MEDICINE & REHABILITATION
Payer: MEDICARE

## 2020-04-28 ENCOUNTER — APPOINTMENT (OUTPATIENT)
Dept: PHYSICAL THERAPY | Facility: HOSPITAL | Age: 80
End: 2020-04-28
Attending: PHYSICAL MEDICINE & REHABILITATION
Payer: MEDICARE

## 2020-04-29 ENCOUNTER — TELEPHONE (OUTPATIENT)
Dept: PHYSICAL THERAPY | Facility: HOSPITAL | Age: 80
End: 2020-04-29

## 2020-04-29 NOTE — TELEPHONE ENCOUNTER
Left message for pt to call back regarding her upcoming PT appointments.   Advised that Per [employer] [place]'s extended shelter in place we are seeing patients on a limit basis as some concessions are being made for essential care and also with extra cauti

## 2020-05-05 ENCOUNTER — APPOINTMENT (OUTPATIENT)
Dept: PHYSICAL THERAPY | Facility: HOSPITAL | Age: 80
End: 2020-05-05
Attending: PHYSICAL MEDICINE & REHABILITATION
Payer: MEDICARE

## 2020-05-12 ENCOUNTER — APPOINTMENT (OUTPATIENT)
Dept: PHYSICAL THERAPY | Facility: HOSPITAL | Age: 80
End: 2020-05-12
Attending: PHYSICAL MEDICINE & REHABILITATION
Payer: MEDICARE

## 2020-05-18 ENCOUNTER — TELEPHONE (OUTPATIENT)
Dept: PHYSICAL THERAPY | Facility: HOSPITAL | Age: 80
End: 2020-05-18

## 2020-05-19 ENCOUNTER — APPOINTMENT (OUTPATIENT)
Dept: PHYSICAL THERAPY | Facility: HOSPITAL | Age: 80
End: 2020-05-19
Attending: PHYSICAL MEDICINE & REHABILITATION
Payer: MEDICARE

## 2020-05-26 ENCOUNTER — OFFICE VISIT (OUTPATIENT)
Dept: PHYSICAL THERAPY | Facility: HOSPITAL | Age: 80
End: 2020-05-26
Attending: PHYSICAL MEDICINE & REHABILITATION
Payer: MEDICARE

## 2020-05-26 PROCEDURE — 97110 THERAPEUTIC EXERCISES: CPT

## 2020-05-26 NOTE — PROGRESS NOTES
Vimal Blue    1/29/1940  Referring Physician:  Darline Elliott  Diagnosis: Lumbar radiculopathy (M54.16)  Lumbar herniated disc (M51.26)  Lumbar foraminal stenosis (M48.061)  Spinal stenosis of lumbar region without neurogenic claudication (M48.061) verbalized understanding and demonstrated competence. All exercises done B unless otherwise indicated. Pt advised to discontinue exercises that increase pain and to call or return to therapist to discuss.         ASSESSMENT:     Pt performs his HEP consis Devices: none       Assistance: none  Pelvis: L post ileum   Gillet Test: L (+)  Standing Forward Bend Test: L (+)    Sensation - Dermatomes 3/16/2020   Light Touch    Proximal medial thigh (L2)  wnl   Proximal medial thigh (L2) wnl   Above knee (L3) wnl sciatica. Radicular symptomatology radiating to the right lower leg. No trauma. (M51.26, M51.36)     TECHNIQUE:    A variety of imaging planes and parameters were utilized for visualization of suspected pathology.      FINDINGS:          NUMERATION: Partial buckling of the ligamentum flavum. These findings cause moderate spinal canal narrowing bilateral subarticular zone encroachment with moderate to severe   bilateral foraminal impingement.   L3-L4:   A diffuse disc bulge is demonstrated at superimposed broad Cholelithiasis or debris within the gallbladder. 5. Lesser incidental findings as above.

## 2020-06-01 ENCOUNTER — APPOINTMENT (OUTPATIENT)
Dept: LAB | Age: 80
End: 2020-06-01
Attending: INTERNAL MEDICINE
Payer: MEDICARE

## 2020-06-01 DIAGNOSIS — E78.00 HYPERCHOLESTEREMIA: ICD-10-CM

## 2020-06-01 PROCEDURE — 36415 COLL VENOUS BLD VENIPUNCTURE: CPT

## 2020-06-01 PROCEDURE — 80061 LIPID PANEL: CPT

## 2020-06-01 NOTE — ASSESSMENT & PLAN NOTE
Intraocular pressure stable, tolerating medications. Will continue same regimen of Latanoprost in both eyes at bedtime. Will have patient back in 4 months for a pressure check. Yes

## 2020-06-02 ENCOUNTER — OFFICE VISIT (OUTPATIENT)
Dept: PHYSICAL THERAPY | Facility: HOSPITAL | Age: 80
End: 2020-06-02
Attending: PHYSICAL MEDICINE & REHABILITATION
Payer: MEDICARE

## 2020-06-02 ENCOUNTER — TELEPHONE (OUTPATIENT)
Dept: INTERNAL MEDICINE CLINIC | Facility: CLINIC | Age: 80
End: 2020-06-02

## 2020-06-02 PROCEDURE — 97110 THERAPEUTIC EXERCISES: CPT

## 2020-06-02 NOTE — PROGRESS NOTES
Alexa Ibarra    1/29/1940  Referring Physician:  Kera Velez  Diagnosis: Lumbar radiculopathy (M54.16)  Lumbar herniated disc (M51.26)  Lumbar foraminal stenosis (M48.061)  Spinal stenosis of lumbar region without neurogenic claudication (M48.061) shoulder scaption    X GTB    Functional squat    X 10x3    Functional squats w weights    X 5# 10x2   H = HEP. Pt given copies of this exercise for home program.  X = Exercises done this date - pt verbalized understanding and demonstrated competence.  All forward head position, flattened lumbar spine. Palpation: no tenderness to palpation in lumbar central spine or ps  Body Mechanics: fair demonstrated while reaching for personal belongings.   Gait:        Deviations: none       Devices: none       [place] COMPARISON: [facility], X LUMBAR SPINE AP LAT FL EX, 5/12/2014, 15:08. [address] [facility], 5/23/2013, 18:56. INDICATIONS:  Chronic lower back pain with right-sided sciatica.  Radicular sy LUMBAR DISC LEVELS:  L1-L2:   Suboptimally assessed and not entirely included in the axial images. A tiny annular fissure is suggested. L2-L3:   A diffuse disc bulge is apparent.  There is mild hypertrophic facet arthropathy with slight buckling of the l stenosis at L4-L5. 2. Multilevel subarticular zone and foraminal encroachment, as above described, with asymmetric severe foraminal impingement at L4-L5 and L5-S1.     3. Transitional lumbosacral anatomy is present.       4. Cholelithiasis or debris wit

## 2020-06-02 NOTE — TELEPHONE ENCOUNTER
To nursing, please tell pt lab done on 6/1/20--lipid panel --results are normal.   LDL cholesterol is good at 81. Continue rosuvastatin 20 mg daily. Thanks.

## 2020-06-05 ENCOUNTER — NURSE ONLY (OUTPATIENT)
Dept: FAMILY MEDICINE CLINIC | Facility: CLINIC | Age: 80
End: 2020-06-05
Payer: MEDICARE

## 2020-06-05 DIAGNOSIS — Z23 NEED FOR HEPATITIS A IMMUNIZATION: Primary | ICD-10-CM

## 2020-06-05 PROCEDURE — 90471 IMMUNIZATION ADMIN: CPT | Performed by: FAMILY MEDICINE

## 2020-06-05 PROCEDURE — 90632 HEPA VACCINE ADULT IM: CPT | Performed by: FAMILY MEDICINE

## 2020-06-09 ENCOUNTER — APPOINTMENT (OUTPATIENT)
Dept: PHYSICAL THERAPY | Facility: HOSPITAL | Age: 80
End: 2020-06-09
Attending: PHYSICAL MEDICINE & REHABILITATION
Payer: MEDICARE

## 2020-06-16 ENCOUNTER — OFFICE VISIT (OUTPATIENT)
Dept: PHYSICAL THERAPY | Facility: HOSPITAL | Age: 80
End: 2020-06-16
Attending: PHYSICAL MEDICINE & REHABILITATION
Payer: MEDICARE

## 2020-06-16 PROCEDURE — 97110 THERAPEUTIC EXERCISES: CPT

## 2020-06-16 NOTE — PROGRESS NOTES
Discharge Summary    Pt has attended 4, cancelled 0, and no shown 0 visits in Physical Therapy.      Alexa Ibarra    1/29/1940  Referring Physician:  Dr. Becki Go  Diagnosis: Lumbar radiculopathy (M54.16)  Lumbar herniated disc (M51.26)  Lumbar for decrease of pain complaints. Pt demonstrates improved function as noted as progression towards goals and improved FOTO score. See objective discharge data below in tables with initial evaluation data.  Pt has met goals, is independent with HEP and ready to mechanics awareness for prevention of reinjury. MET  3. Pt will be able to use his HEP to relieve his symptoms. MET  4. Pt will be able to play 9 holes of golf 2 days/week without increased symptoms. MET  5.  Pt will be able to walk the golf course without only 45      ELISSA 3/16/2020   R Min-mod   L Mod         ROM Lumbar 3/16/2020 6/16/2020    Flexion Min, stiff wnl   Extension Mod-max Min-mod   R SB max min   L SB Mod-max wnl   R Rotation Min-mod Min (-)   L Rotation Min-mod Min (+)   * pain limiting    R transitional body is designated S1 with the rudimentary disc   designated S1-S2. Images have been extensively annotated to reflect the numeration scheme. ALIGNMENT:    Mild dextroscoliosis is noted.  There is trace interruption of the expected lumbar lordo roots. L4-L5:   A diffuse disc bulge is manifested with a small broad-based central protrusion and right greater than left foraminal protrusion. Hypertrophic facet arthropathy is manifested with associated buckling of ligamentum flavum.  These findings res

## 2020-06-23 ENCOUNTER — APPOINTMENT (OUTPATIENT)
Dept: PHYSICAL THERAPY | Facility: HOSPITAL | Age: 80
End: 2020-06-23
Attending: PHYSICAL MEDICINE & REHABILITATION
Payer: MEDICARE

## 2020-07-07 ENCOUNTER — APPOINTMENT (OUTPATIENT)
Dept: PHYSICAL THERAPY | Facility: HOSPITAL | Age: 80
End: 2020-07-07
Attending: PHYSICAL MEDICINE & REHABILITATION
Payer: MEDICARE

## 2020-07-07 ENCOUNTER — HOSPITAL ENCOUNTER (OUTPATIENT)
Dept: CT IMAGING | Age: 80
Discharge: HOME | End: 2020-07-07
Attending: INTERNAL MEDICINE

## 2020-07-07 DIAGNOSIS — Z13.6 SCREENING FOR CARDIOVASCULAR CONDITION: ICD-10-CM

## 2020-07-07 NOTE — TELEPHONE ENCOUNTER
Medication request:    Drug  Baclofen  10 mg take 1 tab TID PRN   90 tabs no refills    Last refill  8/26/2019    Last office visit 1/15/2020    Next appointment none    [place]

## 2020-07-08 ENCOUNTER — TELEPHONE (OUTPATIENT)
Dept: INTERNAL MEDICINE CLINIC | Facility: CLINIC | Age: 80
End: 2020-07-08

## 2020-07-08 NOTE — TELEPHONE ENCOUNTER
To nursing, please tell patient calcium heart score test done 7/8/20 shows an elevated calcium score of 277 that can be associated with heart artery plaque or narrowing.   I recommend he see me in the office in the next week–we will do EKG then and give fur

## 2020-07-09 RX ORDER — BACLOFEN 10 MG/1
TABLET ORAL
Qty: 90 TABLET | Refills: 0 | Status: SHIPPED | OUTPATIENT
Start: 2020-07-09 | End: 2020-07-15

## 2020-07-09 RX ORDER — BACLOFEN 10 MG/1
10 TABLET ORAL 3 TIMES DAILY PRN
Qty: 90 TABLET | Refills: 2 | Status: SHIPPED | OUTPATIENT
Start: 2020-07-09

## 2020-07-09 NOTE — TELEPHONE ENCOUNTER
Medication request: Baclofen 10mg Oral Tab, #90, no refills  Take 1 tablet by mouth TID PRN    LOV: 1/15/20  NOV: Not yet scheduled

## 2020-07-15 ENCOUNTER — OFFICE VISIT (OUTPATIENT)
Dept: INTERNAL MEDICINE CLINIC | Facility: CLINIC | Age: 80
End: 2020-07-15
Payer: MEDICARE

## 2020-07-15 VITALS
HEIGHT: 68 IN | WEIGHT: 152 LBS | SYSTOLIC BLOOD PRESSURE: 130 MMHG | HEART RATE: 68 BPM | TEMPERATURE: 98 F | BODY MASS INDEX: 23.04 KG/M2 | DIASTOLIC BLOOD PRESSURE: 64 MMHG

## 2020-07-15 DIAGNOSIS — R73.03 PRE-DIABETES: ICD-10-CM

## 2020-07-15 DIAGNOSIS — E78.00 HYPERCHOLESTEROLEMIA: ICD-10-CM

## 2020-07-15 DIAGNOSIS — R93.1 ABNORMAL HEART SCORE CT: Primary | ICD-10-CM

## 2020-07-15 DIAGNOSIS — I25.10 ATHEROSCLEROSIS OF NATIVE CORONARY ARTERY OF NATIVE HEART WITHOUT ANGINA PECTORIS: ICD-10-CM

## 2020-07-15 DIAGNOSIS — Z01.812 PRE-PROCEDURE LAB EXAM: ICD-10-CM

## 2020-07-15 PROCEDURE — 99214 OFFICE O/P EST MOD 30 MIN: CPT | Performed by: INTERNAL MEDICINE

## 2020-07-15 PROCEDURE — 93010 ELECTROCARDIOGRAM REPORT: CPT | Performed by: INTERNAL MEDICINE

## 2020-07-15 PROCEDURE — 93005 ELECTROCARDIOGRAM TRACING: CPT | Performed by: INTERNAL MEDICINE

## 2020-07-15 PROCEDURE — G0463 HOSPITAL OUTPT CLINIC VISIT: HCPCS | Performed by: INTERNAL MEDICINE

## 2020-07-15 NOTE — PROGRESS NOTES
Bhanu Olivo is a 68year old male who presents for     Here for abnl heart score test  On 7/8/20–calcium score 277 of which 217 is in RCA. Over read okay. No chest pain or shortness of breath. Walks [address] steps per day. Golfed today.   His matern Latanoprost qhs OU by PPS- due to [address] OU (36/41)and thinning of OCT OS- treating OHT OD and COAG OS   • Prostate cancer ([address] 2000    prostatectomy at [facility]   • Skin lesion of face 2015    sees derm Dr. Florentin Mitchell in [facility]   • Superficial palpitations        EXAM:   /64   Pulse 68   Temp 97.9 °F (36.6 °C) (Oral)   Ht 5' 8\" (1.727 m)   Wt 152 lb (68.9 kg)   BMI 23.11 kg/m²      Wt Readings from Last 6 Encounters:  07/15/20 : 152 lb (68.9 kg)  02/24/20 : 149 lb (67.6 kg)  01/15/20 : 14 takes MVI w Fe daily.     Dr. Mery Howard did c-scopy 8/7/17--2 gwyn polyps, int hem and diverticulosis. EGD 10/30/17 Dr Mery Howard at [facility]--poss short segment barretts--bx however neg.      Hx prostate cancer-S/p prostatectomy 2000.  PSA <0.01 on 8/9/19 OF COLD SORE AND  REPEAT DOSE ONCE WITH 2  TABLETS IN 12 HOURS 8 tablet 1   • latanoprost 0.005 % Ophthalmic Solution INSTILL ONE DROP INTO BOTH  EYES EVERY NIGHT 3 Bottle 3   • ibuprofen (ADVIL) 200 MG Oral Tab Take  by mouth as needed.      • Multiple Vit

## 2020-07-16 RX ORDER — ROSUVASTATIN CALCIUM 20 MG/1
TABLET, COATED ORAL
Qty: 90 TABLET | Refills: 3 | Status: ON HOLD | OUTPATIENT
Start: 2020-07-16 | End: 2020-08-04

## 2020-07-21 ENCOUNTER — APPOINTMENT (OUTPATIENT)
Dept: PHYSICAL THERAPY | Facility: HOSPITAL | Age: 80
End: 2020-07-21
Attending: PHYSICAL MEDICINE & REHABILITATION
Payer: MEDICARE

## 2020-07-23 PROBLEM — M17.11 PRIMARY OSTEOARTHRITIS OF RIGHT KNEE: Status: ACTIVE | Noted: 2020-07-23

## 2020-07-23 PROBLEM — M23.91 INTERNAL DERANGEMENT OF RIGHT KNEE: Status: ACTIVE | Noted: 2020-07-23

## 2020-07-24 ENCOUNTER — LAB ENCOUNTER (OUTPATIENT)
Dept: LAB | Facility: HOSPITAL | Age: 80
End: 2020-07-24
Attending: INTERNAL MEDICINE
Payer: MEDICARE

## 2020-07-24 DIAGNOSIS — Z01.812 PRE-PROCEDURE LAB EXAM: ICD-10-CM

## 2020-07-25 LAB — SARS-COV-2 RNA RESP QL NAA+PROBE: NOT DETECTED

## 2020-07-27 ENCOUNTER — TELEPHONE (OUTPATIENT)
Dept: INTERNAL MEDICINE CLINIC | Facility: CLINIC | Age: 80
End: 2020-07-27

## 2020-07-27 ENCOUNTER — HOSPITAL ENCOUNTER (OUTPATIENT)
Dept: CV DIAGNOSTICS | Facility: HOSPITAL | Age: 80
Discharge: HOME | End: 2020-07-27
Attending: INTERNAL MEDICINE
Payer: MEDICARE

## 2020-07-27 DIAGNOSIS — R93.1 ABNORMAL HEART SCORE CT: ICD-10-CM

## 2020-07-27 DIAGNOSIS — I25.10 ATHEROSCLEROSIS OF NATIVE CORONARY ARTERY OF NATIVE HEART WITHOUT ANGINA PECTORIS: ICD-10-CM

## 2020-07-27 PROCEDURE — 93017 CV STRESS TEST TRACING ONLY: CPT | Performed by: INTERNAL MEDICINE

## 2020-07-27 PROCEDURE — 99203 OFFICE O/P NEW LOW 30 MIN: CPT | Performed by: INTERNAL MEDICINE

## 2020-07-27 PROCEDURE — 93350 STRESS TTE ONLY: CPT | Performed by: INTERNAL MEDICINE

## 2020-07-27 PROCEDURE — 93016 CV STRESS TEST SUPVJ ONLY: CPT | Performed by: INTERNAL MEDICINE

## 2020-07-27 PROCEDURE — 93018 CV STRESS TEST I&R ONLY: CPT | Performed by: INTERNAL MEDICINE

## 2020-07-27 NOTE — TELEPHONE ENCOUNTER
To nursing, please tell patient I received his stress echo result of today 7/27/20 with some abnormalities found.   I am aware that he was seen by the cardiologist, Dr Dottie Mazariegos who will do an angiogram.   By the way, his COVID test done in preparation for the s

## 2020-07-27 NOTE — IMAGING NOTE
Patient is here for outpt stress echo   Dx: abnormal calcium score result  Denies any chest discomfort before ,during or after stress test.ECG changes noted  Dr Norberto Rincon cardiologist go to notified and spoke to patient regarding recommendation for cardiac izzy

## 2020-07-27 NOTE — TELEPHONE ENCOUNTER
Staff message received from Dr. Carli Ramirez:    Mr Willi Lopez, despite being functional and with a calcium score of 270, had a very abnormal stress echo.  2-3 mm ST depressions with exercise and multiple wall motion abnormalities.      I recommended angiogram to him

## 2020-07-27 NOTE — TELEPHONE ENCOUNTER
Patient had annual EKGs from his previous doctor - Dr Hayes Neither   Were these records received from Dr Keshia Lucas office when pt transferred to Dr Bev Waterman ?   Please advise [phone]

## 2020-07-27 NOTE — TELEPHONE ENCOUNTER
Call from Kim in cardiac diagnostics--  Stress echo done today is abnormal:  ST deviation of 2mm, multiple wall motion abnormalities suggestive of  multivessel disease.    Kim notes she contacted Dr Lucina Lu with results, he saw pt and pt is set up

## 2020-07-27 NOTE — CONSULTS
[facility] AND [facility]  [facility] Cardiology Consult Note    Si Friends Patient Status:  Outpatient    1940 MRN [id number]   Location [address] [place] [address],[address] Attending Adriano Aguero MD   Hosp Day # 0 PCP Darleen Gill MD     67 yo has seen Dr. Jennifer Bui and [place] [facility]     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 His smoking use included pipe. He smoked 0.00 packs per day for 25.00 years. He has never used smokeless tobacco. He reports current alcohol use of about 14.0 standard drinks of alcohol per week. He reports that he does not use drugs.     Omkar Whelan

## 2020-07-28 ENCOUNTER — TELEPHONE (OUTPATIENT)
Dept: CARDIOLOGY | Age: 80
End: 2020-07-28

## 2020-07-28 DIAGNOSIS — R94.39 ABNORMAL STRESS TEST: Primary | ICD-10-CM

## 2020-07-28 DIAGNOSIS — E78.5 HYPERLIPIDEMIA, UNSPECIFIED HYPERLIPIDEMIA TYPE: ICD-10-CM

## 2020-07-28 DIAGNOSIS — Z01.812 PRE-OPERATIVE LABORATORY EXAMINATION: ICD-10-CM

## 2020-07-28 DIAGNOSIS — Z01.810 PRE-OPERATIVE CARDIOVASCULAR EXAMINATION: ICD-10-CM

## 2020-07-28 DIAGNOSIS — R94.31 ABNORMAL ELECTROCARDIOGRAM (ECG) (EKG): ICD-10-CM

## 2020-07-28 PROBLEM — E78.49 OTHER HYPERLIPIDEMIA: Status: ACTIVE | Noted: 2020-07-28

## 2020-07-28 RX ORDER — BACLOFEN 10 MG/1
10 TABLET ORAL 3 TIMES DAILY PRN
COMMUNITY

## 2020-07-28 RX ORDER — FLUTICASONE PROPIONATE 50 MCG
2 SPRAY, SUSPENSION (ML) NASAL DAILY PRN
COMMUNITY

## 2020-07-28 RX ORDER — ROSUVASTATIN CALCIUM 20 MG/1
20 TABLET, COATED ORAL DAILY
COMMUNITY
End: 2020-08-10 | Stop reason: SDUPTHER

## 2020-07-28 RX ORDER — VALACYCLOVIR HYDROCHLORIDE 1 G/1
2 TABLET, FILM COATED ORAL 2 TIMES DAILY
COMMUNITY

## 2020-07-28 RX ORDER — HYDROCORTISONE 25 MG/G
1 CREAM TOPICAL DAILY PRN
COMMUNITY

## 2020-07-28 RX ORDER — LATANOPROST 50 UG/ML
1 SOLUTION/ DROPS OPHTHALMIC NIGHTLY
COMMUNITY

## 2020-07-28 NOTE — TELEPHONE ENCOUNTER
To nursing, please tell patient there is a normal EKG done 2/7/14 (scanned in the chart under media tab 2/12/14). He can have a copy of it if he would like. Thanks.

## 2020-07-28 NOTE — TELEPHONE ENCOUNTER
Spoke to patient and relayed MD message. Patient verbalized understanding. Pt declined need to have a copy for EKG at this time.

## 2020-08-01 ENCOUNTER — LAB ENCOUNTER (OUTPATIENT)
Dept: LAB | Facility: HOSPITAL | Age: 80
End: 2020-08-01
Attending: INTERNAL MEDICINE
Payer: MEDICARE

## 2020-08-01 ENCOUNTER — HOSPITAL ENCOUNTER (OUTPATIENT)
Dept: GENERAL RADIOLOGY | Facility: HOSPITAL | Age: 80
Discharge: HOME | End: 2020-08-01
Attending: INTERNAL MEDICINE
Payer: MEDICARE

## 2020-08-01 DIAGNOSIS — Z01.812 PRE-OPERATIVE LABORATORY EXAMINATION: Primary | ICD-10-CM

## 2020-08-01 DIAGNOSIS — Z01.818 PRE-OP EVALUATION: ICD-10-CM

## 2020-08-01 LAB
ANION GAP SERPL CALC-SCNC: 2 MMOL/L
ANION GAP SERPL CALC-SCNC: 2 MMOL/L (ref 0–18)
BASOPHILS # BLD AUTO: 0.02 X10(3) UL (ref 0–0.2)
BASOPHILS NFR BLD AUTO: 0.2 %
BUN BLD-MCNC: 15 MG/DL (ref 7–18)
BUN SERPL-MCNC: 15 MG/DL
BUN/CREAT SERPL: 14.7
BUN/CREAT SERPL: 14.7 (ref 10–20)
CALCIUM BLD-MCNC: 8.3 MG/DL (ref 8.5–10.1)
CALCIUM SERPL-MCNC: 8.3 MG/DL
CHLORIDE SERPL-SCNC: 105 MMOL/L
CHLORIDE SERPL-SCNC: 105 MMOL/L (ref 98–112)
CO2 SERPL-SCNC: 30 MMOL/L
CO2 SERPL-SCNC: 30 MMOL/L (ref 21–32)
CREAT BLD-MCNC: 1.02 MG/DL (ref 0.7–1.3)
CREAT SERPL-MCNC: 1.02 MG/DL
DEPRECATED RDW RBC AUTO: 42.5 FL (ref 35.1–46.3)
EOSINOPHIL # BLD AUTO: 0.06 X10(3) UL (ref 0–0.7)
EOSINOPHIL NFR BLD AUTO: 0.7 %
ERYTHROCYTE [DISTWIDTH] IN BLOOD BY AUTOMATED COUNT: 12.9 % (ref 11–15)
ERYTHROCYTE [DISTWIDTH] IN BLOOD BY AUTOMATED COUNT: 42.5 %
GLUCOSE BLD-MCNC: 108 MG/DL (ref 70–99)
GLUCOSE SERPL-MCNC: 108 MG/DL
HCT VFR BLD AUTO: 39.8 % (ref 39–53)
HCT VFR BLD CALC: 39.8 %
HGB BLD-MCNC: 13.3 G/DL
HGB BLD-MCNC: 13.3 G/DL (ref 13–17.5)
IMM GRANULOCYTES # BLD AUTO: 0.02 X10(3) UL (ref 0–1)
IMM GRANULOCYTES NFR BLD: 0.2 %
LENGTH OF FAST TIME PATIENT: NO H
LYMPHOCYTES # BLD AUTO: 2.45 X10(3) UL (ref 1–4)
LYMPHOCYTES NFR BLD AUTO: 28.1 %
MCH RBC QN AUTO: 30 PG
MCH RBC QN AUTO: 30 PG (ref 26–34)
MCHC RBC AUTO-ENTMCNC: 33.4 G/DL
MCHC RBC AUTO-ENTMCNC: 33.4 G/DL (ref 31–37)
MCV RBC AUTO: 89.6 FL
MCV RBC AUTO: 89.6 FL (ref 80–100)
MONOCYTES # BLD AUTO: 0.68 X10(3) UL (ref 0.1–1)
MONOCYTES NFR BLD AUTO: 7.8 %
NEUTROPHILS # BLD AUTO: 5.49 X10 (3) UL (ref 1.5–7.7)
NEUTROPHILS # BLD AUTO: 5.49 X10(3) UL (ref 1.5–7.7)
NEUTROPHILS NFR BLD AUTO: 63 %
OSMOLALITY SERPL CALC.SUM OF ELEC: 285 MOSM/KG (ref 275–295)
PATIENT FASTING Y/N/NP: NO
PLATELET # BLD AUTO: 233 10(3)UL (ref 150–450)
PLATELET # BLD: 233 K/MCL
POTASSIUM SERPL-SCNC: 4.5 MMOL/L
POTASSIUM SERPL-SCNC: 4.5 MMOL/L (ref 3.5–5.1)
RBC # BLD AUTO: 4.44 X10(6)UL (ref 3.8–5.8)
RBC # BLD: 4.44 10*6/UL
SODIUM SERPL-SCNC: 137 MMOL/L
SODIUM SERPL-SCNC: 137 MMOL/L (ref 136–145)
WBC # BLD AUTO: 8.7 X10(3) UL (ref 4–11)
WBC # BLD: 8.7 K/MCL

## 2020-08-01 PROCEDURE — 85025 COMPLETE CBC W/AUTO DIFF WBC: CPT

## 2020-08-01 PROCEDURE — 71046 X-RAY EXAM CHEST 2 VIEWS: CPT | Performed by: INTERNAL MEDICINE

## 2020-08-01 PROCEDURE — 80048 BASIC METABOLIC PNL TOTAL CA: CPT

## 2020-08-01 PROCEDURE — 36415 COLL VENOUS BLD VENIPUNCTURE: CPT

## 2020-08-03 ENCOUNTER — CLINICAL ABSTRACT (OUTPATIENT)
Dept: CARDIOLOGY | Age: 80
End: 2020-08-03

## 2020-08-03 ENCOUNTER — LAB ENCOUNTER (OUTPATIENT)
Dept: LAB | Facility: HOSPITAL | Age: 80
End: 2020-08-03
Attending: INTERNAL MEDICINE
Payer: MEDICARE

## 2020-08-03 DIAGNOSIS — Z01.818 PREOP TESTING: ICD-10-CM

## 2020-08-03 LAB
SARS-COV-2 RNA RESP QL NAA+PROBE: NOT DETECTED
SARS-COV-2 RNA SPEC QL NAA+PROBE: NOT DETECTED
SPECIMEN SOURCE: NORMAL

## 2020-08-03 RX ORDER — ASPIRIN 81 MG/1
81 TABLET, CHEWABLE ORAL DAILY
COMMUNITY

## 2020-08-04 ENCOUNTER — HOSPITAL ENCOUNTER (OUTPATIENT)
Dept: INTERVENTIONAL RADIOLOGY/VASCULAR | Facility: HOSPITAL | Age: 80
Discharge: HOME | End: 2020-08-04
Attending: INTERNAL MEDICINE | Admitting: INTERNAL MEDICINE
Payer: MEDICARE

## 2020-08-04 VITALS
SYSTOLIC BLOOD PRESSURE: 157 MMHG | WEIGHT: 143 LBS | HEART RATE: 56 BPM | RESPIRATION RATE: 15 BRPM | DIASTOLIC BLOOD PRESSURE: 60 MMHG | BODY MASS INDEX: 22 KG/M2 | OXYGEN SATURATION: 98 %

## 2020-08-04 DIAGNOSIS — Z01.818 PREOP TESTING: Primary | ICD-10-CM

## 2020-08-04 DIAGNOSIS — R94.39 ABNORMAL STRESS TEST: ICD-10-CM

## 2020-08-04 PROCEDURE — 93571 IV DOP VEL&/PRESS C FLO 1ST: CPT | Performed by: INTERNAL MEDICINE

## 2020-08-04 PROCEDURE — B2111ZZ FLUOROSCOPY OF MULTIPLE CORONARY ARTERIES USING LOW OSMOLAR CONTRAST: ICD-10-PCS | Performed by: INTERNAL MEDICINE

## 2020-08-04 PROCEDURE — 93571 IV DOP VEL&/PRESS C FLO 1ST: CPT

## 2020-08-04 PROCEDURE — 4A023N7 MEASUREMENT OF CARDIAC SAMPLING AND PRESSURE, LEFT HEART, PERCUTANEOUS APPROACH: ICD-10-PCS | Performed by: INTERNAL MEDICINE

## 2020-08-04 PROCEDURE — 4A033BC MEASUREMENT OF ARTERIAL PRESSURE, CORONARY, PERCUTANEOUS APPROACH: ICD-10-PCS | Performed by: INTERNAL MEDICINE

## 2020-08-04 PROCEDURE — 99152 MOD SED SAME PHYS/QHP 5/>YRS: CPT

## 2020-08-04 PROCEDURE — 99153 MOD SED SAME PHYS/QHP EA: CPT

## 2020-08-04 PROCEDURE — 93454 CORONARY ARTERY ANGIO S&I: CPT | Performed by: INTERNAL MEDICINE

## 2020-08-04 PROCEDURE — 36415 COLL VENOUS BLD VENIPUNCTURE: CPT

## 2020-08-04 PROCEDURE — 93458 L HRT ARTERY/VENTRICLE ANGIO: CPT

## 2020-08-04 PROCEDURE — 99152 MOD SED SAME PHYS/QHP 5/>YRS: CPT | Performed by: INTERNAL MEDICINE

## 2020-08-04 RX ORDER — SODIUM CHLORIDE 9 MG/ML
INJECTION, SOLUTION INTRAVENOUS
Status: COMPLETED | OUTPATIENT
Start: 2020-08-04 | End: 2020-08-04

## 2020-08-04 RX ORDER — LIDOCAINE HYDROCHLORIDE 20 MG/ML
INJECTION, SOLUTION EPIDURAL; INFILTRATION; INTRACAUDAL; PERINEURAL
Status: COMPLETED
Start: 2020-08-04 | End: 2020-08-04

## 2020-08-04 RX ORDER — HEPARIN SODIUM 1000 [USP'U]/ML
INJECTION, SOLUTION INTRAVENOUS; SUBCUTANEOUS
Status: COMPLETED
Start: 2020-08-04 | End: 2020-08-04

## 2020-08-04 RX ORDER — VERAPAMIL HYDROCHLORIDE 2.5 MG/ML
INJECTION, SOLUTION INTRAVENOUS
Status: COMPLETED
Start: 2020-08-04 | End: 2020-08-04

## 2020-08-04 RX ORDER — ROSUVASTATIN CALCIUM 20 MG/1
40 TABLET, COATED ORAL NIGHTLY
Qty: 90 TABLET | Refills: 3 | Status: SHIPPED | OUTPATIENT
Start: 2020-08-04 | End: 2020-08-13

## 2020-08-04 RX ORDER — NITROGLYCERIN 20 MG/100ML
INJECTION INTRAVENOUS
Status: COMPLETED
Start: 2020-08-04 | End: 2020-08-04

## 2020-08-04 RX ORDER — MIDAZOLAM HYDROCHLORIDE 1 MG/ML
INJECTION INTRAMUSCULAR; INTRAVENOUS
Status: COMPLETED
Start: 2020-08-04 | End: 2020-08-04

## 2020-08-04 RX ADMIN — SODIUM CHLORIDE: 9 INJECTION, SOLUTION INTRAVENOUS at 11:00:00

## 2020-08-04 NOTE — INTERVAL H&P NOTE
Pre-op Diagnosis: * No pre-op diagnosis entered *    The above referenced H&P was reviewed by Negin Souza MD on 8/4/2020, the patient was examined and no significant changes have occurred in the patient's condition since the H&P was performed.   I discussed

## 2020-08-04 NOTE — IVS NOTE
Patient is able to sit up and ambulate without difficulty. Patient voided and tolerated food/fluids. VSS. Procedure site remains dry and intact with good circulation, motion, sensation. No signs/symptoms of bleeding noted.    Dr Bert Martin MD spoke with patien

## 2020-08-06 PROBLEM — S83.231A COMPLEX TEAR OF MEDIAL MENISCUS OF RIGHT KNEE AS CURRENT INJURY: Status: ACTIVE | Noted: 2020-08-06

## 2020-08-10 ENCOUNTER — TELEPHONE (OUTPATIENT)
Dept: CARDIOLOGY | Age: 80
End: 2020-08-10

## 2020-08-10 PROBLEM — I25.10 CORONARY ARTERY DISEASE INVOLVING NATIVE CORONARY ARTERY OF NATIVE HEART WITHOUT ANGINA PECTORIS: Status: ACTIVE | Noted: 2020-08-10

## 2020-08-10 RX ORDER — ROSUVASTATIN CALCIUM 40 MG/1
40 TABLET, COATED ORAL DAILY
Qty: 90 TABLET | Refills: 2 | Status: SHIPPED | OUTPATIENT
Start: 2020-08-10 | End: 2020-08-17 | Stop reason: SDUPTHER

## 2020-08-11 ENCOUNTER — OFFICE VISIT (OUTPATIENT)
Dept: CARDIOLOGY | Age: 80
End: 2020-08-11

## 2020-08-11 VITALS
DIASTOLIC BLOOD PRESSURE: 60 MMHG | HEIGHT: 68 IN | BODY MASS INDEX: 22.13 KG/M2 | SYSTOLIC BLOOD PRESSURE: 138 MMHG | WEIGHT: 146 LBS

## 2020-08-11 DIAGNOSIS — E78.49 OTHER HYPERLIPIDEMIA: Primary | ICD-10-CM

## 2020-08-11 DIAGNOSIS — I25.10 CORONARY ARTERY DISEASE INVOLVING NATIVE CORONARY ARTERY OF NATIVE HEART WITHOUT ANGINA PECTORIS: ICD-10-CM

## 2020-08-11 PROCEDURE — 99214 OFFICE O/P EST MOD 30 MIN: CPT | Performed by: NURSE PRACTITIONER

## 2020-08-11 RX ORDER — CEFUROXIME AXETIL 250 MG/1
TABLET ORAL
COMMUNITY
End: 2020-08-11

## 2020-08-11 RX ORDER — ACETAZOLAMIDE 125 MG/1
TABLET ORAL
COMMUNITY
End: 2020-08-11

## 2020-08-11 RX ORDER — ASPIRIN 81 MG/1
324 TABLET, CHEWABLE ORAL DAILY
COMMUNITY
End: 2021-03-15 | Stop reason: CLARIF

## 2020-08-11 RX ORDER — IBUPROFEN 200 MG
TABLET ORAL PRN
COMMUNITY

## 2020-08-11 RX ORDER — AZITHROMYCIN 500 MG/1
TABLET, FILM COATED ORAL
COMMUNITY
End: 2020-08-11

## 2020-08-11 RX ORDER — MECOBALAMIN 5000 MCG
15 TABLET,DISINTEGRATING ORAL DAILY
COMMUNITY
Start: 2014-06-30

## 2020-08-11 RX ORDER — DIFLUPREDNATE OPHTHALMIC 0.5 MG/ML
EMULSION OPHTHALMIC
COMMUNITY
End: 2020-08-11

## 2020-08-11 SDOH — HEALTH STABILITY: MENTAL HEALTH: HOW OFTEN DO YOU HAVE A DRINK CONTAINING ALCOHOL?: MONTHLY OR LESS

## 2020-08-11 ASSESSMENT — PATIENT HEALTH QUESTIONNAIRE - PHQ9
1. LITTLE INTEREST OR PLEASURE IN DOING THINGS: NOT AT ALL
SUM OF ALL RESPONSES TO PHQ9 QUESTIONS 1 AND 2: 0
CLINICAL INTERPRETATION OF PHQ2 SCORE: NO FURTHER SCREENING NEEDED
SUM OF ALL RESPONSES TO PHQ9 QUESTIONS 1 AND 2: 0
2. FEELING DOWN, DEPRESSED OR HOPELESS: NOT AT ALL
CLINICAL INTERPRETATION OF PHQ9 SCORE: NO FURTHER SCREENING NEEDED

## 2020-08-13 PROBLEM — R94.31 ABNORMAL ELECTROCARDIOGRAM (ECG) (EKG): Status: ACTIVE | Noted: 2020-07-28

## 2020-08-13 PROBLEM — M77.8 LEFT WRIST TENDONITIS: Status: ACTIVE | Noted: 2020-08-13

## 2020-08-13 PROBLEM — M25.532 ACUTE PAIN OF LEFT WRIST: Status: ACTIVE | Noted: 2020-08-13

## 2020-08-13 PROBLEM — M18.11 ARTHRITIS OF CARPOMETACARPAL (CMC) JOINT OF RIGHT THUMB: Status: ACTIVE | Noted: 2020-08-13

## 2020-08-13 PROBLEM — R94.39 ABNORMAL STRESS TEST: Status: ACTIVE | Noted: 2020-07-28

## 2020-08-13 PROBLEM — I25.10 CORONARY ARTERY DISEASE INVOLVING NATIVE CORONARY ARTERY OF NATIVE HEART WITHOUT ANGINA PECTORIS: Status: ACTIVE | Noted: 2020-08-10

## 2020-08-17 RX ORDER — ROSUVASTATIN CALCIUM 40 MG/1
40 TABLET, COATED ORAL DAILY
Qty: 90 TABLET | Refills: 2 | Status: SHIPPED | OUTPATIENT
Start: 2020-08-17 | End: 2021-05-20

## 2020-08-18 PROBLEM — M67.432 GANGLION CYST OF WRIST, LEFT: Status: ACTIVE | Noted: 2020-08-18

## 2020-08-24 ENCOUNTER — HOSPITAL ENCOUNTER (OUTPATIENT)
Dept: ULTRASOUND IMAGING | Facility: HOSPITAL | Age: 80
Discharge: HOME | End: 2020-08-24
Attending: INTERNAL MEDICINE

## 2020-08-24 ENCOUNTER — OFFICE VISIT (OUTPATIENT)
Dept: INTERNAL MEDICINE CLINIC | Facility: CLINIC | Age: 80
End: 2020-08-24
Payer: MEDICARE

## 2020-08-24 VITALS
WEIGHT: 146 LBS | OXYGEN SATURATION: 98 % | HEART RATE: 60 BPM | DIASTOLIC BLOOD PRESSURE: 56 MMHG | BODY MASS INDEX: 22.13 KG/M2 | TEMPERATURE: 97 F | SYSTOLIC BLOOD PRESSURE: 130 MMHG | HEIGHT: 68 IN

## 2020-08-24 DIAGNOSIS — R73.03 PRE-DIABETES: ICD-10-CM

## 2020-08-24 DIAGNOSIS — K21.9 GASTROESOPHAGEAL REFLUX DISEASE WITHOUT ESOPHAGITIS: ICD-10-CM

## 2020-08-24 DIAGNOSIS — I25.10 MILD CAD: Primary | ICD-10-CM

## 2020-08-24 DIAGNOSIS — Z13.9 ENCOUNTER FOR SCREENING: ICD-10-CM

## 2020-08-24 DIAGNOSIS — M48.061 SPINAL STENOSIS OF LUMBAR REGION WITHOUT NEUROGENIC CLAUDICATION: ICD-10-CM

## 2020-08-24 DIAGNOSIS — Z85.46 H/O PROSTATE CANCER: ICD-10-CM

## 2020-08-24 DIAGNOSIS — E78.00 HYPERCHOLESTEROLEMIA: ICD-10-CM

## 2020-08-24 PROCEDURE — 99214 OFFICE O/P EST MOD 30 MIN: CPT | Performed by: INTERNAL MEDICINE

## 2020-08-24 PROCEDURE — 1111F DSCHRG MED/CURRENT MED MERGE: CPT | Performed by: INTERNAL MEDICINE

## 2020-08-24 PROCEDURE — G0463 HOSPITAL OUTPT CLINIC VISIT: HCPCS | Performed by: INTERNAL MEDICINE

## 2020-08-24 NOTE — PROGRESS NOTES
Denver Laundry is a 68year old male who presents for     6 mo check    CAD  On 7/8/20–calcium score 277 of which 217 is in RCA.     stress echo-7/27/20---2 mm ST deviation, multiple wall motion abnormalities  Cardiac catheterization 8/4/20 Dr Kimi Lucas - 50% • Lumbar herniated disc 1976    was hosp in traction and had back brace in 1976 after hurt back taboganning   • Mild CAD 2020    Cardiac cath 8/4/20 Dr Salguero--50% LAD stenosis. medical mgmt.    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  Last LDL 81 on 6/1/20. Dr Elizbeth Gosselin ord f/u lipid ast alt to be in Sept 2020.      Prediabetes--A1C 6.0 on 2/20/20--was 5.8 on 8/9/19--was  5.9 on 2/12/19. Cont diet. Lumbar stenosis--hx LESIs.  Had shots in back x2 1/2018  Rosuvastatin Calcium 40 MG Oral Tab Take 40 mg by mouth daily. • aspirin 81 MG Oral Chew Tab Chew 81 mg by mouth once. • Multiple Vitamins-Minerals (MULTI COMPLETE/IRON OR) Take by mouth daily.      • baclofen 10 MG Oral Tab Take 1 tablet (10 mg t

## 2020-08-25 ENCOUNTER — TELEPHONE (OUTPATIENT)
Dept: INTERNAL MEDICINE CLINIC | Facility: CLINIC | Age: 80
End: 2020-08-25

## 2020-08-25 NOTE — TELEPHONE ENCOUNTER
To nursing, please tell patient I received the results of the ultrasound screening of his carotid arteries and abdominal aorta–-results are normal.  Thanks. Note to self–  8/24/20–Card US PV SCREENING FREE--normal carotid ultrasound, no AAA.

## 2020-09-08 ENCOUNTER — TELEPHONE (OUTPATIENT)
Dept: INTERNAL MEDICINE CLINIC | Facility: CLINIC | Age: 80
End: 2020-09-08

## 2020-09-08 ENCOUNTER — LAB ENCOUNTER (OUTPATIENT)
Dept: LAB | Age: 80
End: 2020-09-08
Attending: INTERNAL MEDICINE
Payer: MEDICARE

## 2020-09-08 DIAGNOSIS — E78.49 FAMILIAL COMBINED HYPERLIPIDEMIA: Primary | ICD-10-CM

## 2020-09-08 DIAGNOSIS — E78.00 HYPERCHOLESTEROLEMIA: ICD-10-CM

## 2020-09-08 DIAGNOSIS — R73.03 PRE-DIABETES: ICD-10-CM

## 2020-09-08 DIAGNOSIS — Z85.46 H/O PROSTATE CANCER: ICD-10-CM

## 2020-09-08 LAB
ALBUMIN SERPL-MCNC: 3.9 G/DL
ALBUMIN SERPL-MCNC: 3.9 G/DL (ref 3.4–5)
ALBUMIN/GLOB SERPL: 1.2 {RATIO}
ALBUMIN/GLOB SERPL: 1.2 {RATIO} (ref 1–2)
ALP LIVER SERPL-CCNC: 55 U/L (ref 45–117)
ALP SERPL-CCNC: 55 U/L
ALT SERPL-CCNC: 33 U/L (ref 16–61)
ALT SERPL-CCNC: 33 UNITS/L
ALT SERPL-CCNC: 34 U/L (ref 16–61)
ALT SERPL-CCNC: 34 UNITS/L
ALT SERPL-CCNC: 34 UNITS/L
ANION GAP SERPL CALC-SCNC: 5 MMOL/L
ANION GAP SERPL CALC-SCNC: 5 MMOL/L (ref 0–18)
AST SERPL-CCNC: 24 U/L (ref 15–37)
AST SERPL-CCNC: 24 UNITS/L
AST SERPL-CCNC: 29 U/L (ref 15–37)
AST SERPL-CCNC: 29 UNITS/L
AST SERPL-CCNC: 29 UNITS/L
BILIRUB SERPL-MCNC: 0.4 MG/DL
BILIRUB SERPL-MCNC: 0.4 MG/DL (ref 0.1–2)
BILIRUB UR QL: NEGATIVE
BUN BLD-MCNC: 14 MG/DL (ref 7–18)
BUN SERPL-MCNC: 14 MG/DL
BUN/CREAT SERPL: 14.1
BUN/CREAT SERPL: 14.1 (ref 10–20)
CALCIUM BLD-MCNC: 9.2 MG/DL (ref 8.5–10.1)
CALCIUM SERPL-MCNC: 9.2 MG/DL
CHLORIDE SERPL-SCNC: 106 MMOL/L
CHLORIDE SERPL-SCNC: 106 MMOL/L (ref 98–112)
CHOLEST SERPL-MCNC: 168 MG/DL
CHOLEST SERPL-MCNC: 74 MG/DL
CHOLEST SMN-MCNC: 168 MG/DL (ref ?–200)
CHOLEST/HDLC SERPL: 79 {RATIO}
CLARITY UR: CLEAR
CO2 SERPL-SCNC: 29 MMOL/L
CO2 SERPL-SCNC: 29 MMOL/L (ref 21–32)
COLOR UR: YELLOW
CREAT BLD-MCNC: 0.99 MG/DL (ref 0.7–1.3)
CREAT SERPL-MCNC: 0.99 MG/DL
EST. AVERAGE GLUCOSE BLD GHB EST-MCNC: 123 MG/DL (ref 68–126)
GLOBULIN PLAS-MCNC: 3.2 G/DL (ref 2.8–4.4)
GLOBULIN SER-MCNC: 3.2 G/DL
GLUCOSE BLD-MCNC: 102 MG/DL (ref 70–99)
GLUCOSE SERPL-MCNC: 102 MG/DL
GLUCOSE UR-MCNC: NEGATIVE MG/DL
HBA1C MFR BLD HPLC: 5.9 % (ref ?–5.7)
HDLC SERPL-MCNC: 74 MG/DL
HDLC SERPL-MCNC: 74 MG/DL (ref 40–59)
HGB UR QL STRIP.AUTO: NEGATIVE
KETONES UR-MCNC: NEGATIVE MG/DL
LDLC SERPL CALC-MCNC: 79 MG/DL
LDLC SERPL CALC-MCNC: 79 MG/DL (ref ?–100)
LENGTH OF FAST TIME PATIENT: YES H
LEUKOCYTE ESTERASE UR QL STRIP.AUTO: NEGATIVE
M PROTEIN MFR SERPL ELPH: 7.1 G/DL (ref 6.4–8.2)
NITRITE UR QL STRIP.AUTO: NEGATIVE
NONHDLC SERPL-MCNC: 94 MG/DL
NONHDLC SERPL-MCNC: 94 MG/DL
NONHDLC SERPL-MCNC: 94 MG/DL (ref ?–130)
OSMOLALITY SERPL CALC.SUM OF ELEC: 291 MOSM/KG (ref 275–295)
PATIENT FASTING Y/N/NP: YES
PATIENT FASTING Y/N/NP: YES
PH UR: 8 [PH] (ref 5–8)
POTASSIUM SERPL-SCNC: 4.6 MMOL/L
POTASSIUM SERPL-SCNC: 4.6 MMOL/L (ref 3.5–5.1)
PROT SERPL-MCNC: 7.1 G/DL
PROT UR-MCNC: NEGATIVE MG/DL
PSA SERPL-MCNC: <0.01 NG/ML (ref ?–4)
SODIUM SERPL-SCNC: 140 MMOL/L
SODIUM SERPL-SCNC: 140 MMOL/L (ref 136–145)
SP GR UR STRIP: 1.01 (ref 1–1.03)
TRIGL SERPL-MCNC: 77 MG/DL
TRIGL SERPL-MCNC: 77 MG/DL
TRIGL SERPL-MCNC: 77 MG/DL (ref 30–149)
TSH SERPL-ACNC: 2.91 MCUNITS/ML
TSI SER-ACNC: 2.91 MIU/ML (ref 0.36–3.74)
UROBILINOGEN UR STRIP-ACNC: <2
VLDLC SERPL CALC-MCNC: 15 MG/DL
VLDLC SERPL CALC-MCNC: 15 MG/DL
VLDLC SERPL CALC-MCNC: 15 MG/DL (ref 0–30)

## 2020-09-08 PROCEDURE — 84460 ALANINE AMINO (ALT) (SGPT): CPT

## 2020-09-08 PROCEDURE — 81003 URINALYSIS AUTO W/O SCOPE: CPT

## 2020-09-08 PROCEDURE — 84153 ASSAY OF PSA TOTAL: CPT

## 2020-09-08 PROCEDURE — 84450 TRANSFERASE (AST) (SGOT): CPT

## 2020-09-08 PROCEDURE — 83036 HEMOGLOBIN GLYCOSYLATED A1C: CPT

## 2020-09-08 PROCEDURE — 80061 LIPID PANEL: CPT

## 2020-09-08 PROCEDURE — 80053 COMPREHEN METABOLIC PANEL: CPT

## 2020-09-08 PROCEDURE — 84443 ASSAY THYROID STIM HORMONE: CPT

## 2020-09-08 PROCEDURE — 36415 COLL VENOUS BLD VENIPUNCTURE: CPT

## 2020-09-08 NOTE — TELEPHONE ENCOUNTER
Spoke with patient and relayed Dr. Reyna Vergara' message that lab results are okay. Patient verbalized understanding.

## 2020-09-08 NOTE — TELEPHONE ENCOUNTER
To nursing, please tell patient lab results are okay. Thanks. Note to self–labs under my orders: 9/8/20–CMP TSH A1c UA PSA–ok. A1c 5.9. Dr. Neto Troncoso did lipid AST ALT–LDL 79 TG 77.

## 2020-09-09 ENCOUNTER — CLINICAL ABSTRACT (OUTPATIENT)
Dept: CARDIOLOGY | Age: 80
End: 2020-09-09

## 2020-09-14 ENCOUNTER — CLINICAL ABSTRACT (OUTPATIENT)
Dept: CARDIOLOGY | Age: 80
End: 2020-09-14

## 2020-09-14 ENCOUNTER — OFFICE VISIT (OUTPATIENT)
Dept: CARDIOLOGY | Age: 80
End: 2020-09-14

## 2020-09-14 VITALS
HEIGHT: 68 IN | SYSTOLIC BLOOD PRESSURE: 128 MMHG | DIASTOLIC BLOOD PRESSURE: 56 MMHG | WEIGHT: 150 LBS | HEART RATE: 60 BPM | OXYGEN SATURATION: 99 % | BODY MASS INDEX: 22.73 KG/M2

## 2020-09-14 DIAGNOSIS — I25.10 CORONARY ARTERY DISEASE INVOLVING NATIVE CORONARY ARTERY OF NATIVE HEART WITHOUT ANGINA PECTORIS: Primary | ICD-10-CM

## 2020-09-14 DIAGNOSIS — E78.49 OTHER HYPERLIPIDEMIA: ICD-10-CM

## 2020-09-14 PROCEDURE — 99214 OFFICE O/P EST MOD 30 MIN: CPT | Performed by: INTERNAL MEDICINE

## 2020-09-14 SDOH — HEALTH STABILITY: MENTAL HEALTH: HOW OFTEN DO YOU HAVE A DRINK CONTAINING ALCOHOL?: MONTHLY OR LESS

## 2020-09-14 ASSESSMENT — PATIENT HEALTH QUESTIONNAIRE - PHQ9
SUM OF ALL RESPONSES TO PHQ9 QUESTIONS 1 AND 2: 0
2. FEELING DOWN, DEPRESSED OR HOPELESS: NOT AT ALL
SUM OF ALL RESPONSES TO PHQ9 QUESTIONS 1 AND 2: 0
1. LITTLE INTEREST OR PLEASURE IN DOING THINGS: NOT AT ALL
CLINICAL INTERPRETATION OF PHQ2 SCORE: NO FURTHER SCREENING NEEDED
CLINICAL INTERPRETATION OF PHQ9 SCORE: NO FURTHER SCREENING NEEDED

## 2020-09-16 ENCOUNTER — APPOINTMENT (OUTPATIENT)
Dept: CARDIOLOGY | Age: 80
End: 2020-09-16

## 2020-09-21 ENCOUNTER — IMMUNIZATION (OUTPATIENT)
Dept: INTERNAL MEDICINE CLINIC | Facility: CLINIC | Age: 80
End: 2020-09-21
Payer: MEDICARE

## 2020-09-21 DIAGNOSIS — Z23 NEED FOR VACCINATION: ICD-10-CM

## 2020-09-21 PROCEDURE — G0008 ADMIN INFLUENZA VIRUS VAC: HCPCS | Performed by: INTERNAL MEDICINE

## 2020-09-21 PROCEDURE — 90662 IIV NO PRSV INCREASED AG IM: CPT | Performed by: INTERNAL MEDICINE

## 2020-11-15 NOTE — TELEPHONE ENCOUNTER
Patient : Perez Adams Age: 31 month old Sex: male   MRN: [id number] Encounter Date: 11/15/2020      History   CHIEF COMPLAINT    Chief Complaint   Patient presents with   • Laceration       HPI    Perez Adams is a 31 month old male with no PMH who presents to the ED with complaints of a head laceration.  Prior to arrival, patient's mom reports that patient was playing with his siblings, when he accidentally ran headfirst into a wall.  She complains of a forehead laceration.  She denies any LOC.  She reports that he immediately cried, but has been consolable.  She denies any vomiting.  She reports that he has been acting like his normal self.  He is actively moving his head and extremities.  No other known injuries, symptoms, or complaints.  Bleeding is controlled.  Patient is up-to-date on his immunizations.      ALLERGIES:   Allergen Reactions   • Penicillins Other (See Comments)     Dad is allergic, other antibiotics per mom       There are no discharge medications for this patient.      There are no discharge medications for this patient.      History reviewed. No pertinent past medical history.    History reviewed. No pertinent surgical history.    History reviewed. No pertinent family history.    Social History     Tobacco Use   • Smoking status: Never Smoker   • Smokeless tobacco: Never Used   Substance Use Topics   • Alcohol use: Never     Frequency: Never   • Drug use: Never       ROS:  All other systems were reviewed and were negative, except as stated in HPI.    Physical Exam     Vitals:    11/15/20 1042 11/15/20 1159   BP: (!) 107/75 (!) 115/85   Pulse: 115 117   Resp: 24 25   Temp: 97.3 °F (36.3 °C)    TempSrc: Axillary    SpO2: 98% 98%   Weight: 16.6 kg (36 lb 9.5 oz)        Physical Exam   Constitutional: He appears well-developed and well-nourished. He is active.   HENT:   Head: Normocephalic.   Right Ear: Tympanic membrane normal.   Left Ear: Tympanic membrane normal.   Nose: Nose normal.  Pt. Returned call ph.  # [phone]   Routed to clinical   Mouth/Throat: Mucous membranes are moist. No oral lesions. No tonsillar exudate. Oropharynx is clear.   Forehead laceration noted; no swelling; no obvious tenderness; no crepitus   Eyes: Pupils are equal, round, and reactive to light. EOM are normal.   Neck: Normal range of motion. Neck supple.   Non-tender   Cardiovascular: Normal rate and regular rhythm.   Pulmonary/Chest: Effort normal and breath sounds normal. No nasal flaring. No respiratory distress. He exhibits no retraction.   Abdominal: Soft. He exhibits no distension. There is no abdominal tenderness.   Musculoskeletal: Normal range of motion.         General: No tenderness or deformity.   Neurological: He is alert.   Actively moving all extremities   Skin: Skin is warm and dry. No rash noted.   1.0cm laceration to upper forehead; no swelling; bleeding controlled   Nursing note and vitals reviewed.        RADIOLOGY    No orders to display         LABS    No results found for this visit on 11/15/20.      PROCEDURES    Laceration Repair  Performed by: Ingris Mcallister PA-C  Authorized by: Ingris Mcallister PA-C     Consent:     Consent obtained:  Verbal    Consent given by:  Parent  Anesthesia (see MAR for exact dosages):     Anesthesia method:  Topical application and local infiltration    Topical anesthetic:  Lidocaine gel    Local anesthetic:  Lidocaine 1% WITH epi  Laceration details:     Location:  Face    Face location:  Forehead    Length (cm):  1  Repair type:     Repair type:  Simple  Pre-procedure details:     Preparation:  Patient was prepped and draped in usual sterile fashion  Exploration:     Hemostasis achieved with:  Epinephrine  Treatment:     Area cleansed with:  Saline    Amount of cleaning:  Standard    Irrigation solution:  Sterile saline    Irrigation method:  Pressure wash  Skin repair:     Repair method:  Sutures    Suture size:  6-0    Suture material:  Prolene    Suture technique:  Simple interrupted    Number of sutures:   3  Approximation:     Approximation:  Close  Post-procedure details:     Dressing:  Antibiotic ointment and adhesive bandage    Patient tolerance of procedure:  Tolerated well, no immediate complications        ED Course   EMERGENCY DEPARTMENT MEDICAL DECISION MAKING:  After obtaining the patient's history, performing the physical exam and reviewing the diagnostics, multiple  initial diagnoses were considered based on the presenting problem.    Patient was seen and evaluated.  Topical lidocaine was applied.  Further local anesthesia was applied with 1% lidocaine/epinephrine.  The wound was thoroughly irrigated.  Suture repair was performed, which patient tolerated well.  Wound edges were well approximated.  Bacitracin and bandage were applied.  Patient's mom was informed of the results.  She felt comfortable with discharge and follow-up plan.       Impression:  The encounter diagnosis was Laceration of forehead, initial encounter.    Follow Up:  Randy Rojsa MD  [address] [facility] [facility] [address]  [facility] [id number] [phone]    In 2 days         There are no discharge medications for this patient.      Keep wound clean and dry.  Do not get wet for next 2-3 days.  Afterwards, may get wet, but no soaking or swimming until stitches are out.  Apply neosporin 2x/day for next 2-3 days.  Follow-up with your doctor for suture removal in 5 days.  Return immediately if any new/worsening/persistent symptoms.      Discharge Instructions were provided  Pt is discharged in stable condition.      Signed:  Ingris Mcallister PA-C  Patient seen in conjunction with attending physician, Dr. Zamora.    In light of the current and rapidly developing COVID-19 pandemic, I have attempted to carefully consider the risks and benefits of prolonged ED work-ups and/or hospitalization vs their risk of acquiring or transmitting SARS-CoV-2.  I have made reasonable efforts to conserve healthcare resources and defer to safe outpatient  alternatives when practical.  I have also discussed the importance of social distancing and proper hygiene to the patient and/or caretakers.        Ingris Mcallister PA-C  11/15/20 9950

## 2020-12-14 ENCOUNTER — TELEPHONE (OUTPATIENT)
Dept: INTERNAL MEDICINE CLINIC | Facility: CLINIC | Age: 80
End: 2020-12-14

## 2020-12-14 NOTE — TELEPHONE ENCOUNTER
Patient is calling back, no nurse available. Patient is looking for his blood type not a type and screen. Please call patient back.

## 2020-12-15 NOTE — TELEPHONE ENCOUNTER
To Dr. Bhavya Cason----    Pt calling to inquire what his blood type is. RN did not note history of type and screen. Did you want to order lab? Pended.

## 2020-12-16 NOTE — TELEPHONE ENCOUNTER
To nursing, please tell patient, no, we do not have his blood type. This is not a test that is usually ordered unless you are going to get a transfusion. Thanks.

## 2021-02-15 ENCOUNTER — TELEPHONE (OUTPATIENT)
Dept: INTERNAL MEDICINE CLINIC | Facility: CLINIC | Age: 81
End: 2021-02-15

## 2021-02-15 DIAGNOSIS — R73.03 PRE-DIABETES: Primary | ICD-10-CM

## 2021-02-15 RX ORDER — VALACYCLOVIR HYDROCHLORIDE 1 G/1
TABLET, FILM COATED ORAL
Qty: 12 TABLET | Refills: 3 | Status: SHIPPED | OUTPATIENT
Start: 2021-02-15

## 2021-02-15 NOTE — TELEPHONE ENCOUNTER
To nursing, please tell patient   lab orders are entered for him to do before 3/1/21 visit. He only needs 2 blood tests-CMP and A1c. The rest of the blood and urine tests were already done in August/ September 2020. Thx. Note to self-  Lab before 3/1/21 visit--CMP A1c.  1. Pre-diabetes  - COMP METABOLIC PANEL (14);  Future  - HEMOGLOBIN A1C; Future

## 2021-02-15 NOTE — TELEPHONE ENCOUNTER
Valacyclovir refill pended to Dr. Kacey Medeiros for review. Patient is requesting a one year supply. Last sent 8/14/19 for #8 with 1 refill.

## 2021-02-15 NOTE — TELEPHONE ENCOUNTER
Called and Relayed MD's message to patient---verbalized understanding    To : please call patient to schedule a lab appt.  He prefers early morning appt ([facility])

## 2021-02-15 NOTE — TELEPHONE ENCOUNTER
Pt. Scheduled for his annual [facility] on 3-1-21. He is asking Dr. Yee Elam to place lab and UA orders into the system.

## 2021-02-15 NOTE — TELEPHONE ENCOUNTER
Refill sent to [facility]:  Valacyclovir 1 g–take 2 tabs by mouth at onset of cold sore and repeat dose once with 2 tablets in 12 hours. #12 with 3 refills.

## 2021-02-23 ENCOUNTER — LAB ENCOUNTER (OUTPATIENT)
Dept: LAB | Age: 81
End: 2021-02-23
Attending: INTERNAL MEDICINE
Payer: MEDICARE

## 2021-02-23 DIAGNOSIS — R73.03 PRE-DIABETES: ICD-10-CM

## 2021-02-23 LAB
ALBUMIN SERPL-MCNC: 4 G/DL (ref 3.4–5)
ALBUMIN/GLOB SERPL: 1.3 {RATIO} (ref 1–2)
ALP LIVER SERPL-CCNC: 53 U/L
ALT SERPL-CCNC: 44 U/L
ALT SERPL-CCNC: 44 UNITS/L
ANION GAP SERPL CALC-SCNC: 3 MMOL/L (ref 0–18)
AST SERPL-CCNC: 31 U/L (ref 15–37)
AST SERPL-CCNC: 31 UNITS/L
BILIRUB SERPL-MCNC: 0.4 MG/DL (ref 0.1–2)
BUN BLD-MCNC: 13 MG/DL (ref 7–18)
BUN SERPL-MCNC: 13 MG/DL
BUN/CREAT SERPL: 13.8
BUN/CREAT SERPL: 13.8 (ref 10–20)
CALCIUM BLD-MCNC: 9.3 MG/DL (ref 8.5–10.1)
CALCIUM SERPL-MCNC: 9.3 MG/DL
CHLORIDE SERPL-SCNC: 105 MMOL/L
CHLORIDE SERPL-SCNC: 105 MMOL/L (ref 98–112)
CO2 SERPL-SCNC: 31 MMOL/L
CO2 SERPL-SCNC: 31 MMOL/L (ref 21–32)
CREAT BLD-MCNC: 0.94 MG/DL
CREAT SERPL-MCNC: 0.94 MG/DL
EST. AVERAGE GLUCOSE BLD GHB EST-MCNC: 123 MG/DL (ref 68–126)
GLOBULIN PLAS-MCNC: 3 G/DL (ref 2.8–4.4)
GLUCOSE BLD-MCNC: 102 MG/DL (ref 70–99)
GLUCOSE SERPL-MCNC: 102 MG/DL
HBA1C MFR BLD HPLC: 5.9 % (ref ?–5.7)
M PROTEIN MFR SERPL ELPH: 7 G/DL (ref 6.4–8.2)
OSMOLALITY SERPL CALC.SUM OF ELEC: 288 MOSM/KG (ref 275–295)
PATIENT FASTING Y/N/NP: YES
POTASSIUM SERPL-SCNC: 4.9 MMOL/L
POTASSIUM SERPL-SCNC: 4.9 MMOL/L (ref 3.5–5.1)
SODIUM SERPL-SCNC: 139 MMOL/L
SODIUM SERPL-SCNC: 139 MMOL/L (ref 136–145)

## 2021-02-23 PROCEDURE — 36415 COLL VENOUS BLD VENIPUNCTURE: CPT

## 2021-02-23 PROCEDURE — 80053 COMPREHEN METABOLIC PANEL: CPT

## 2021-02-23 PROCEDURE — 83036 HEMOGLOBIN GLYCOSYLATED A1C: CPT

## 2021-03-01 ENCOUNTER — OFFICE VISIT (OUTPATIENT)
Dept: INTERNAL MEDICINE CLINIC | Facility: CLINIC | Age: 81
End: 2021-03-01
Payer: MEDICARE

## 2021-03-01 VITALS
HEART RATE: 60 BPM | BODY MASS INDEX: 23.7 KG/M2 | SYSTOLIC BLOOD PRESSURE: 126 MMHG | WEIGHT: 151 LBS | OXYGEN SATURATION: 99 % | DIASTOLIC BLOOD PRESSURE: 50 MMHG | HEIGHT: 67 IN | TEMPERATURE: 98 F

## 2021-03-01 DIAGNOSIS — R73.03 PREDIABETES: ICD-10-CM

## 2021-03-01 DIAGNOSIS — R09.82 POST-NASAL DRIP: ICD-10-CM

## 2021-03-01 DIAGNOSIS — Z85.46 H/O PROSTATE CANCER: ICD-10-CM

## 2021-03-01 DIAGNOSIS — K21.9 GASTROESOPHAGEAL REFLUX DISEASE WITHOUT ESOPHAGITIS: ICD-10-CM

## 2021-03-01 DIAGNOSIS — E78.00 HYPERCHOLESTEROLEMIA: ICD-10-CM

## 2021-03-01 DIAGNOSIS — I25.10 MILD CAD: ICD-10-CM

## 2021-03-01 DIAGNOSIS — Z00.00 MEDICARE ANNUAL WELLNESS VISIT, SUBSEQUENT: Primary | ICD-10-CM

## 2021-03-01 DIAGNOSIS — M48.061 SPINAL STENOSIS OF LUMBAR REGION WITHOUT NEUROGENIC CLAUDICATION: ICD-10-CM

## 2021-03-01 PROCEDURE — G0439 PPPS, SUBSEQ VISIT: HCPCS | Performed by: INTERNAL MEDICINE

## 2021-03-01 NOTE — PROGRESS NOTES
Doreatha Hatchet is a 80year old male who presents for     6 mo check and medicare annual    \"I feel good. \"    CAD--hx Cardiac cath after abn stress echo 8/4/20 Dr Ifeoma Smiley - 50% LAD stenosis with recom for med mgmt. No CP or SOB.  Exercising on elliptical High cholesterol    • Hyperlipidemia    • Kidney stones 1980    removed via cystoscopy at [address]   • Lumbar degenerative disc disease     Dr Nancy Dugan did LESIs 2013, 2014, 2015; has seen Dr. Hero Brady and has seen Dr. Tory Koyanagi neuro Diabetes Brother    • Other (overweight[other]) Brother    • Other (back surgery[other]) Brother    • Other (1 brother[other]) Other    • Breast Cancer Daughter 39        DCIS-had double mastectomy   • Diabetes Brother    • Lipids Brother    • Glaucoma Neg echo-7/27/20---2 mm ST deviation, multiple wall motion abnormalities  Cardiac catheterization 8/4/20 Dr Norberto Rincon - 50% LAD stenosis with recom for med mgmt. Follows w Dr Norberto Rincon. Hypercholesteremia--Dr Norberto Rincon rx Crestor 40 mg daily.  Last LDL 79 on 9/8/20. Rosuvastatin Calcium 40 MG Oral Tab Take 40 mg by mouth daily. • aspirin 81 MG Oral Chew Tab Chew 81 mg by mouth once. • Multiple Vitamins-Minerals (MULTI COMPLETE/IRON OR) Take by mouth daily.      • baclofen 10 MG Oral Tab Take 1 tablet (10 mg t : No    Difficulty walking?: No    Difficulty dressing or bathing?: No    Problems with daily activities? : No    Memory Problems?: No      Fall/Risk Assessment          Have you fallen in the last 12 months?: 0-No                                        Fa

## 2021-03-08 ENCOUNTER — TELEPHONE (OUTPATIENT)
Dept: CARDIOLOGY | Age: 81
End: 2021-03-08

## 2021-03-15 ENCOUNTER — OFFICE VISIT (OUTPATIENT)
Dept: CARDIOLOGY | Age: 81
End: 2021-03-15

## 2021-03-15 VITALS
SYSTOLIC BLOOD PRESSURE: 134 MMHG | HEART RATE: 61 BPM | DIASTOLIC BLOOD PRESSURE: 60 MMHG | BODY MASS INDEX: 22.76 KG/M2 | OXYGEN SATURATION: 98 % | WEIGHT: 145 LBS | HEIGHT: 67 IN

## 2021-03-15 DIAGNOSIS — E78.49 OTHER HYPERLIPIDEMIA: Primary | ICD-10-CM

## 2021-03-15 DIAGNOSIS — R94.31 ABNORMAL ELECTROCARDIOGRAM (ECG) (EKG): ICD-10-CM

## 2021-03-15 DIAGNOSIS — I25.10 CORONARY ARTERY DISEASE INVOLVING NATIVE CORONARY ARTERY OF NATIVE HEART WITHOUT ANGINA PECTORIS: ICD-10-CM

## 2021-03-15 DIAGNOSIS — R94.39 ABNORMAL STRESS TEST: ICD-10-CM

## 2021-03-15 PROCEDURE — 99214 OFFICE O/P EST MOD 30 MIN: CPT | Performed by: INTERNAL MEDICINE

## 2021-03-15 RX ORDER — ASPIRIN 81 MG/1
81 TABLET, CHEWABLE ORAL DAILY
COMMUNITY

## 2021-03-15 SDOH — HEALTH STABILITY: MENTAL HEALTH: HOW OFTEN DO YOU HAVE A DRINK CONTAINING ALCOHOL?: MONTHLY OR LESS

## 2021-03-15 ASSESSMENT — PATIENT HEALTH QUESTIONNAIRE - PHQ9
CLINICAL INTERPRETATION OF PHQ9 SCORE: NO FURTHER SCREENING NEEDED
1. LITTLE INTEREST OR PLEASURE IN DOING THINGS: NOT AT ALL
SUM OF ALL RESPONSES TO PHQ9 QUESTIONS 1 AND 2: 0
2. FEELING DOWN, DEPRESSED OR HOPELESS: NOT AT ALL
CLINICAL INTERPRETATION OF PHQ2 SCORE: NO FURTHER SCREENING NEEDED
SUM OF ALL RESPONSES TO PHQ9 QUESTIONS 1 AND 2: 0

## 2021-04-14 NOTE — TELEPHONE ENCOUNTER
Date: April 16, 2021  Time In: 1:30 PM  Time Out: 2:02 PM  This provider is located at the [facility]), [address], [place] [address] using a secure Digital Message Displayhart Video Visit through [employer]. Patient is being seen remotely via telehealth at home address in [place] and stated they are in a secure environment for this session. The patient's condition being diagnosed/treated is appropriate for telemedicine. The provider identified herself as well as her credentials. The patient, and/or patients guardian, consent to be seen remotely, and when consent is given they understand that the consent allows for patient identifiable information to be sent to a third party as needed. They may refuse to be seen remotely at any time. The electronic data is encrypted and password protected, and the patient and/or guardian has been advised of the potential risks to privacy not withstanding such measures.     You have chosen to receive care through a telehealth visit.  Do you consent to use a video/audio connection for your medical care today? Yes    PROGRESS NOTE  Data:  Sudhakar Saul is a 49 y.o. female who presents today for follow up and care planning. Patient has been worried about her mother who has been having chest pain and has had a heart attack before and gets scared because she doesn't drive. Patient states that she has been trying feel better about getting out of the house now as she got her first COVID vaccine. Patient wishes to work on her feelings of anxiety and paranoia to minimize the effects of these things in her life.     Chief Complaint:  Racing thoughts, sleeping 5-6 hours a night, patient was having a panic attacks until a week ago.     History of Present Illness: Patient is a former substance abuser who has dealt with issues of anxiety and depression and paranoia since she was 27 years old.      Clinical Maneuvering/Intervention: care planning  To Dr. Chandler Lagos, please advise.  Rx pending and CBT to address current situations such as her mother having heart trouble and patient not being able to drive and the improvements she has seen since the changes in her medication in addition to how she wants to continue with her life.    (Scales based on 0 - 10 with 10 being the worst)  Depression: 6 Anxiety: 5       Assisted patient with developing a care plan to address her needs and symptoms; acknowledged and normalized patient’s thoughts, feelings, and concerns in regard to the effectiveness of her medication and how she has changed over time.  Rationalized patient thought process regarding feelings of panic in regard to her mother's heart condition and feeling helpless due to not being able to drive.  Patient is relieved that her mother is doing better but she wants to be able to do more with her own life to help her mother. Discussed triggers associated with patient's anxiety and paranoia such as running into someone that she used to use with.  Also discussed coping skills for patient to implement such as grounding, researching things that she wants to do to look at ways to decrease barriers to achievement, and relaxation skills for times when she begins to feel overwhelmed.    Allowed patient to freely discuss issues without interruption or judgment. Provided safe, confidential environment to facilitate the development of positive therapeutic relationship and encourage open, honest communication. Assisted patient in identifying risk factors which would indicate the need for higher level of care including thoughts to harm self or others and/or self-harming behavior and encouraged patient to contact this office, call 911, or present to the nearest emergency room should any of these events occur. Discussed crisis intervention services and means to access. Patient adamantly and convincingly denies current suicidal or homicidal ideation or perceptual disturbance.    Assessment:   Assessment   Patient was able  to work with therapist in order to develop a baseline for the problems listed on her care plan.  Patient struggles with feelings of anxiety and paranoia which lead her to  which continues to cause impairment in important areas of functioning.  A result, they can be reasonably expected to continue to benefit from treatment and would likely be at increased risk for decompensation otherwise.    Mental Status Exam:   Hygiene:   fair  Cooperation:  Cooperative  Eye Contact:  Good  Psychomotor Behavior:  Appropriate  Affect:  Appropriate  Mood: fluctates  Speech:  Normal  Thought Process:  Goal directed  Thought Content:  Normal  Suicidal:  None  Homicidal:  None  Hallucinations:  Visual and Not demonstrated today- a few days ago was shadow movement  Delusion:  Paranoid  Memory:  Deficits- ran into someone from past that she used to use with and that triggered the patient back to those days and situations that occurred then.  Orientation:  Person, Place, Time and Situation  Reliability:  good  Insight:  Fair  Judgement:  Fair  Impulse Control:  Fair  Physical/Medical Issues:  Yes type II diabetes, COPD, stage 2 kidney disease, seizures, bone and joint issues that require patient to use a cane       Patient's Support Network Includes:  parents    Functional Status: Moderate impairment     Progress toward goal: Not at goal; goals were established today with the development of the care plan     Prognosis: Guarded with Ongoing Treatment         Plan:    Patient will continue in individual outpatient therapy with focus on improved functioning and coping skills, maintaining stability, and avoiding decompensation and the need for higher level of care.    Patient will adhere to medication regimen as prescribed and report any side effects. Patient will contact this office, call 911 or present to the nearest emergency room should suicidal or homicidal ideations occur. Provide Cognitive Behavioral Therapy and Solution Focused  Therapy to improve functioning, maintain stability, and avoid decompensation and the need for higher level of care.     Return in about 3 weeks, or earlier if symptoms worsen or fail to improve.           VISIT DIAGNOSIS:     ICD-10-CM ICD-9-CM   1. Schizophrenia, paranoid type (CMS/[facility])  F20.0 295.30             This document has been electronically signed by DREAD Vo  April 16, 2021 10:41 EDT      Part of this note may be an electronic transcription/translation of spoken language to printed text using the Dragon Dictation System.

## 2021-05-20 RX ORDER — ROSUVASTATIN CALCIUM 40 MG/1
40 TABLET, COATED ORAL DAILY
Qty: 90 TABLET | Refills: 0 | Status: SHIPPED | OUTPATIENT
Start: 2021-05-20

## 2021-06-28 ENCOUNTER — TELEPHONE (OUTPATIENT)
Dept: INTERNAL MEDICINE CLINIC | Facility: CLINIC | Age: 81
End: 2021-06-28

## 2021-06-29 RX ORDER — ROSUVASTATIN CALCIUM 40 MG/1
TABLET, COATED ORAL
Qty: 90 TABLET | Refills: 3 | OUTPATIENT
Start: 2021-06-29

## 2021-06-30 RX ORDER — HYDROCORTISONE 25 MG/G
CREAM TOPICAL
Qty: 85.05 G | Refills: 0 | Status: SHIPPED | OUTPATIENT
Start: 2021-06-30

## 2021-06-30 RX ORDER — FLUTICASONE PROPIONATE 50 MCG
SPRAY, SUSPENSION (ML) NASAL
Qty: 48 G | Refills: 3 | Status: SHIPPED | OUTPATIENT
Start: 2021-06-30

## 2021-06-30 NOTE — TELEPHONE ENCOUNTER
Refill sent to [facility]--  Proctosol 2.5% cream to hemorrhoids daily prn 85.05 gm tube as pharmacy req. .   Nursing had already sent flonase nasal 2 sprays nasally as needed. 48 g with 3 RFs.

## 2021-07-01 RX ORDER — ROSUVASTATIN CALCIUM 40 MG/1
TABLET, COATED ORAL
Qty: 90 TABLET | Refills: 3 | OUTPATIENT
Start: 2021-07-01

## 2021-07-21 RX ORDER — ROSUVASTATIN CALCIUM 40 MG/1
TABLET, COATED ORAL
Qty: 90 TABLET | Refills: 3 | OUTPATIENT
Start: 2021-07-21

## 2021-08-20 RX ORDER — ROSUVASTATIN CALCIUM 40 MG/1
TABLET, COATED ORAL
Qty: 90 TABLET | Refills: 3 | OUTPATIENT
Start: 2021-08-20

## 2021-08-20 RX ORDER — ROSUVASTATIN CALCIUM 40 MG/1
TABLET, COATED ORAL
Qty: 90 TABLET | Refills: 1 | OUTPATIENT
Start: 2021-08-20

## 2021-08-20 NOTE — TELEPHONE ENCOUNTER
Patient not seen at [facility]. Seen at [facility] [place]. Refill denied and message sent to pharmacy.

## 2021-09-10 NOTE — PROGRESS NOTES
Bhumika De Santiago is a 80year old male who presents for     6 mo check     Feels good. Does 92877 steps a day. Plays golf. CAD--hx Cardiac cath after abn stress echo 8/4/20 Dr Snajana Turpin - 50% LAD stenosis with recom for med mgmt. No CP or SOB.   Sees Dr Berlin Hernandez Comm hosp in [address]   • Lumbar degenerative disc disease     Dr Chuy Abbasi did LESIs 2013, 2014, 2015; has seen Dr. Layne López and has seen Dr. Raymond Thrasher neurosurg at [facility].     • Lumbar herniated disc 1976    was hosp in [place] and had back brace in Other (1 brother[other]) Other    • Breast Cancer Daughter 39        DCIS-had double mastectomy   • Diabetes Brother    • Lipids Brother    • Glaucoma Neg    • Macular degeneration Neg       Social History:   Social History    Tobacco Use      Smoking stat abnormalities  Cardiac catheterization 8/4/20 Dr Norberto Rincon - 50% LAD stenosis with recom for med mgmt. Follows w Dr Norberto Rincon. Hypercholesteremia--Dr Norberto Rincon rx Crestor 40 mg daily.  Last LDL 79 on 9/8/20. He orders lipids.      Prediabetes--A1C 5.9 on 2/23/21.  C chart/precharting, history, exam, reviewing assessment/ plan, orders and completing documentation.           Current Outpatient Medications   Medication Sig Dispense Refill   • FLUTICASONE PROPIONATE 50 MCG/ACT Nasal Suspension USE 2 SPRAYS NASALLY DAILY  A

## 2021-09-13 ENCOUNTER — LAB ENCOUNTER (OUTPATIENT)
Dept: LAB | Age: 81
End: 2021-09-13
Attending: INTERNAL MEDICINE
Payer: MEDICARE

## 2021-09-13 ENCOUNTER — TELEPHONE (OUTPATIENT)
Dept: INTERNAL MEDICINE CLINIC | Facility: CLINIC | Age: 81
End: 2021-09-13

## 2021-09-13 ENCOUNTER — OFFICE VISIT (OUTPATIENT)
Dept: INTERNAL MEDICINE CLINIC | Facility: CLINIC | Age: 81
End: 2021-09-13
Payer: MEDICARE

## 2021-09-13 VITALS
WEIGHT: 149 LBS | DIASTOLIC BLOOD PRESSURE: 66 MMHG | BODY MASS INDEX: 23.39 KG/M2 | TEMPERATURE: 98 F | HEART RATE: 66 BPM | OXYGEN SATURATION: 98 % | SYSTOLIC BLOOD PRESSURE: 120 MMHG | HEIGHT: 67 IN

## 2021-09-13 DIAGNOSIS — R73.03 PREDIABETES: ICD-10-CM

## 2021-09-13 DIAGNOSIS — R73.03 PREDIABETES: Primary | ICD-10-CM

## 2021-09-13 DIAGNOSIS — I25.10 MILD CAD: Primary | ICD-10-CM

## 2021-09-13 DIAGNOSIS — K21.9 GASTROESOPHAGEAL REFLUX DISEASE WITHOUT ESOPHAGITIS: ICD-10-CM

## 2021-09-13 DIAGNOSIS — E78.00 HYPERCHOLESTEROLEMIA: ICD-10-CM

## 2021-09-13 DIAGNOSIS — H61.23 BILATERAL IMPACTED CERUMEN: ICD-10-CM

## 2021-09-13 DIAGNOSIS — M48.061 SPINAL STENOSIS OF LUMBAR REGION WITHOUT NEUROGENIC CLAUDICATION: ICD-10-CM

## 2021-09-13 LAB
ALBUMIN SERPL-MCNC: 3.8 G/DL (ref 3.4–5)
ALBUMIN/GLOB SERPL: 1.4 {RATIO} (ref 1–2)
ALP LIVER SERPL-CCNC: 58 U/L
ALT SERPL-CCNC: 36 U/L
ANION GAP SERPL CALC-SCNC: 5 MMOL/L (ref 0–18)
AST SERPL-CCNC: 24 U/L (ref 15–37)
BASOPHILS # BLD AUTO: 0.04 X10(3) UL (ref 0–0.2)
BASOPHILS NFR BLD AUTO: 0.7 %
BILIRUB SERPL-MCNC: 0.4 MG/DL (ref 0.1–2)
BILIRUB UR QL: NEGATIVE
BUN BLD-MCNC: 15 MG/DL (ref 7–18)
BUN/CREAT SERPL: 16.3 (ref 10–20)
CALCIUM BLD-MCNC: 8.8 MG/DL (ref 8.5–10.1)
CHLORIDE SERPL-SCNC: 107 MMOL/L (ref 98–112)
CLARITY UR: CLEAR
CO2 SERPL-SCNC: 28 MMOL/L (ref 21–32)
COLOR UR: YELLOW
CREAT BLD-MCNC: 0.92 MG/DL
DEPRECATED RDW RBC AUTO: 44 FL (ref 35.1–46.3)
EOSINOPHIL # BLD AUTO: 0.18 X10(3) UL (ref 0–0.7)
EOSINOPHIL NFR BLD AUTO: 3 %
ERYTHROCYTE [DISTWIDTH] IN BLOOD BY AUTOMATED COUNT: 13 % (ref 11–15)
EST. AVERAGE GLUCOSE BLD GHB EST-MCNC: 120 MG/DL (ref 68–126)
GLOBULIN PLAS-MCNC: 2.8 G/DL (ref 2.8–4.4)
GLUCOSE BLD-MCNC: 107 MG/DL (ref 70–99)
GLUCOSE UR-MCNC: NEGATIVE MG/DL
HBA1C MFR BLD HPLC: 5.8 % (ref ?–5.7)
HCT VFR BLD AUTO: 40.9 %
HGB BLD-MCNC: 13.3 G/DL
HGB UR QL STRIP.AUTO: NEGATIVE
IMM GRANULOCYTES # BLD AUTO: 0.01 X10(3) UL (ref 0–1)
IMM GRANULOCYTES NFR BLD: 0.2 %
KETONES UR-MCNC: NEGATIVE MG/DL
LEUKOCYTE ESTERASE UR QL STRIP.AUTO: NEGATIVE
LYMPHOCYTES # BLD AUTO: 2.54 X10(3) UL (ref 1–4)
LYMPHOCYTES NFR BLD AUTO: 42.9 %
M PROTEIN MFR SERPL ELPH: 6.6 G/DL (ref 6.4–8.2)
MCH RBC QN AUTO: 29.9 PG (ref 26–34)
MCHC RBC AUTO-ENTMCNC: 32.5 G/DL (ref 31–37)
MCV RBC AUTO: 91.9 FL
MONOCYTES # BLD AUTO: 0.52 X10(3) UL (ref 0.1–1)
MONOCYTES NFR BLD AUTO: 8.8 %
NEUTROPHILS # BLD AUTO: 2.63 X10 (3) UL (ref 1.5–7.7)
NEUTROPHILS # BLD AUTO: 2.63 X10(3) UL (ref 1.5–7.7)
NEUTROPHILS NFR BLD AUTO: 44.4 %
NITRITE UR QL STRIP.AUTO: NEGATIVE
OSMOLALITY SERPL CALC.SUM OF ELEC: 291 MOSM/KG (ref 275–295)
PATIENT FASTING Y/N/NP: YES
PH UR: 7 [PH] (ref 5–8)
PLATELET # BLD AUTO: 259 10(3)UL (ref 150–450)
POTASSIUM SERPL-SCNC: 4.5 MMOL/L (ref 3.5–5.1)
PROT UR-MCNC: NEGATIVE MG/DL
RBC # BLD AUTO: 4.45 X10(6)UL
SODIUM SERPL-SCNC: 140 MMOL/L (ref 136–145)
SP GR UR STRIP: 1.01 (ref 1–1.03)
TSI SER-ACNC: 2.95 MIU/ML (ref 0.36–3.74)
UROBILINOGEN UR STRIP-ACNC: <2
WBC # BLD AUTO: 5.9 X10(3) UL (ref 4–11)

## 2021-09-13 PROCEDURE — 80053 COMPREHEN METABOLIC PANEL: CPT

## 2021-09-13 PROCEDURE — 99214 OFFICE O/P EST MOD 30 MIN: CPT | Performed by: INTERNAL MEDICINE

## 2021-09-13 PROCEDURE — 81003 URINALYSIS AUTO W/O SCOPE: CPT | Performed by: INTERNAL MEDICINE

## 2021-09-13 PROCEDURE — 85025 COMPLETE CBC W/AUTO DIFF WBC: CPT

## 2021-09-13 PROCEDURE — 83036 HEMOGLOBIN GLYCOSYLATED A1C: CPT

## 2021-09-13 PROCEDURE — 69210 REMOVE IMPACTED EAR WAX UNI: CPT | Performed by: INTERNAL MEDICINE

## 2021-09-13 PROCEDURE — 84443 ASSAY THYROID STIM HORMONE: CPT

## 2021-09-13 PROCEDURE — 36415 COLL VENOUS BLD VENIPUNCTURE: CPT

## 2021-09-13 NOTE — TELEPHONE ENCOUNTER
To nursing, please tell patient blood and urine tests done today--results are all okay. Thanks. Note to self–  9/13/21–CBC CMP TSH UA A1c–okay A1c 5.8.

## 2021-09-13 NOTE — TELEPHONE ENCOUNTER
Pt came to --asks for lab orders today--has appt today. .   Lab orders entered for cbc cmp tsh A1c ua. He notes Dr Rm Pastor does lipid order. 1. Prediabetes  - HEMOGLOBIN A1C; Future    2.  Hypercholesterolemia  - CBC WITH DIFFERENTIAL WITH INDRA

## 2021-09-22 ENCOUNTER — OFFICE VISIT (OUTPATIENT)
Dept: PHYSICAL MEDICINE AND REHAB | Facility: CLINIC | Age: 81
End: 2021-09-22
Payer: MEDICARE

## 2021-09-22 VITALS
SYSTOLIC BLOOD PRESSURE: 120 MMHG | HEIGHT: 67 IN | DIASTOLIC BLOOD PRESSURE: 60 MMHG | WEIGHT: 149 LBS | BODY MASS INDEX: 23.39 KG/M2

## 2021-09-22 DIAGNOSIS — M51.26 LUMBAR HERNIATED DISC: Primary | ICD-10-CM

## 2021-09-22 DIAGNOSIS — M43.16 SPONDYLOLISTHESIS OF LUMBAR REGION: ICD-10-CM

## 2021-09-22 DIAGNOSIS — M48.061 SPINAL STENOSIS OF LUMBAR REGION WITHOUT NEUROGENIC CLAUDICATION: ICD-10-CM

## 2021-09-22 DIAGNOSIS — M51.9 LUMBAR DISC DISEASE: ICD-10-CM

## 2021-09-22 DIAGNOSIS — M54.16 LUMBAR RADICULOPATHY: ICD-10-CM

## 2021-09-22 DIAGNOSIS — M48.061 LUMBAR FORAMINAL STENOSIS: ICD-10-CM

## 2021-09-22 PROCEDURE — 99214 OFFICE O/P EST MOD 30 MIN: CPT | Performed by: PHYSICAL MEDICINE & REHABILITATION

## 2021-09-22 NOTE — PATIENT INSTRUCTIONS
Plan  He will get back into the PT with [place]. I will perform right L3 and L5 TFESI(s) if the PT does not help. He will pace himself with his activities. He will follow up in 2-3 months or sooner if needed.

## 2021-09-22 NOTE — PROGRESS NOTES
Low Back Pain H & P    Chief Complaint: Patient presents with:  Low Back Pain: c/o discomfort right side buttocks and right leg with sitting long period of time. x 6 months. rates pain 4/10  takes advil or advil pm as needed for pain.      Nursing note rev colonoscopy 6/11/12    cscopy Dr Albert Stout -no recurrent polyps, sigmoid diverticulosis and int hemorrhoids.     • Hearing loss     hearing aides   • Herpes labialis    • Hiatal hernia 1999    EGD 1999, 2002, 2007  Dr Albert Stout    • High cholesterol    • Hyperl History   Family History   Problem Relation Age of Onset   • Cancer Father          age 67 lung cancer   • Stroke Father          age 67   • Breast Cancer Mother 50   • Dementia Mother          age 80 pneumonia   • Other (Other[other]) Mother does not use an assistive device. .        The patient does live in a home with stairs.     Social Determinants of Health  Financial Resource Strain:       Difficulty of Paying Living Expenses: Not on file  Food Insecurity:       Worried About Running Out of 120/60       Gait:    Gait: Normal gait   Sit to Stand: no difficulty      Lumbar Spine:    Scoliosis: Thoracic dextroscoliosis that is mild and Lumbar levoscoliosis that is mild     Lumbar Spine Palpation:    Spinous Processes: Non-tender for all Spinous

## 2021-10-04 ENCOUNTER — APPOINTMENT (OUTPATIENT)
Dept: PHYSICAL THERAPY | Facility: HOSPITAL | Age: 81
End: 2021-10-04
Attending: PHYSICAL MEDICINE & REHABILITATION
Payer: MEDICARE

## 2021-10-05 ENCOUNTER — OFFICE VISIT (OUTPATIENT)
Dept: PHYSICAL THERAPY | Facility: HOSPITAL | Age: 81
End: 2021-10-05
Attending: PHYSICAL MEDICINE & REHABILITATION
Payer: MEDICARE

## 2021-10-05 PROCEDURE — 97162 PT EVAL MOD COMPLEX 30 MIN: CPT

## 2021-10-05 PROCEDURE — 97112 NEUROMUSCULAR REEDUCATION: CPT

## 2021-10-05 PROCEDURE — 97110 THERAPEUTIC EXERCISES: CPT

## 2021-10-05 NOTE — PROGRESS NOTES
LUMBAR SPINE EVALUATION:   Estelle Daly    1/29/1940  Referring Physician:  Clarice Swain  Diagnosis: Lumbar herniated disc (M51.26)  Lumbar foraminal stenosis (M48.061)  Spinal stenosis of lumbar region without neurogenic claudication (M48.061)  Julieta Fowler taboganning   • Mild CAD 2020    Cardiac cath 8/4/20 Dr Salguero--50% LAD stenosis. medical mgmt.    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    Prior treatments: PT w success  Recent accidents or falls: luis enrique Sands describes to bedroom, and basement, does 6-13 flights/day  Unexplained wt loss: no  Increased pain w coughing, sneezing, straining in the bathroom: no  Blood thinners: no  Diabetes: no  Latex Allergy: no  Pacemaker: no    Are you being hurt, frightened, demeaned, or Knee ext L 5   L4  ankle DF R 4+   Ankle DF L 5   L5 – EHL R 4+   EHL L 5   S1 –ankle PF R 5   Ankle PF L 5   Hams R 5   Hams L 5   *pain limiting    LE flexibility 10/5/2021   Piriformis L Mod-max   Piriformis R mod   Hams L -42   Hams R -53   *pain muniz or debris suggested within the gallbladder lumen. Scattered colonic diverticula are present in the sigmoid colon. There is no colonic wall thickening or pericolonic fat stranding.  There are well-circumscribed T2 hyperintense   probable peripelvic cysts of by (CST): Herminia Garcia MD on 1/10/2018 at 13:33       ASSESSMENT:     Physical Therapy Goals: From 10/5/2021 to 1/3/2022  - Created with patient input during initial evaluation   1.  Pt will be independent in beginning level of HEP for stretching, postur Date____________________    Certification From: 30/9/7423  To:1/3/2022        __________________________________________________________________________________________      Treatment performed this date:    Therapeutic Exercise:  Visit #   1/8   Position

## 2021-10-08 ENCOUNTER — IMMUNIZATION (OUTPATIENT)
Dept: INTERNAL MEDICINE CLINIC | Facility: CLINIC | Age: 81
End: 2021-10-08
Payer: MEDICARE

## 2021-10-08 DIAGNOSIS — Z23 NEED FOR VACCINATION: Primary | ICD-10-CM

## 2021-10-08 PROCEDURE — G0008 ADMIN INFLUENZA VIRUS VAC: HCPCS | Performed by: INTERNAL MEDICINE

## 2021-10-08 PROCEDURE — 90662 IIV NO PRSV INCREASED AG IM: CPT | Performed by: INTERNAL MEDICINE

## 2021-10-15 ENCOUNTER — TELEPHONE (OUTPATIENT)
Dept: PHYSICAL THERAPY | Facility: HOSPITAL | Age: 81
End: 2021-10-15

## 2021-10-21 ENCOUNTER — OFFICE VISIT (OUTPATIENT)
Dept: PHYSICAL THERAPY | Facility: HOSPITAL | Age: 81
End: 2021-10-21
Attending: PHYSICAL MEDICINE & REHABILITATION
Payer: MEDICARE

## 2021-10-21 PROCEDURE — 97110 THERAPEUTIC EXERCISES: CPT

## 2021-10-21 PROCEDURE — 97112 NEUROMUSCULAR REEDUCATION: CPT

## 2021-10-21 NOTE — PROGRESS NOTES
Scottie [facility]    1/29/1940  Referring Physician:  Eboni Buitrago  Diagnosis: Lumbar herniated disc (M51.26)  Lumbar foraminal stenosis (M48.061)  Spinal stenosis of lumbar region without neurogenic claudication (M48.061)  Lumbar disc disease (M51.9)  S Lateral border of foot L (S1) wnl   Popliteal fossa R (S2) wnl   Popliteal fossa L(S2) wnl       Abdominals 10/5/2021   Tone  good     Lumbopelvic 10/5/2021   Control  Poor due to lack of mobility        Neural mobility 10/5/2021   SLR R 60 deg (+)   SLR stretching, posture and strengthening. 2. Pt will be able to walk his dog am and pm without increased symptoms. 3. Pt will be able to sit to drive for 60 min without increased symptoms. 4. Pt will be able to use his HEP to relieve his symptoms.   5. Pt therapist to discuss.

## 2021-10-27 ENCOUNTER — TELEPHONE (OUTPATIENT)
Dept: PHYSICAL THERAPY | Facility: HOSPITAL | Age: 81
End: 2021-10-27

## 2021-11-02 ENCOUNTER — APPOINTMENT (OUTPATIENT)
Dept: PHYSICAL THERAPY | Facility: HOSPITAL | Age: 81
End: 2021-11-02
Attending: PHYSICAL MEDICINE & REHABILITATION
Payer: MEDICARE

## 2021-11-03 ENCOUNTER — TELEPHONE (OUTPATIENT)
Dept: PHYSICAL THERAPY | Facility: HOSPITAL | Age: 81
End: 2021-11-03

## 2021-11-09 ENCOUNTER — APPOINTMENT (OUTPATIENT)
Dept: PHYSICAL THERAPY | Facility: HOSPITAL | Age: 81
End: 2021-11-09
Attending: PHYSICAL MEDICINE & REHABILITATION
Payer: MEDICARE

## 2021-11-12 ENCOUNTER — OFFICE VISIT (OUTPATIENT)
Dept: PHYSICAL THERAPY | Facility: HOSPITAL | Age: 81
End: 2021-11-12
Attending: PHYSICAL MEDICINE & REHABILITATION
Payer: MEDICARE

## 2021-11-12 DIAGNOSIS — M48.061 SPINAL STENOSIS OF LUMBAR REGION WITHOUT NEUROGENIC CLAUDICATION: ICD-10-CM

## 2021-11-12 DIAGNOSIS — M54.16 LUMBAR RADICULOPATHY: ICD-10-CM

## 2021-11-12 DIAGNOSIS — M51.9 LUMBAR DISC DISEASE: ICD-10-CM

## 2021-11-12 DIAGNOSIS — M48.061 LUMBAR FORAMINAL STENOSIS: ICD-10-CM

## 2021-11-12 DIAGNOSIS — M51.26 LUMBAR HERNIATED DISC: ICD-10-CM

## 2021-11-12 DIAGNOSIS — M43.16 SPONDYLOLISTHESIS OF LUMBAR REGION: ICD-10-CM

## 2021-11-12 PROCEDURE — 97112 NEUROMUSCULAR REEDUCATION: CPT

## 2021-11-12 PROCEDURE — 97110 THERAPEUTIC EXERCISES: CPT

## 2021-11-12 NOTE — PROGRESS NOTES
Natalia Peoples    1/29/1940  Referring Physician:  Aminta Staples  Diagnosis: Lumbar herniated disc (M51.26)  Lumbar foraminal stenosis (M48.061)  Spinal stenosis of lumbar region without neurogenic claudication (M48.061)  Lumbar disc disease (M51.9)  S wnl   Lateral border of foot R (S1) wnl   Lateral border of foot L (S1) wnl   Popliteal fossa R (S2) wnl   Popliteal fossa L(S2) wnl       Abdominals 10/5/2021   Tone  good     Lumbopelvic 10/5/2021   Control  Poor due to lack of mobility        Neural mob strengthening. 2. Pt will be able to walk his dog am and pm without increased symptoms. 3. Pt will be able to sit to drive for 60 min without increased symptoms. 4. Pt will be able to use his HEP to relieve his symptoms.   5. Pt will be able to play 9 h sitting or standing. Lumbar rolls   X use of Lee Ann SuperRoll incar. Use of contour roll in deep chairs like the golf cart. H = HEP.  Pt given copies of this exercise for home program.  X = Exercises done this date - pt verbalized understanding a

## 2021-11-16 ENCOUNTER — OFFICE VISIT (OUTPATIENT)
Dept: PHYSICAL THERAPY | Facility: HOSPITAL | Age: 81
End: 2021-11-16
Attending: PHYSICAL MEDICINE & REHABILITATION
Payer: MEDICARE

## 2021-11-16 DIAGNOSIS — M43.16 SPONDYLOLISTHESIS OF LUMBAR REGION: ICD-10-CM

## 2021-11-16 DIAGNOSIS — M51.26 LUMBAR HERNIATED DISC: ICD-10-CM

## 2021-11-16 DIAGNOSIS — M51.9 LUMBAR DISC DISEASE: ICD-10-CM

## 2021-11-16 DIAGNOSIS — M54.16 LUMBAR RADICULOPATHY: ICD-10-CM

## 2021-11-16 DIAGNOSIS — M48.061 LUMBAR FORAMINAL STENOSIS: ICD-10-CM

## 2021-11-16 DIAGNOSIS — M48.061 SPINAL STENOSIS OF LUMBAR REGION WITHOUT NEUROGENIC CLAUDICATION: ICD-10-CM

## 2021-11-16 PROCEDURE — 97110 THERAPEUTIC EXERCISES: CPT

## 2021-11-16 PROCEDURE — 97112 NEUROMUSCULAR REEDUCATION: CPT

## 2021-11-16 NOTE — PROGRESS NOTES
Yu Hatchet    1/29/1940  Referring Physician:  Teo Garrido  Diagnosis: Lumbar herniated disc (M51.26)  Lumbar foraminal stenosis (M48.061)  Spinal stenosis of lumbar region without neurogenic claudication (M48.061)  Lumbar disc disease (M51.9)  S L(S2) wnl       Abdominals 10/5/2021   Tone  good     Lumbopelvic 10/5/2021   Control  Poor due to lack of mobility        Neural mobility 10/5/2021   SLR R 60 deg (+)   SLR L 60 deg myofascial only      ELISSA 10/5/2021   R Min ROM loss, no pain   L Mod RO strengthening. 2. Pt will be able to walk his dog am and pm without increased symptoms. 3. Pt will be able to sit to drive for 60 min without increased symptoms. 4. Pt will be able to use his HEP to relieve his symptoms.   5. Pt will be able to play 9 h from either prolonged sitting or standing. X see assessment    Lumbar rolls  X use of Lee Ann SuperRoll incar. Use of contour roll in deep chairs like the golf cart. H = HEP.  Pt given copies of this exercise for home program.  X = Exercises done th

## 2021-11-19 ENCOUNTER — OFFICE VISIT (OUTPATIENT)
Dept: PHYSICAL THERAPY | Facility: HOSPITAL | Age: 81
End: 2021-11-19
Attending: PHYSICAL MEDICINE & REHABILITATION
Payer: MEDICARE

## 2021-11-19 DIAGNOSIS — M51.26 LUMBAR HERNIATED DISC: ICD-10-CM

## 2021-11-19 DIAGNOSIS — M54.16 LUMBAR RADICULOPATHY: ICD-10-CM

## 2021-11-19 DIAGNOSIS — M48.061 SPINAL STENOSIS OF LUMBAR REGION WITHOUT NEUROGENIC CLAUDICATION: ICD-10-CM

## 2021-11-19 DIAGNOSIS — M43.16 SPONDYLOLISTHESIS OF LUMBAR REGION: ICD-10-CM

## 2021-11-19 DIAGNOSIS — M51.9 LUMBAR DISC DISEASE: ICD-10-CM

## 2021-11-19 DIAGNOSIS — M48.061 LUMBAR FORAMINAL STENOSIS: ICD-10-CM

## 2021-11-19 PROCEDURE — 97112 NEUROMUSCULAR REEDUCATION: CPT

## 2021-11-19 PROCEDURE — 97110 THERAPEUTIC EXERCISES: CPT

## 2021-11-19 NOTE — PROGRESS NOTES
Alexa Ibarra    1/29/1940  Referring Physician:  Kera Velez  Diagnosis: Lumbar herniated disc (M51.26)  Lumbar foraminal stenosis (M48.061)  Spinal stenosis of lumbar region without neurogenic claudication (M48.061)  Lumbar disc disease (M51.9)  S fossa L(S2) wnl       Abdominals 10/5/2021   Tone  good     Lumbopelvic 10/5/2021   Control  Poor due to lack of mobility        Neural mobility 10/5/2021   SLR R 60 deg (+)   SLR L 60 deg myofascial only      ELISSA 10/5/2021   R Min ROM loss, no pain   L Pt will be able to use his HEP to relieve his symptoms. 5. Pt will be able to play 9 holes of golf 2 days/week without increased symptoms. PLAN OF CARE:      Assess response to hip IR self mob.   Continue PT per original plan for therapeutic exercises, program.  X = Exercises done this date - pt verbalized understanding and demonstrated competence. All exercises done B unless otherwise indicated. Pt advised to discontinue exercises that increase pain and to call or return to therapist to discuss.

## 2021-11-23 ENCOUNTER — APPOINTMENT (OUTPATIENT)
Dept: PHYSICAL THERAPY | Facility: HOSPITAL | Age: 81
End: 2021-11-23
Attending: PHYSICAL MEDICINE & REHABILITATION
Payer: MEDICARE

## 2021-11-30 ENCOUNTER — OFFICE VISIT (OUTPATIENT)
Dept: PHYSICAL THERAPY | Facility: HOSPITAL | Age: 81
End: 2021-11-30
Attending: PHYSICAL MEDICINE & REHABILITATION
Payer: MEDICARE

## 2021-11-30 DIAGNOSIS — M54.16 LUMBAR RADICULOPATHY: ICD-10-CM

## 2021-11-30 DIAGNOSIS — M51.9 LUMBAR DISC DISEASE: ICD-10-CM

## 2021-11-30 DIAGNOSIS — M48.061 LUMBAR FORAMINAL STENOSIS: ICD-10-CM

## 2021-11-30 DIAGNOSIS — M51.26 LUMBAR HERNIATED DISC: ICD-10-CM

## 2021-11-30 DIAGNOSIS — M48.061 SPINAL STENOSIS OF LUMBAR REGION WITHOUT NEUROGENIC CLAUDICATION: ICD-10-CM

## 2021-11-30 DIAGNOSIS — M43.16 SPONDYLOLISTHESIS OF LUMBAR REGION: ICD-10-CM

## 2021-11-30 PROCEDURE — 97110 THERAPEUTIC EXERCISES: CPT

## 2021-11-30 PROCEDURE — 97112 NEUROMUSCULAR REEDUCATION: CPT

## 2021-11-30 PROCEDURE — 97116 GAIT TRAINING THERAPY: CPT

## 2021-11-30 NOTE — PROGRESS NOTES
Nora Anderson    1/29/1940  Referring Physician:  Abhay Gao  Diagnosis: Lumbar herniated disc (M51.26)  Lumbar foraminal stenosis (M48.061)  Spinal stenosis of lumbar region without neurogenic claudication (M48.061)  Lumbar disc disease (M51.9)  S R (S2) wnl   Popliteal fossa L(S2) wnl       Abdominals 10/5/2021   Tone  good     Lumbopelvic 10/5/2021   Control  Poor due to lack of mobility        Neural mobility 10/5/2021   SLR R 60 deg (+)   SLR L 60 deg myofascial only      ELISSA 10/5/2021   R Min increased symptoms. 4. Pt will be able to use his HEP to relieve his symptoms. 5. Pt will be able to play 9 holes of golf 2 days/week without increased symptoms. PLAN OF CARE:      Assess response to hip flexor stretching and corrected stance.   Jessika Jiménez Neuro Re-ed  X see assessment X see assessment X see assessment    Lumbar rolls        Ergonomics    X stepping foot inside cabinet under sink for lumbar support   H = HEP.  Pt given copies of this exercise for home program.  X = Exercises done this date

## 2021-12-07 ENCOUNTER — APPOINTMENT (OUTPATIENT)
Dept: PHYSICAL THERAPY | Facility: HOSPITAL | Age: 81
End: 2021-12-07
Attending: PHYSICAL MEDICINE & REHABILITATION
Payer: MEDICARE

## 2021-12-08 ENCOUNTER — OFFICE VISIT (OUTPATIENT)
Dept: PHYSICAL MEDICINE AND REHAB | Facility: CLINIC | Age: 81
End: 2021-12-08
Payer: MEDICARE

## 2021-12-08 VITALS
SYSTOLIC BLOOD PRESSURE: 132 MMHG | HEART RATE: 71 BPM | BODY MASS INDEX: 23.39 KG/M2 | HEIGHT: 67 IN | WEIGHT: 149 LBS | OXYGEN SATURATION: 97 % | DIASTOLIC BLOOD PRESSURE: 58 MMHG

## 2021-12-08 DIAGNOSIS — M51.9 LUMBAR DISC DISEASE: ICD-10-CM

## 2021-12-08 DIAGNOSIS — M48.061 LUMBAR FORAMINAL STENOSIS: ICD-10-CM

## 2021-12-08 DIAGNOSIS — M19.042 PRIMARY OSTEOARTHRITIS OF LEFT HAND: ICD-10-CM

## 2021-12-08 DIAGNOSIS — M79.642 LEFT HAND PAIN: ICD-10-CM

## 2021-12-08 DIAGNOSIS — M43.16 SPONDYLOLISTHESIS OF LUMBAR REGION: ICD-10-CM

## 2021-12-08 DIAGNOSIS — M48.061 SPINAL STENOSIS OF LUMBAR REGION WITHOUT NEUROGENIC CLAUDICATION: ICD-10-CM

## 2021-12-08 DIAGNOSIS — M54.16 LUMBAR RADICULOPATHY: Primary | ICD-10-CM

## 2021-12-08 DIAGNOSIS — M51.26 LUMBAR HERNIATED DISC: ICD-10-CM

## 2021-12-08 PROCEDURE — 99214 OFFICE O/P EST MOD 30 MIN: CPT | Performed by: PHYSICAL MEDICINE & REHABILITATION

## 2021-12-08 NOTE — PROGRESS NOTES
Low Back Pain H & P    Chief Complaint: Patient presents with: Follow - Up: LOV: (09/22/21). Pt. is here for f/u of lower back pain. States having significant imrpovement after PT. Continues having discomfot with sciatica.  States having occasional pain wi Harsh Dill 2007 and 2013   • Coronary atherosclerosis    • Diverticulosis     seen on colonoscopy   • GERD (gastroesophageal reflux disease)     EGD 10/30/17 Dr Harsh Dill at [facility]--poss short segment barretts--bx however neg.     • H/O colonoscopy 6/11/1 OTHER SURGICAL HISTORY  1980    cystoscopy to remove kidney stones -[facility]    • C/Amrit Recinos Final  2000    prostatectomy for prostate cancer Dr. Nicole Easley at [facility]    age 13       Family History   Family History Special Diet: Not Asked        Back Care: Not Asked        Exercise: Yes          walking, golfing        Bike Helmet: Not Asked        Seat Belt: Not Asked        Self-Exams: Not Asked    Social History Narrative      The patient does not use an assistive Processes   Z-joints: Non-tender for all Z-joints   SIJ: Non-tender for bilateral SIJ   Piriformis Muscle: Non-tender bilateral Piriformis muscles   Greater Trochanteric Bursa: Non-tender for bilateral Greater Trochanteric Bursa     Vascular lower extremit

## 2021-12-08 NOTE — PATIENT INSTRUCTIONS
Plan  He will finish off the PT and he will continue with the home exercise program.    He will follow up in 4-5 months. He will continue with the Advil as needed for the pain. The patient does not need any injections at this time.     He will do OT

## 2021-12-09 ENCOUNTER — TELEPHONE (OUTPATIENT)
Dept: PHYSICAL THERAPY | Facility: HOSPITAL | Age: 81
End: 2021-12-09

## 2021-12-09 NOTE — TELEPHONE ENCOUNTER
Appointment canceled for Caitie Floresxon ([id number])   Visit Type: [facility] PT ORTHO [place]   Date        Time      Length    Provider                  Department   12/14/2021   2:00 PM  45 mins. Ned Diaz PT         [facility] NEUROSCIENCE PT      Reason for Cancell

## 2021-12-13 ENCOUNTER — OFFICE VISIT (OUTPATIENT)
Dept: PHYSICAL THERAPY | Facility: HOSPITAL | Age: 81
End: 2021-12-13
Attending: PHYSICAL MEDICINE & REHABILITATION
Payer: MEDICARE

## 2021-12-13 PROCEDURE — 97110 THERAPEUTIC EXERCISES: CPT

## 2021-12-13 PROCEDURE — 97112 NEUROMUSCULAR REEDUCATION: CPT

## 2021-12-14 ENCOUNTER — APPOINTMENT (OUTPATIENT)
Dept: PHYSICAL THERAPY | Facility: HOSPITAL | Age: 81
End: 2021-12-14
Attending: PHYSICAL MEDICINE & REHABILITATION
Payer: MEDICARE

## 2021-12-15 ENCOUNTER — TELEPHONE (OUTPATIENT)
Dept: PHYSICAL MEDICINE AND REHAB | Facility: CLINIC | Age: 81
End: 2021-12-15

## 2021-12-15 DIAGNOSIS — M18.12 PRIMARY OSTEOARTHRITIS OF FIRST CARPOMETACARPAL JOINT OF LEFT HAND: Primary | ICD-10-CM

## 2021-12-15 DIAGNOSIS — M79.642 LEFT HAND PAIN: ICD-10-CM

## 2021-12-15 NOTE — TELEPHONE ENCOUNTER
Per Annemarie Campos at [facility] 12/8/21: \"He will think about doing platelet rich plasma injections into the left 1st [facility] joint. \"    Will verify order with Annemarie Campos.  Once order is placed will reach out to patient once insurance information is received from referral

## 2021-12-15 NOTE — TELEPHONE ENCOUNTER
Per Medicare Guidelines -PRP is considered Experimental/Investigational  Platelet Pich Plasma injections into the left 1st [facility] joint  CPT 0232T   Status: DENIED-NOT A Covered Benefit     Patient notified via [employer]

## 2021-12-15 NOTE — TELEPHONE ENCOUNTER
Ok to place order per . Once insurance information is received will reach out to patient to schedule.

## 2021-12-15 NOTE — TELEPHONE ENCOUNTER
pt will like to have a platelet rich plasma injections . Pt also said dr. Nadia Molina knows about this inj. Please assist

## 2021-12-19 NOTE — PROGRESS NOTES
Tez Glass    1/29/1940  Referring Physician:  Ardie Angelucci  Diagnosis: Lumbar herniated disc (M51.26)  Lumbar foraminal stenosis (M48.061)  Spinal stenosis of lumbar region without neurogenic claudication (M48.061)  Lumbar disc disease (M51.9)  S Lateral border of foot R (S1) wnl   Lateral border of foot L (S1) wnl   Popliteal fossa R (S2) wnl   Popliteal fossa L(S2) wnl       Abdominals 10/5/2021   Tone  good     Lumbopelvic 10/5/2021   Control  Poor due to lack of mobility        Neural mobilit able to walk his dog am and pm without increased symptoms. 3. Pt will be able to sit to drive for 60 min without increased symptoms. 4. Pt will be able to use his HEP to relieve his symptoms.   5. Pt will be able to play 9 holes of golf 2 days/week withou tandem stance eyes open        Corner tandem stance eyes closed H      Gait Walking sticks education         Patterning   X importance of hip extn neutral in stance and gait.     Neuro Re-ed  X see assessment X see assessment X see assessment    Lumbar roll

## 2021-12-23 NOTE — TELEPHONE ENCOUNTER
Pt came to office to request appt for PRP. Patient taking 81 mg aspirin being prescribed by Dr. Renu Dooley. 8 Liya Alston letter faxed to [phone]. Will contact pt once approval obtained.

## 2022-01-03 NOTE — TELEPHONE ENCOUNTER
Patient was advised on to stop Nsaids 2 wks prior to injection and 4wks after. [id number] 5 days prior. Denies blood thinner. Patient knows to bring sling and possible . Arrive an hour early and self pay $705.

## 2022-01-13 ENCOUNTER — TELEPHONE (OUTPATIENT)
Dept: PHYSICAL MEDICINE AND REHAB | Facility: CLINIC | Age: 82
End: 2022-01-13

## 2022-01-13 NOTE — TELEPHONE ENCOUNTER
\"No pci may hold aspirin for 5 days as requested, resume as soon as possible. \" Per Lopez Wiggins RN. Pt scheduled for 1/18/22. See telephone encounter 12/15/21  Approval letter submitted for scanning.

## 2022-01-13 NOTE — TELEPHONE ENCOUNTER
S/w pt, had questions about level of pain to expect, need for brace, working w/OT. Questions answered, pt verbalized understanding and was appreciative of call.

## 2022-01-13 NOTE — TELEPHONE ENCOUNTER
Patient left message on VM stating he has questions regarding his upcoming PRP injection that is scheduled for 1/18/22. Patient requesting call back.

## 2022-01-17 NOTE — TELEPHONE ENCOUNTER
Patient left message on  stating he has an injection scheduled for tomorrow and wants to know how the day is going to go and what to expect. States he knows he has to arrive 1 hour beforehand and that is it. Patient requesting a call back.

## 2022-01-17 NOTE — TELEPHONE ENCOUNTER
Spoke with patient and informed him that  wasn't required as he is not receiving any sedation; however, it may be difficult to drive due to the location of the injection and also depending on pain.     Patient understood and stated he will be driving

## 2022-01-18 ENCOUNTER — OFFICE VISIT (OUTPATIENT)
Dept: PHYSICAL MEDICINE AND REHAB | Facility: CLINIC | Age: 82
End: 2022-01-18

## 2022-01-18 DIAGNOSIS — M19.042 PRIMARY OSTEOARTHRITIS OF LEFT HAND: ICD-10-CM

## 2022-01-18 DIAGNOSIS — M79.642 LEFT HAND PAIN: Primary | ICD-10-CM

## 2022-01-18 PROCEDURE — 0232T NJX PLATELET PLASMA: CPT | Performed by: PHYSICAL MEDICINE & REHABILITATION

## 2022-01-19 NOTE — PROCEDURES
The patient is here for a left 1st [facility] joint injection with pure PRP done under ultrasound guidance.     Venipuncture was performed and 30 ml of whole blood was aspirated and then a double spin protocol was performed with the first spin  platelets

## 2022-01-25 ENCOUNTER — TELEPHONE (OUTPATIENT)
Dept: PHYSICAL MEDICINE AND REHAB | Facility: CLINIC | Age: 82
End: 2022-01-25

## 2022-01-25 ENCOUNTER — MED REC SCAN ONLY (OUTPATIENT)
Dept: PHYSICAL MEDICINE AND REHAB | Facility: CLINIC | Age: 82
End: 2022-01-25

## 2022-01-25 RX ORDER — LIDOCAINE HYDROCHLORIDE 10 MG/ML
3 INJECTION, SOLUTION INFILTRATION; PERINEURAL ONCE
Status: COMPLETED | OUTPATIENT
Start: 2022-01-25 | End: 2022-01-25

## 2022-01-25 NOTE — TELEPHONE ENCOUNTER
S/W patient for condition update. Patient had BRYAN on Tuesday of last week 01/18/22. Patient states he had no improvement from injection. Patient rates pain 2-4/10 and more of discomfort.  Patient state he is still wearing both braces and states 0% improveme

## 2022-01-26 NOTE — TELEPHONE ENCOUNTER
If he can hold the ASA for one more week that would be great. The procedure will take 4-12 weeks to reach its full affect. He will need to follow up 4 weeks from the injection.

## 2022-01-26 NOTE — TELEPHONE ENCOUNTER
PRP done 1/18/22. NOV 2/15/22. Left message for patient with information from Dr Swain`s note 1/26/22. Asked patient to call office back.  Advised Dr Piter Noyola would like him to hold aspirin for 1 more week if ok with cardiologist.

## 2022-01-31 ENCOUNTER — OFFICE VISIT (OUTPATIENT)
Dept: OCCUPATIONAL MEDICINE | Facility: HOSPITAL | Age: 82
End: 2022-01-31
Attending: PHYSICAL MEDICINE & REHABILITATION
Payer: MEDICARE

## 2022-01-31 DIAGNOSIS — M79.642 LEFT HAND PAIN: ICD-10-CM

## 2022-01-31 DIAGNOSIS — M19.042 PRIMARY OSTEOARTHRITIS OF LEFT HAND: ICD-10-CM

## 2022-01-31 PROCEDURE — 97166 OT EVAL MOD COMPLEX 45 MIN: CPT | Performed by: OCCUPATIONAL THERAPIST

## 2022-01-31 PROCEDURE — 97110 THERAPEUTIC EXERCISES: CPT | Performed by: OCCUPATIONAL THERAPIST

## 2022-01-31 NOTE — PROGRESS NOTES
OCCUPATIONAL THERAPY UPPER EXTREMITY EVALUATION:   Referring Physician: Dr. Dave Klein  Date of onset: 2020  Diagnosis: Primary osteoarthritis of left hand (Z11.681)  Left hand pain (Y63.214)   Date of Service: 01/31/22     PATIENT SUMMARY:   Jackie mccann a of left thumb is negative. Observed some arthritic changes to left thumb, prominent MP joint.  Given the above noted deficits in ROM, pain,and strength, patient presents with impairments in occupation- based task performance for self care skills, home maint Ther ex                   Thumb extension x10                 Thumb MP flexion x10                  thumb radial abduction  x10                  thumb palmar abduction  x10 T    Physician's certification required: Yes  I certify the need for these services furnished under this plan of treatment and while under my care.         X______________________________________________ Date________________  Certification From: 91/04/66

## 2022-02-03 ENCOUNTER — OFFICE VISIT (OUTPATIENT)
Dept: OCCUPATIONAL MEDICINE | Facility: HOSPITAL | Age: 82
End: 2022-02-03
Attending: PHYSICAL MEDICINE & REHABILITATION
Payer: MEDICARE

## 2022-02-03 PROCEDURE — 97110 THERAPEUTIC EXERCISES: CPT | Performed by: OCCUPATIONAL THERAPIST

## 2022-02-03 PROCEDURE — 97140 MANUAL THERAPY 1/> REGIONS: CPT | Performed by: OCCUPATIONAL THERAPIST

## 2022-02-03 PROCEDURE — 97018 PARAFFIN BATH THERAPY: CPT | Performed by: OCCUPATIONAL THERAPIST

## 2022-02-03 NOTE — PROGRESS NOTES
Dx:  Primary osteoarthritis of left hand (S55.103)  Left hand pain (R28.938)   Authorized # of Visits:  6         Next MD visit: none scheduled  Fall Risk: standard         Precautions: Cancer       Medication Changes since last visit?: No  Subjective: \"I've been wearing the brace 90% of time, pain has been about 0-2/10. \"    Objective: Treatment began with paraffin follow by STM, AROM, light strengthening. See flow sheet for details. Date 01/31/22 02/03/22               Visit # 1  2                                  Evaluation x                Manual                   STM along EPB tendon and muscle   8 min                                                       Ther ex                   Thumb extension x10  x10               Thumb MP flexion x10  x10                thumb radial abduction  x10  x10                thumb palmar abduction  x10  x10               Velcro checkers two point pinch,     Three point pinch to pinch green CP  40 checkers    40 checkers          thumb rubber band resistive exercises extension and abduction    x10 each                isometric thumb extension     3 sec x 10                red putty, composite flexion  potato masher, IP , two point pinch     6 min               HEP instruction                       see below                 Therapeutic Activity                                       Neuromuscular Re-education                                                             Modalities                    paraffin     5 min                                             Assessment: Patient reporting some pain in dorsal thumb MP joint  with resisted rubber band and putty exercises, pain short lived. Reviewed and issued isometric thumb extension and flexion exercises. Pt voiced understanding and performs HEP correctly without reported pain. Goals:   Pt complaints of pain in left thumb MP will decrease at worst to 1/10.   Pt will be independent and compliant with comprehensive HEP to maintain progress achieved in OT. Patient will demonstrate increase in left thumb MP flexion to at least 50 degrees for ease in holding tool. Patient will demonstrate increase in left three point pinch strength to least 14 lbs for ease in opening jar. Plan: Continue to work towards pain reduction, strengthening and increase AROM.     Charges: MT,TE2,P  Total Timed Treatment: 40 min  Total Treatment Time: 45 min

## 2022-02-08 ENCOUNTER — APPOINTMENT (OUTPATIENT)
Dept: OCCUPATIONAL MEDICINE | Facility: HOSPITAL | Age: 82
End: 2022-02-08
Attending: PHYSICAL MEDICINE & REHABILITATION
Payer: MEDICARE

## 2022-02-10 ENCOUNTER — OFFICE VISIT (OUTPATIENT)
Dept: OCCUPATIONAL MEDICINE | Facility: HOSPITAL | Age: 82
End: 2022-02-10
Attending: PHYSICAL MEDICINE & REHABILITATION
Payer: MEDICARE

## 2022-02-10 PROCEDURE — 97140 MANUAL THERAPY 1/> REGIONS: CPT | Performed by: OCCUPATIONAL THERAPIST

## 2022-02-10 PROCEDURE — 97110 THERAPEUTIC EXERCISES: CPT | Performed by: OCCUPATIONAL THERAPIST

## 2022-02-10 PROCEDURE — 97018 PARAFFIN BATH THERAPY: CPT | Performed by: OCCUPATIONAL THERAPIST

## 2022-02-10 NOTE — PROGRESS NOTES
Dx:  Primary osteoarthritis of left hand (J36.895)  Left hand pain (T29.188)   Authorized # of Visits:  6         Next MD visit: none scheduled  Fall Risk: standard         Precautions: Cancer       Medication Changes since last visit?: No  Subjective: \"I was able to lift a pipe from the top of my car and put it away without feeling much pain in my left thumb. \"    Objective: Treatment began with paraffin follow by STM, AROM, light strengthening. See flow sheet for details. Date 01/31/22  02/03/22  02/10/22             Visit # 1  2  3                                Evaluation x                Manual                   STM along EPB tendon and muscle   8 min  8 min                                                     Ther ex                   Thumb extension x10  x10  x10             Thumb MP flexion x10  x10  x10              thumb radial abduction  x10  x10  x10             Gripping exercise, holding 4# ball 5 sec   x10         thumb palmar abduction  x10  x10  x10             Velcro checkers two point pinch,     Three point pinch to pinch green CP  40 checkers    40 checkers Blue web hand/thumb flexor stretches 10 sec x 5         thumb rubber band resistive exercises extension and abduction    x10 each  x15              isometric thumb extension     3 sec x 10  pronation/supination with 3 wt s x 20              red putty, composite flexion  potato masher, IP , two point pinch     6 min  6 min        Three point pinch of green CP to  40 velcro checkers             HEP instruction                       see below                 Therapeutic Activity                                       Neuromuscular Re-education                                                             Modalities                    paraffin     5 min  5 min                                       Assessment: Upgraded resistive thumb exercises today. Patient reporting \"Negligable\" pain in thumb during session.  Patient to be seen once per week given his progress. Goals:   Pt complaints of pain in left thumb MP will decrease at worst to 1/10. Pt will be independent and compliant with comprehensive HEP to maintain progress achieved in OT. Patient will demonstrate increase in left thumb MP flexion to at least 50 degrees for ease in holding tool. Patient will demonstrate increase in left three point pinch strength to least 14 lbs for ease in opening jar. Plan: Continue to work towards pain reduction, strengthening and increase AROM.  Assess  and pinch strength next visit    Charges: MT,TE2,P  Total Timed Treatment: 40 min  Total Treatment Time: 45 min

## 2022-02-14 ENCOUNTER — APPOINTMENT (OUTPATIENT)
Dept: OCCUPATIONAL MEDICINE | Facility: HOSPITAL | Age: 82
End: 2022-02-14
Attending: PHYSICAL MEDICINE & REHABILITATION
Payer: MEDICARE

## 2022-02-15 ENCOUNTER — OFFICE VISIT (OUTPATIENT)
Dept: PHYSICAL MEDICINE AND REHAB | Facility: CLINIC | Age: 82
End: 2022-02-15
Payer: MEDICARE

## 2022-02-15 VITALS
HEIGHT: 67 IN | HEART RATE: 54 BPM | OXYGEN SATURATION: 100 % | SYSTOLIC BLOOD PRESSURE: 134 MMHG | DIASTOLIC BLOOD PRESSURE: 60 MMHG | WEIGHT: 145 LBS | BODY MASS INDEX: 22.76 KG/M2

## 2022-02-15 DIAGNOSIS — M54.12 CERVICAL RADICULOPATHY: ICD-10-CM

## 2022-02-15 DIAGNOSIS — M48.061 SPINAL STENOSIS OF LUMBAR REGION WITHOUT NEUROGENIC CLAUDICATION: ICD-10-CM

## 2022-02-15 DIAGNOSIS — M51.9 LUMBAR DISC DISEASE: ICD-10-CM

## 2022-02-15 DIAGNOSIS — M54.16 LUMBAR RADICULOPATHY: ICD-10-CM

## 2022-02-15 DIAGNOSIS — M48.061 LUMBAR FORAMINAL STENOSIS: ICD-10-CM

## 2022-02-15 DIAGNOSIS — M43.16 SPONDYLOLISTHESIS OF LUMBAR REGION: ICD-10-CM

## 2022-02-15 DIAGNOSIS — M79.642 LEFT HAND PAIN: ICD-10-CM

## 2022-02-15 DIAGNOSIS — R29.898 RIGHT HAND WEAKNESS: ICD-10-CM

## 2022-02-15 DIAGNOSIS — M18.11 ARTHRITIS OF CARPOMETACARPAL (CMC) JOINT OF RIGHT THUMB: Primary | ICD-10-CM

## 2022-02-15 DIAGNOSIS — M19.042 PRIMARY OSTEOARTHRITIS OF LEFT HAND: ICD-10-CM

## 2022-02-15 DIAGNOSIS — M51.26 LUMBAR HERNIATED DISC: ICD-10-CM

## 2022-02-15 PROCEDURE — 99214 OFFICE O/P EST MOD 30 MIN: CPT | Performed by: PHYSICAL MEDICINE & REHABILITATION

## 2022-02-15 NOTE — PATIENT INSTRUCTIONS
Plan  I will do an EMG/NCS of the bilateral arms and hands to assess for the right and weakness and the left shoulder and arm pain. Depending on the results of the above, he might need to have a MRI of the cervical spine. The patient will continue with his home exercise program for the lumbar spine and the left hand. He will finish off the OT for the left hand. The patient does not need any pain medications at this time. She will follow up in 2-3 months or sooner if needed.

## 2022-02-17 ENCOUNTER — APPOINTMENT (OUTPATIENT)
Dept: OCCUPATIONAL MEDICINE | Facility: HOSPITAL | Age: 82
End: 2022-02-17
Attending: PHYSICAL MEDICINE & REHABILITATION
Payer: MEDICARE

## 2022-02-21 ENCOUNTER — APPOINTMENT (OUTPATIENT)
Dept: OCCUPATIONAL MEDICINE | Facility: HOSPITAL | Age: 82
End: 2022-02-21
Attending: PHYSICAL MEDICINE & REHABILITATION
Payer: MEDICARE

## 2022-02-24 ENCOUNTER — OFFICE VISIT (OUTPATIENT)
Dept: OCCUPATIONAL MEDICINE | Facility: HOSPITAL | Age: 82
End: 2022-02-24
Attending: PHYSICAL MEDICINE & REHABILITATION
Payer: MEDICARE

## 2022-02-24 PROCEDURE — 97110 THERAPEUTIC EXERCISES: CPT | Performed by: OCCUPATIONAL THERAPIST

## 2022-02-24 PROCEDURE — 97018 PARAFFIN BATH THERAPY: CPT | Performed by: OCCUPATIONAL THERAPIST

## 2022-02-24 PROCEDURE — 97140 MANUAL THERAPY 1/> REGIONS: CPT | Performed by: OCCUPATIONAL THERAPIST

## 2022-02-24 NOTE — PROGRESS NOTES
Dx:  Primary osteoarthritis of left hand (T47.807)  Left hand pain (M36.504)   Authorized # of Visits:  6         Next MD visit: none scheduled  Fall Risk: standard         Precautions: Cancer       Medication Changes since last visit?: No  Subjective: \"My thumb still hurts on occasion, not too bad, I know what to do so it doesn't get worse. \"    Objective: Treatment began with reevaluation, paraffin follow by STM, AROM, light strengthening. See flow sheet for details.     Date 01/31/22  02/03/22  02/10/22  02/24/22           Visit # 1  2  3  4                              Evaluation x                Manual                   STM along EPB tendon and muscle   8 min  8 min  7 min                    Reevaluation                               Ther ex                   Thumb extension x10  x10  x10  x10           Thumb MP flexion x10  x10  x10  x10            thumb radial abduction  x10  x10  x10  x10           Gripping exercise, holding 4# ball 5 sec   x10 x10        thumb palmar abduction  x10  x10  x10  x10           Velcro checkers two point pinch,     Three point pinch to pinch green CP  40 checkers    40 checkers Blue web hand/thumb flexor stretches 10 sec x 5 Blue web  x 20        thumb rubber band resistive exercises extension and abduction    x10 each  x15  x10, 2 sets            isometric thumb extension     3 sec x 10  pronation/supination with 3 wt s x 20  pronation/supination with 3 wt s x 20            red putty, composite flexion  potato masher, IP , two point pinch     6 min  6 min        Three point pinch of green CP to  40 velcro checkers  6 min        Yellow web digit extension stretches           HEP instruction                       see below                 Therapeutic Activity                                       Neuromuscular Re-education                                                             Modalities                    paraffin     5 min  5 min  5 min Assessment: Patient seen for reevaluation and treatment, making progress in thumb AROM and strength. Tolerated session well, denied pain with gripping and pinch activities. See OT discharge note below for details. Goals: See OT Discharge Note below for details. Plan:  See OT Discharge Note below for details. Charges: MT,TE2,P  Total Timed Treatment: 40 min  Total Treatment Time: 45 min            OT Discharge  Summary    Pt has attended 4, cancelled 0, and no shown 0 visits in Occupational Therapy. Subjective: Patient reports no increase in pain in left thumb, feels ROM and strength is better. Reports pain in thumb 0-310    Assessment: Patient has been seen for 4 OT treatment sessions. Progress has been made in improved left thumb radial abduction (increased by 10 degrees) also improved left  by three pounds,lateral pinch by 2 lbs and three point pinch by 4 lbs. Patient has been complaint with HEP. Patient satisfied with his progress recommend discharge from OT. Objective: See below for objective measurements:    AROM left thumb: MP +30/40, MP +30/60 MAJANO 160 degrees    AROM left thumb:  Palmar abduction: 50 degrees, Radial abduction: 55 degrees    Right  strength AV lbs      Left  strength AV lbs  Right key pinch 16 lbs     Left dumont pinch 14 lbs   Right three point pinch 13 lbs     Left three point pinch 15 lbs      Goals:   Pt complaints of pain in left thumb MP will decrease at worst to 1/10... .(Not Achieved)  Pt will be independent and compliant with comprehensive HEP to maintain progress achieved in OT. .. Remi Gloria (Achieved)  Patient will demonstrate increase in left thumb MP flexion to at least 50 degrees for ease in holding tool. .. .(Not Achieved)  Patient will demonstrate increase in left three point pinch strength to least 14 lbs for ease in opening jar. Remi Gloria .(Achieved)    Rehab Potential: good    Plan: Patient has made good progress toward achieving his goals, requests discharge from OT. Patient was advised of these findings, precautions, and treatment options and has agreed to actively participate in planning and for this course of care. Thank you for your referral. If you have any questions, please contact me at Dept: [phone].     Sincerely,  Electronically signed by therapist: LISY Perez/SISSY, CHT

## 2022-02-28 ENCOUNTER — TELEPHONE (OUTPATIENT)
Dept: INTERNAL MEDICINE CLINIC | Facility: CLINIC | Age: 82
End: 2022-02-28

## 2022-02-28 NOTE — TELEPHONE ENCOUNTER
Pt has appt scheduled for 3/14/22  Pt will go for labs prior  Please call pt when orders in place  He is hoping to go this week  Tasked to nursing

## 2022-03-01 ENCOUNTER — PROCEDURE VISIT (OUTPATIENT)
Dept: PHYSICAL MEDICINE AND REHAB | Facility: CLINIC | Age: 82
End: 2022-03-01
Payer: MEDICARE

## 2022-03-01 DIAGNOSIS — G56.21 CUBITAL TUNNEL SYNDROME ON RIGHT: ICD-10-CM

## 2022-03-01 DIAGNOSIS — M54.12 CERVICAL RADICULOPATHY: Primary | ICD-10-CM

## 2022-03-01 DIAGNOSIS — G56.01 RIGHT CARPAL TUNNEL SYNDROME: ICD-10-CM

## 2022-03-01 DIAGNOSIS — G56.22 ULNAR NEUROPATHY AT ELBOW OF LEFT UPPER EXTREMITY: ICD-10-CM

## 2022-03-01 PROCEDURE — 95912 NRV CNDJ TEST 11-12 STUDIES: CPT | Performed by: PHYSICAL MEDICINE & REHABILITATION

## 2022-03-01 PROCEDURE — 95886 MUSC TEST DONE W/N TEST COMP: CPT | Performed by: PHYSICAL MEDICINE & REHABILITATION

## 2022-03-09 ENCOUNTER — LAB ENCOUNTER (OUTPATIENT)
Dept: LAB | Age: 82
End: 2022-03-09
Attending: INTERNAL MEDICINE
Payer: MEDICARE

## 2022-03-09 DIAGNOSIS — R73.03 PREDIABETES: ICD-10-CM

## 2022-03-09 DIAGNOSIS — E78.00 HYPERCHOLESTEROLEMIA: ICD-10-CM

## 2022-03-09 LAB
ALBUMIN SERPL-MCNC: 4.1 G/DL (ref 3.4–5)
ALBUMIN/GLOB SERPL: 1.5 {RATIO} (ref 1–2)
ALP LIVER SERPL-CCNC: 65 U/L
ALT SERPL-CCNC: 44 U/L
ANION GAP SERPL CALC-SCNC: 7 MMOL/L (ref 0–18)
AST SERPL-CCNC: 32 U/L (ref 15–37)
BILIRUB SERPL-MCNC: 0.4 MG/DL (ref 0.1–2)
BUN BLD-MCNC: 18 MG/DL (ref 7–18)
BUN/CREAT SERPL: 18.9 (ref 10–20)
CALCIUM BLD-MCNC: 8.1 MG/DL (ref 8.5–10.1)
CHLORIDE SERPL-SCNC: 103 MMOL/L (ref 98–112)
CHOLEST SERPL-MCNC: 179 MG/DL (ref ?–200)
CO2 SERPL-SCNC: 29 MMOL/L (ref 21–32)
CREAT BLD-MCNC: 0.95 MG/DL
EST. AVERAGE GLUCOSE BLD GHB EST-MCNC: 114 MG/DL (ref 68–126)
FASTING PATIENT LIPID ANSWER: YES
FASTING STATUS PATIENT QL REPORTED: YES
GLOBULIN PLAS-MCNC: 2.7 G/DL (ref 2.8–4.4)
GLUCOSE BLD-MCNC: 98 MG/DL (ref 70–99)
HBA1C MFR BLD: 5.6 % (ref ?–5.7)
HDLC SERPL-MCNC: 59 MG/DL (ref 40–59)
LDLC SERPL CALC-MCNC: 102 MG/DL (ref ?–100)
OSMOLALITY SERPL CALC.SUM OF ELEC: 290 MOSM/KG (ref 275–295)
POTASSIUM SERPL-SCNC: 4.8 MMOL/L (ref 3.5–5.1)
PROT SERPL-MCNC: 6.8 G/DL (ref 6.4–8.2)
TRIGL SERPL-MCNC: 101 MG/DL (ref 30–149)
VLDLC SERPL CALC-MCNC: 17 MG/DL (ref 0–30)

## 2022-03-09 PROCEDURE — 83036 HEMOGLOBIN GLYCOSYLATED A1C: CPT

## 2022-03-09 PROCEDURE — 36415 COLL VENOUS BLD VENIPUNCTURE: CPT

## 2022-03-09 PROCEDURE — 80053 COMPREHEN METABOLIC PANEL: CPT | Performed by: INTERNAL MEDICINE

## 2022-03-09 PROCEDURE — 80061 LIPID PANEL: CPT

## 2022-03-14 ENCOUNTER — OFFICE VISIT (OUTPATIENT)
Dept: INTERNAL MEDICINE CLINIC | Facility: CLINIC | Age: 82
End: 2022-03-14
Payer: MEDICARE

## 2022-03-14 ENCOUNTER — TELEMEDICINE (OUTPATIENT)
Dept: PHYSICAL MEDICINE AND REHAB | Facility: CLINIC | Age: 82
End: 2022-03-14

## 2022-03-14 VITALS
DIASTOLIC BLOOD PRESSURE: 60 MMHG | OXYGEN SATURATION: 98 % | WEIGHT: 150 LBS | HEIGHT: 67 IN | HEART RATE: 69 BPM | BODY MASS INDEX: 23.54 KG/M2 | SYSTOLIC BLOOD PRESSURE: 126 MMHG | TEMPERATURE: 97 F

## 2022-03-14 DIAGNOSIS — E78.00 HYPERCHOLESTEROLEMIA: ICD-10-CM

## 2022-03-14 DIAGNOSIS — K21.9 GASTROESOPHAGEAL REFLUX DISEASE WITHOUT ESOPHAGITIS: ICD-10-CM

## 2022-03-14 DIAGNOSIS — M18.11 ARTHRITIS OF CARPOMETACARPAL (CMC) JOINT OF RIGHT THUMB: ICD-10-CM

## 2022-03-14 DIAGNOSIS — R73.03 PRE-DIABETES: ICD-10-CM

## 2022-03-14 DIAGNOSIS — I25.10 MILD CAD: ICD-10-CM

## 2022-03-14 DIAGNOSIS — G56.22 ULNAR NEUROPATHY AT ELBOW OF LEFT UPPER EXTREMITY: ICD-10-CM

## 2022-03-14 DIAGNOSIS — M47.816 SPONDYLOSIS OF LUMBAR REGION WITHOUT MYELOPATHY OR RADICULOPATHY: ICD-10-CM

## 2022-03-14 DIAGNOSIS — G56.21 CUBITAL TUNNEL SYNDROME ON RIGHT: ICD-10-CM

## 2022-03-14 DIAGNOSIS — M51.9 LUMBAR DISC DISEASE: ICD-10-CM

## 2022-03-14 DIAGNOSIS — K62.5 RECTAL BLEEDING: ICD-10-CM

## 2022-03-14 DIAGNOSIS — M43.16 SPONDYLOLISTHESIS OF LUMBAR REGION: ICD-10-CM

## 2022-03-14 DIAGNOSIS — M48.061 SPINAL STENOSIS OF LUMBAR REGION WITHOUT NEUROGENIC CLAUDICATION: ICD-10-CM

## 2022-03-14 DIAGNOSIS — M51.26 LUMBAR HERNIATED DISC: ICD-10-CM

## 2022-03-14 DIAGNOSIS — M48.061 LUMBAR FORAMINAL STENOSIS: ICD-10-CM

## 2022-03-14 DIAGNOSIS — M54.12 CERVICAL RADICULOPATHY: Primary | ICD-10-CM

## 2022-03-14 DIAGNOSIS — G56.01 RIGHT CARPAL TUNNEL SYNDROME: ICD-10-CM

## 2022-03-14 DIAGNOSIS — M54.16 LUMBAR RADICULOPATHY: ICD-10-CM

## 2022-03-14 DIAGNOSIS — Z00.00 MEDICARE ANNUAL WELLNESS VISIT, SUBSEQUENT: Primary | ICD-10-CM

## 2022-03-14 PROCEDURE — 99213 OFFICE O/P EST LOW 20 MIN: CPT | Performed by: INTERNAL MEDICINE

## 2022-03-14 PROCEDURE — G0439 PPPS, SUBSEQ VISIT: HCPCS | Performed by: INTERNAL MEDICINE

## 2022-03-14 PROCEDURE — 99213 OFFICE O/P EST LOW 20 MIN: CPT | Performed by: PHYSICAL MEDICINE & REHABILITATION

## 2022-03-15 ENCOUNTER — TELEPHONE (OUTPATIENT)
Dept: PHYSICAL MEDICINE AND REHAB | Facility: CLINIC | Age: 82
End: 2022-03-15

## 2022-03-15 NOTE — TELEPHONE ENCOUNTER
Per Medicare Guidelines -no authorization is required for imaging     Status: Approved-Covered Benefit    Patient notified via [employer]-he can proceed with scheduling C-Spine MRI CPT 91022

## 2022-03-17 ENCOUNTER — TELEPHONE (OUTPATIENT)
Dept: INTERNAL MEDICINE CLINIC | Facility: CLINIC | Age: 82
End: 2022-03-17

## 2022-03-17 NOTE — TELEPHONE ENCOUNTER
[facility] GI dept (any) Dr Carlos Kline, Dr Shobha Carter, Dr Rolando Frey, etc.   Thanks.

## 2022-03-17 NOTE — TELEPHONE ENCOUNTER
Routed to Dr. Sandro Tyler ---     I see in [facility] notes pt previously had Colonoscopy w/ Dr. De La Torre Smoke in 2017 & 5 year recall was noted. It was mentioned that Mr. Jason Denise has a GI at [address] he will see but I can not find a specialist name listed. Please advise.

## 2022-03-17 NOTE — TELEPHONE ENCOUNTER
Left detailed message with patient, relaying MD message below.  (OK per [facility] Verbal Release / Privacy)

## 2022-03-17 NOTE — TELEPHONE ENCOUNTER
Pt misplaced GI recommendations that   Dr Mike Richey gave him for a colonoscopy     Requests call back to advise     [phone]

## 2022-03-28 NOTE — TELEPHONE ENCOUNTER
Patient is calling back to see if our office has his records from Dr Erik Spatz from back in August of 2017. If so, please inform the patient. He would like to pick them up to bring to his appointment with a new physician for his colonoscopy. No rush per patient.  Thank you     Ph # [phone]

## 2022-03-28 NOTE — TELEPHONE ENCOUNTER
Spoke with pt, advised we do not have copies of his Colonoscopy from 2017. Pt states he spoke with Dr. Kiet Baron office and they do have it. Nothing further needed at this time.

## 2022-04-05 ENCOUNTER — HOSPITAL ENCOUNTER (OUTPATIENT)
Dept: MRI IMAGING | Age: 82
Discharge: HOME | End: 2022-04-05
Attending: PHYSICAL MEDICINE & REHABILITATION
Payer: MEDICARE

## 2022-04-05 DIAGNOSIS — M54.12 CERVICAL RADICULOPATHY: ICD-10-CM

## 2022-04-05 PROCEDURE — 72141 MRI NECK SPINE W/O DYE: CPT | Performed by: PHYSICAL MEDICINE & REHABILITATION

## 2022-04-12 ENCOUNTER — OFFICE VISIT (OUTPATIENT)
Dept: PHYSICAL MEDICINE AND REHAB | Facility: CLINIC | Age: 82
End: 2022-04-12
Payer: MEDICARE

## 2022-04-12 VITALS
HEIGHT: 67 IN | BODY MASS INDEX: 23.54 KG/M2 | WEIGHT: 150 LBS | DIASTOLIC BLOOD PRESSURE: 52 MMHG | SYSTOLIC BLOOD PRESSURE: 128 MMHG

## 2022-04-12 DIAGNOSIS — M79.642 LEFT HAND PAIN: ICD-10-CM

## 2022-04-12 DIAGNOSIS — M18.11 ARTHRITIS OF CARPOMETACARPAL (CMC) JOINT OF RIGHT THUMB: ICD-10-CM

## 2022-04-12 DIAGNOSIS — M19.042 PRIMARY OSTEOARTHRITIS OF LEFT HAND: Primary | ICD-10-CM

## 2022-04-12 DIAGNOSIS — G56.01 RIGHT CARPAL TUNNEL SYNDROME: ICD-10-CM

## 2022-04-12 DIAGNOSIS — G56.22 ULNAR NEUROPATHY AT ELBOW OF LEFT UPPER EXTREMITY: ICD-10-CM

## 2022-04-12 DIAGNOSIS — M54.12 CERVICAL RADICULOPATHY: ICD-10-CM

## 2022-04-12 DIAGNOSIS — M50.90 CERVICAL DISC DISEASE: ICD-10-CM

## 2022-04-12 DIAGNOSIS — M48.02 CERVICAL STENOSIS OF SPINE: ICD-10-CM

## 2022-04-12 PROCEDURE — 99213 OFFICE O/P EST LOW 20 MIN: CPT | Performed by: PHYSICAL MEDICINE & REHABILITATION

## 2022-04-15 ENCOUNTER — TELEPHONE (OUTPATIENT)
Dept: PHYSICAL THERAPY | Facility: HOSPITAL | Age: 82
End: 2022-04-15

## 2022-04-18 ENCOUNTER — TELEPHONE (OUTPATIENT)
Dept: NEUROLOGY | Facility: CLINIC | Age: 82
End: 2022-04-18

## 2022-04-19 NOTE — TELEPHONE ENCOUNTER
S/w patient for PT request for order. Only OT order in from Dr. Rosario Reed and per order it is not to be replaced by PT. Patient verbalized understanding.

## 2022-04-24 PROBLEM — M48.02 CERVICAL STENOSIS OF SPINE: Status: ACTIVE | Noted: 2022-04-24

## 2022-04-27 ENCOUNTER — TELEPHONE (OUTPATIENT)
Dept: INTERNAL MEDICINE CLINIC | Facility: CLINIC | Age: 82
End: 2022-04-27

## 2022-04-27 RX ORDER — SCOLOPAMINE TRANSDERMAL SYSTEM 1 MG/1
1 PATCH, EXTENDED RELEASE TRANSDERMAL
Qty: 16 PATCH | Refills: 0 | Status: SHIPPED | OUTPATIENT
Start: 2022-04-27

## 2022-04-27 NOTE — TELEPHONE ENCOUNTER
To nursing, pls tell pt rx for scopalamine patches #16 was sent. Order for covid test entered. Thanks. 1. Encounter for screening for COVID-19  - SARS-COV-2 BY PCR ([facility]);  Future

## 2022-04-27 NOTE — TELEPHONE ENCOUNTER
Patient stopped in today to request a refill on Scolopamine (16 patches) to help with sea-sickness. Please send to [facility] in [facility] on [address]. Patient is also requesting an order for a PCR test. Patient is leaving on 6/5/2022 and heading to [facility], from [facility] he will then get on a ship for a cruise. Patient needs the PCR test results within three to five days of boarding the ship.       #[phone]

## 2022-04-27 NOTE — TELEPHONE ENCOUNTER
Please advise - to DR. Mikey Mc pending Speech Language Pathology Treatment    Patient Name:   Luis Goldstein   MRN:  [id number]  Admitting Diagnosis: Prematurity, 1,000-1,249 grams, 27-28 completed weeks    Recommendations:                 General Recommendations:                1. Speech pathology 2-3x/week for developmental language and cognitive stimulation      General Precautions: Standard, (at risk for speech, language and cognitive delays)    Communication strategies:   1. Reading program to stimulate language and communication development, early communication skills    Subjective     Baby waking up prior to feeding time. Treatment of language and cognition prior to feeding baby to assist RN.     Pain/Comfort:  ·  no pain behaviors demonstrated    Objective:     Has the patient been evaluated by SLP for swallowing?      Keep patient NPO?     Current Respiratory Status:        Cognitive status: drowsy to alert state prior to feeding time. Fussy, irritable when awake. Calms to being held.     Receptive Language: Baby calmed this date 70% of time in response to speakers voice and touch. Smile noted x1 in response to SLP taking baby out of crib, however, baby kicking feet and moving arms in response to singing and talking more frequently, ~50% of time. Baby does not appear to attend to speaker or consistently stop activity (crying, feeding) in response to speakers voice. Instances of eye gaze during feeding, however, baby did not maintain eye contact for longer than ~5 seconds.     Expressive language: baby's initial cry due to hunger, immediately calmed and appeared content when offered bottle. Calm with minimal cooing after feeding, with differentiated cry due to discomfort/being placed back in crib. Minimal expressive language skills observed this date.     SLP fed baby this date due to baby demonstrating signs of hunger during SLP session. SLP primarily providing treatment for language and cognitive development and stimulation, however, baby did  not demonstrate any s/s of airway threat or aspiration during completed feeding this date.     Assessment:      Boy Surinder Goldstein is a 4 m.o. male with an SLP diagnosis prematurity, hydrocephalus, grade IV bleed. He is at high risk for speech, language and cognitive delays. Early intervention recommended.    Goals:   Multidisciplinary Problems     SLP Goals        Problem: SLP Goal    Goal Priority Disciplines Outcome   SLP Goal     SLP Ongoing (interventions implemented as appropriate)   Description:  RECEPTIVE LANGUAGE:  1. Baby will decrease or stop activity in response to voice or sound 50% of time  2. Baby will quiet/calm in response to familiar voice  3. Baby will look directly at speakers face in response to be talked to, read to, sung to  4. Baby will look at speaker and smile in response to voice  EXPRESSIVE LANGUAGE:  5. Baby will vocalize sounds other than crying  6. Parent education on speech, language and cognitive milestones                    Plan:     · Patient to be seen:  2 x/week, 3 x/week   · Plan of Care expires:  06/15/19  · Plan of Care reviewed with:  mother   · SLP Follow-Up:  Yes       Discharge recommendations:  outpatient speech therapy       Time Tracking:     SLP Treatment Date:   06/05/19  Speech Start Time:  1400  Speech Stop Time:  1425     Speech Total Time (min):  25 min    Billable Minutes: Speech Therapy Individual 25 mins    RITCHIE Marr-RICARDO  2017

## 2022-05-06 ENCOUNTER — TELEPHONE (OUTPATIENT)
Dept: PHYSICAL THERAPY | Facility: HOSPITAL | Age: 82
End: 2022-05-06

## 2022-05-18 ENCOUNTER — PATIENT MESSAGE (OUTPATIENT)
Dept: INTERNAL MEDICINE CLINIC | Facility: CLINIC | Age: 82
End: 2022-05-18

## 2022-05-19 NOTE — TELEPHONE ENCOUNTER
From: Curtis Resendez  Sent: 5/18/2022 10:16 PM CDT  To: [facility] Clinical Staff  Subject: prescription and covid test    Thank you.  Have picked up the patches and scheduled the pcr test.

## 2022-05-23 ENCOUNTER — TELEPHONE (OUTPATIENT)
Dept: INTERNAL MEDICINE CLINIC | Facility: CLINIC | Age: 82
End: 2022-05-23

## 2022-05-23 NOTE — TELEPHONE ENCOUNTER
Pt going on trip to [facility] and has gotten PCR order from Dr Yesenia Tinsley  Pt requesting RX for Paxlovid also to take with him should he test positive for COVID while away  [facility]  Please call patient to discuss/advise  Tasked to nursing

## 2022-05-23 NOTE — TELEPHONE ENCOUNTER
To nursing--please tell patient, sorry, I can't prescribe paxlovid for his trip. The FDA's emergency use authorization directives for paxlovid are to prescribe it only for pt's who have confirmed cases of COVID. Thanks.

## 2022-05-23 NOTE — TELEPHONE ENCOUNTER
Called and relayed MDs message to patient. Advised that if he does catch and test positive for covid on vacation, that he either go to the nearest urgent care or call the office for advise. Patient states that he will be on a ship most of the time, so above alternate suggestion may not work. Their will be a physician on board, but he/she will not be able to prescribe anything. He asked if message can be sent to PCP to reconsider. I re-iterated that MD most likely will decline as Paxlovid is prescribed for individuals who test positive.  Patient asked for message to be sent to MD    To dr Loren Foster

## 2022-05-23 NOTE — TELEPHONE ENCOUNTER
To nursing, please tell patient Paxlovid supplies need to be reserved for people who have confirmed COVID. Sorry I am unable to prescribe it for travel.   Thanks

## 2022-05-25 ENCOUNTER — TELEPHONE (OUTPATIENT)
Dept: FAMILY MEDICINE CLINIC | Facility: CLINIC | Age: 82
End: 2022-05-25

## 2022-05-25 ENCOUNTER — PATIENT MESSAGE (OUTPATIENT)
Dept: FAMILY MEDICINE CLINIC | Facility: CLINIC | Age: 82
End: 2022-05-25

## 2022-05-25 NOTE — TELEPHONE ENCOUNTER
Received vm from pt, looking for paxlovid rx as he will be traveling to [facility] in 10 days. Would like to have it on hand, just in case. Pt has seen Dr. Be Desai in the past for travel clinic. I left vm notifying pt that paxlovid is not recommended for travel. Pharm has short supply of med and is reserved for high risk pop who actively have covid. Can call me back if further questions.

## 2022-05-27 NOTE — TELEPHONE ENCOUNTER
Received vm from pt, following up on request for paxlovid for travel. Said he did not receive any follow up. I spoke with pt, reviewed and discussed previous recommendations - paxlovid is in short supply, only recommended for pts with active covid who are high risk. NOT recommended for travel. Since pt does not like my response, I did offer to transfer call to  to schedule a travel clinic consult. Pt declined. No further questions at this time.

## 2022-06-02 ENCOUNTER — LAB ENCOUNTER (OUTPATIENT)
Dept: LAB | Facility: HOSPITAL | Age: 82
End: 2022-06-02
Attending: INTERNAL MEDICINE
Payer: MEDICARE

## 2022-06-02 DIAGNOSIS — Z11.52 ENCOUNTER FOR SCREENING FOR COVID-19: ICD-10-CM

## 2022-06-02 LAB — SARS-COV-2 RNA RESP QL NAA+PROBE: NOT DETECTED

## 2022-06-03 ENCOUNTER — TELEPHONE (OUTPATIENT)
Dept: INTERNAL MEDICINE CLINIC | Facility: CLINIC | Age: 82
End: 2022-06-03

## 2022-07-17 RX ORDER — HYDROCORTISONE 25 MG/G
CREAM TOPICAL
Qty: 85.05 G | Refills: 0 | Status: SHIPPED | OUTPATIENT
Start: 2022-07-17

## 2022-07-17 RX ORDER — VALACYCLOVIR HYDROCHLORIDE 1 G/1
TABLET, FILM COATED ORAL
Qty: 12 TABLET | Refills: 3 | Status: SHIPPED | OUTPATIENT
Start: 2022-07-17

## 2022-07-18 ENCOUNTER — TELEPHONE (OUTPATIENT)
Dept: GASTROENTEROLOGY | Facility: CLINIC | Age: 82
End: 2022-07-18

## 2022-07-18 ENCOUNTER — OFFICE VISIT (OUTPATIENT)
Dept: GASTROENTEROLOGY | Facility: CLINIC | Age: 82
End: 2022-07-18
Payer: MEDICARE

## 2022-07-18 VITALS
HEIGHT: 67 IN | BODY MASS INDEX: 23.89 KG/M2 | HEART RATE: 67 BPM | DIASTOLIC BLOOD PRESSURE: 67 MMHG | SYSTOLIC BLOOD PRESSURE: 124 MMHG | WEIGHT: 152.19 LBS

## 2022-07-18 DIAGNOSIS — Z86.010 PERSONAL HISTORY OF COLONIC POLYPS: ICD-10-CM

## 2022-07-18 DIAGNOSIS — K62.5 BRBPR (BRIGHT RED BLOOD PER RECTUM): ICD-10-CM

## 2022-07-18 DIAGNOSIS — Z86.010 HX OF COLONIC POLYP: ICD-10-CM

## 2022-07-18 DIAGNOSIS — K62.5 BRIGHT RED BLOOD PER RECTUM: ICD-10-CM

## 2022-07-18 DIAGNOSIS — K21.9 GASTROESOPHAGEAL REFLUX DISEASE, UNSPECIFIED WHETHER ESOPHAGITIS PRESENT: Primary | ICD-10-CM

## 2022-07-18 DIAGNOSIS — Z12.11 COLON CANCER SCREENING: Primary | ICD-10-CM

## 2022-07-18 PROCEDURE — 1126F AMNT PAIN NOTED NONE PRSNT: CPT | Performed by: NURSE PRACTITIONER

## 2022-07-18 PROCEDURE — 99215 OFFICE O/P EST HI 40 MIN: CPT | Performed by: NURSE PRACTITIONER

## 2022-07-18 RX ORDER — POLYETHYLENE GLYCOL 3350, SODIUM CHLORIDE, SODIUM BICARBONATE, POTASSIUM CHLORIDE 420; 11.2; 5.72; 1.48 G/4L; G/4L; G/4L; G/4L
POWDER, FOR SOLUTION ORAL
Qty: 4000 ML | Refills: 0 | Status: SHIPPED | OUTPATIENT
Start: 2022-07-18

## 2022-07-18 NOTE — TELEPHONE ENCOUNTER
Faxed request to Dr. Joslyn Brewer at [address] for cardiac clearance to proceed with colonoscopy. F: [phone]     Fax confirmation received 07/18/2022 at 11:37 am.      Scheduled for colonoscopy with Dr. Roney Hamilton 10/17/2022. Plan to await follow up fax and/or contact [facility] directly.

## 2022-07-18 NOTE — TELEPHONE ENCOUNTER
Scheduled for:  Colonoscopy [address]04   Provider Name:  Stephen Doyle  Date:  10/17/2022  Location:  [facility]  Sedation:  Mac   AAVZ:[id number] Am   (pt is aware that [facility] will call the day before to confirm arrival time)     Prep: Trilyte  Prep instructions were given to pt in the office, pt verbalized understanding. Meds/Allergies Reconciled?:  Physician reviewed     Diagnosis with codes:  H/o colon Polyps ,Cln scrn -Z12.11 ,Brbpr -K62.5  Was patient informed to call insurance with codes (Y/N):  Yes, I confirmed Medicare  insurance with the patient. The patient also verbally understands to call his insurance to check for pre-cert, codes were given on prep instructions. Referral sent?:  Yes  [facility] or [address] notified?:   Electronic case request was sent to [facility] via [employer]. Medication Orders: This patient verbally confirmed that he is not taking:   Iron, blood thinners, BP meds, and is not diabetic   Not latex allergy, Not PCN allergy and does not have a pacemaker Pt is aware to NOT take iron pills, herbal meds and diet supplements for 7 days before exam. Also to NOT take any form of alcohol, recreational drugs and any forms of ED meds 24 hours before exam.    Misc Orders:  Patient was informed that they will need a COVID 19 test prior to their procedure. Patient verbally understood & will await a phone call from [facility] to schedule.       Further instructions given by staff:

## 2022-07-18 NOTE — TELEPHONE ENCOUNTER
Nursing:  Can you please contact Dr. Mireille Villegas (cards) for clearance prior to cln with Dr. Bairon Egan?     Thanks,  [place]

## 2022-07-21 ENCOUNTER — LAB ENCOUNTER (OUTPATIENT)
Dept: LAB | Age: 82
End: 2022-07-21
Attending: NURSE PRACTITIONER
Payer: MEDICARE

## 2022-07-21 DIAGNOSIS — K62.5 BRBPR (BRIGHT RED BLOOD PER RECTUM): ICD-10-CM

## 2022-07-21 LAB
BASOPHILS # BLD AUTO: 0.03 X10(3) UL (ref 0–0.2)
BASOPHILS NFR BLD AUTO: 0.4 %
DEPRECATED RDW RBC AUTO: 41.8 FL (ref 35.1–46.3)
EOSINOPHIL # BLD AUTO: 0.07 X10(3) UL (ref 0–0.7)
EOSINOPHIL NFR BLD AUTO: 0.9 %
ERYTHROCYTE [DISTWIDTH] IN BLOOD BY AUTOMATED COUNT: 12.6 % (ref 11–15)
HCT VFR BLD AUTO: 37.4 %
HGB BLD-MCNC: 12.3 G/DL
IMM GRANULOCYTES # BLD AUTO: 0.02 X10(3) UL (ref 0–1)
IMM GRANULOCYTES NFR BLD: 0.3 %
LYMPHOCYTES # BLD AUTO: 2.24 X10(3) UL (ref 1–4)
LYMPHOCYTES NFR BLD AUTO: 28.3 %
MCH RBC QN AUTO: 30 PG (ref 26–34)
MCHC RBC AUTO-ENTMCNC: 32.9 G/DL (ref 31–37)
MCV RBC AUTO: 91.2 FL
MONOCYTES # BLD AUTO: 0.59 X10(3) UL (ref 0.1–1)
MONOCYTES NFR BLD AUTO: 7.5 %
NEUTROPHILS # BLD AUTO: 4.96 X10 (3) UL (ref 1.5–7.7)
NEUTROPHILS # BLD AUTO: 4.96 X10(3) UL (ref 1.5–7.7)
NEUTROPHILS NFR BLD AUTO: 62.6 %
PLATELET # BLD AUTO: 260 10(3)UL (ref 150–450)
RBC # BLD AUTO: 4.1 X10(6)UL
WBC # BLD AUTO: 7.9 X10(3) UL (ref 4–11)

## 2022-07-21 PROCEDURE — 85025 COMPLETE CBC W/AUTO DIFF WBC: CPT

## 2022-07-21 PROCEDURE — 36415 COLL VENOUS BLD VENIPUNCTURE: CPT

## 2022-07-29 NOTE — TELEPHONE ENCOUNTER
[facility] [place]--    fyi    Attached cardiac clearance letter below as requested from Dr. Asha Bojorquez. Scheduled for colonoscopy with Dr. Priya Arredondo 10/17/2022. Sent to scan for records.      Thank you

## 2022-09-14 ENCOUNTER — LAB ENCOUNTER (OUTPATIENT)
Dept: LAB | Age: 82
End: 2022-09-14
Attending: INTERNAL MEDICINE
Payer: MEDICARE

## 2022-09-14 DIAGNOSIS — R73.03 PRE-DIABETES: ICD-10-CM

## 2022-09-14 DIAGNOSIS — E78.00 HYPERCHOLESTEROLEMIA: ICD-10-CM

## 2022-09-14 LAB
ALBUMIN SERPL-MCNC: 3.9 G/DL (ref 3.4–5)
ALBUMIN/GLOB SERPL: 1.4 {RATIO} (ref 1–2)
ALP LIVER SERPL-CCNC: 56 U/L
ALT SERPL-CCNC: 32 U/L
ANION GAP SERPL CALC-SCNC: 5 MMOL/L (ref 0–18)
AST SERPL-CCNC: 21 U/L (ref 15–37)
BASOPHILS # BLD AUTO: 0.04 X10(3) UL (ref 0–0.2)
BASOPHILS NFR BLD AUTO: 0.7 %
BILIRUB SERPL-MCNC: 0.5 MG/DL (ref 0.1–2)
BUN BLD-MCNC: 19 MG/DL (ref 7–18)
BUN/CREAT SERPL: 20.2 (ref 10–20)
CALCIUM BLD-MCNC: 8.7 MG/DL (ref 8.5–10.1)
CHLORIDE SERPL-SCNC: 104 MMOL/L (ref 98–112)
CO2 SERPL-SCNC: 29 MMOL/L (ref 21–32)
CREAT BLD-MCNC: 0.94 MG/DL
DEPRECATED RDW RBC AUTO: 42.9 FL (ref 35.1–46.3)
EOSINOPHIL # BLD AUTO: 0.12 X10(3) UL (ref 0–0.7)
EOSINOPHIL NFR BLD AUTO: 2.1 %
ERYTHROCYTE [DISTWIDTH] IN BLOOD BY AUTOMATED COUNT: 13 % (ref 11–15)
EST. AVERAGE GLUCOSE BLD GHB EST-MCNC: 120 MG/DL (ref 68–126)
FASTING STATUS PATIENT QL REPORTED: YES
GFR SERPLBLD BASED ON 1.73 SQ M-ARVRAT: 82 ML/MIN/1.73M2 (ref 60–?)
GLOBULIN PLAS-MCNC: 2.7 G/DL (ref 2.8–4.4)
GLUCOSE BLD-MCNC: 94 MG/DL (ref 70–99)
HBA1C MFR BLD: 5.8 % (ref ?–5.7)
HCT VFR BLD AUTO: 41.4 %
HGB BLD-MCNC: 13.9 G/DL
IMM GRANULOCYTES # BLD AUTO: 0.01 X10(3) UL (ref 0–1)
IMM GRANULOCYTES NFR BLD: 0.2 %
LYMPHOCYTES # BLD AUTO: 2.73 X10(3) UL (ref 1–4)
LYMPHOCYTES NFR BLD AUTO: 47.4 %
MCH RBC QN AUTO: 30.4 PG (ref 26–34)
MCHC RBC AUTO-ENTMCNC: 33.6 G/DL (ref 31–37)
MCV RBC AUTO: 90.6 FL
MONOCYTES # BLD AUTO: 0.51 X10(3) UL (ref 0.1–1)
MONOCYTES NFR BLD AUTO: 8.9 %
NEUTROPHILS # BLD AUTO: 2.35 X10 (3) UL (ref 1.5–7.7)
NEUTROPHILS # BLD AUTO: 2.35 X10(3) UL (ref 1.5–7.7)
NEUTROPHILS NFR BLD AUTO: 40.7 %
OSMOLALITY SERPL CALC.SUM OF ELEC: 288 MOSM/KG (ref 275–295)
PLATELET # BLD AUTO: 248 10(3)UL (ref 150–450)
POTASSIUM SERPL-SCNC: 4.5 MMOL/L (ref 3.5–5.1)
PROT SERPL-MCNC: 6.6 G/DL (ref 6.4–8.2)
RBC # BLD AUTO: 4.57 X10(6)UL
SODIUM SERPL-SCNC: 138 MMOL/L (ref 136–145)
TSI SER-ACNC: 3 MIU/ML (ref 0.36–3.74)
WBC # BLD AUTO: 5.8 X10(3) UL (ref 4–11)

## 2022-09-14 PROCEDURE — 83036 HEMOGLOBIN GLYCOSYLATED A1C: CPT

## 2022-09-14 PROCEDURE — 80053 COMPREHEN METABOLIC PANEL: CPT

## 2022-09-14 PROCEDURE — 85025 COMPLETE CBC W/AUTO DIFF WBC: CPT

## 2022-09-14 PROCEDURE — 36415 COLL VENOUS BLD VENIPUNCTURE: CPT

## 2022-09-14 PROCEDURE — 84443 ASSAY THYROID STIM HORMONE: CPT

## 2022-09-19 ENCOUNTER — OFFICE VISIT (OUTPATIENT)
Dept: INTERNAL MEDICINE CLINIC | Facility: CLINIC | Age: 82
End: 2022-09-19
Payer: MEDICARE

## 2022-09-19 VITALS
HEART RATE: 64 BPM | BODY MASS INDEX: 23.39 KG/M2 | DIASTOLIC BLOOD PRESSURE: 60 MMHG | WEIGHT: 149 LBS | HEIGHT: 67 IN | TEMPERATURE: 98 F | SYSTOLIC BLOOD PRESSURE: 132 MMHG

## 2022-09-19 DIAGNOSIS — I25.10 MILD CAD: Primary | ICD-10-CM

## 2022-09-19 DIAGNOSIS — R73.03 PRE-DIABETES: ICD-10-CM

## 2022-09-19 DIAGNOSIS — M48.061 SPINAL STENOSIS OF LUMBAR REGION WITHOUT NEUROGENIC CLAUDICATION: ICD-10-CM

## 2022-09-19 DIAGNOSIS — K21.9 GASTROESOPHAGEAL REFLUX DISEASE WITHOUT ESOPHAGITIS: ICD-10-CM

## 2022-09-19 DIAGNOSIS — E78.00 HYPERCHOLESTEROLEMIA: ICD-10-CM

## 2022-09-19 PROCEDURE — 1126F AMNT PAIN NOTED NONE PRSNT: CPT | Performed by: INTERNAL MEDICINE

## 2022-09-19 PROCEDURE — 99214 OFFICE O/P EST MOD 30 MIN: CPT | Performed by: INTERNAL MEDICINE

## 2022-09-26 ENCOUNTER — MED REC SCAN ONLY (OUTPATIENT)
Dept: INTERNAL MEDICINE CLINIC | Facility: CLINIC | Age: 82
End: 2022-09-26

## 2022-10-04 ENCOUNTER — LAB ENCOUNTER (OUTPATIENT)
Dept: LAB | Age: 82
End: 2022-10-04
Attending: INTERNAL MEDICINE
Payer: MEDICARE

## 2022-10-04 DIAGNOSIS — E78.00 HYPERCHOLESTEROLEMIA: ICD-10-CM

## 2022-10-04 LAB
CHOLEST SERPL-MCNC: 176 MG/DL (ref ?–200)
FASTING PATIENT LIPID ANSWER: YES
HDLC SERPL-MCNC: 67 MG/DL (ref 40–59)
LDLC SERPL CALC-MCNC: 94 MG/DL (ref ?–100)
NONHDLC SERPL-MCNC: 109 MG/DL (ref ?–130)
TRIGL SERPL-MCNC: 84 MG/DL (ref 30–149)
VLDLC SERPL CALC-MCNC: 14 MG/DL (ref 0–30)

## 2022-10-04 PROCEDURE — 80061 LIPID PANEL: CPT

## 2022-10-04 PROCEDURE — 36415 COLL VENOUS BLD VENIPUNCTURE: CPT

## 2022-10-05 ENCOUNTER — TELEPHONE (OUTPATIENT)
Dept: INTERNAL MEDICINE CLINIC | Facility: CLINIC | Age: 82
End: 2022-10-05

## 2022-10-17 ENCOUNTER — LAB REQUISITION (OUTPATIENT)
Dept: SURGERY | Age: 82
End: 2022-10-17
Payer: MEDICARE

## 2022-10-17 ENCOUNTER — SURGERY CENTER DOCUMENTATION (OUTPATIENT)
Dept: SURGERY | Age: 82
End: 2022-10-17

## 2022-10-17 DIAGNOSIS — Z12.11 COLON CANCER SCREENING: ICD-10-CM

## 2022-10-17 DIAGNOSIS — K63.5 POLYP OF COLON, UNSPECIFIED PART OF COLON, UNSPECIFIED TYPE: ICD-10-CM

## 2022-10-17 DIAGNOSIS — K57.30 DIVERTICULA OF COLON: ICD-10-CM

## 2022-10-17 DIAGNOSIS — Z86.010 HISTORY OF COLONIC POLYPS: ICD-10-CM

## 2022-10-17 DIAGNOSIS — K62.5 HEMORRHAGE OF RECTUM: ICD-10-CM

## 2022-10-17 PROCEDURE — 88305 TISSUE EXAM BY PATHOLOGIST: CPT | Performed by: INTERNAL MEDICINE

## 2022-10-28 ENCOUNTER — TELEPHONE (OUTPATIENT)
Dept: GASTROENTEROLOGY | Facility: CLINIC | Age: 82
End: 2022-10-28

## 2022-10-28 NOTE — TELEPHONE ENCOUNTER
Results letter mailed to patient per   Colon recall entered for repeat in 3 yrs,10/17/2025  Colon done in 10/17/2022   Updated and Patient Outreach was placed for Colon recall  Enio Gonzalez MD  P [facility] Gi Clinical Staff  GI staff: please place recall for colonoscopy in 3 years

## 2022-11-04 NOTE — TELEPHONE ENCOUNTER
Contacted pt to explain that due to the continued COVID-19 outbreak, non-essential outpatient rehab patient care has been delayed until 5/4/20. Discussed patient's current symptoms, home program, and concerns at this point.   Future appts will continue to b
129

## 2023-02-14 ENCOUNTER — OFFICE VISIT (OUTPATIENT)
Dept: INTERNAL MEDICINE CLINIC | Facility: CLINIC | Age: 83
End: 2023-02-14

## 2023-02-14 ENCOUNTER — LAB ENCOUNTER (OUTPATIENT)
Dept: LAB | Age: 83
End: 2023-02-14
Attending: INTERNAL MEDICINE
Payer: MEDICARE

## 2023-02-14 VITALS
HEIGHT: 67 IN | WEIGHT: 152 LBS | SYSTOLIC BLOOD PRESSURE: 130 MMHG | HEART RATE: 68 BPM | TEMPERATURE: 99 F | DIASTOLIC BLOOD PRESSURE: 64 MMHG | OXYGEN SATURATION: 98 % | BODY MASS INDEX: 23.86 KG/M2

## 2023-02-14 DIAGNOSIS — R55 SYNCOPE, UNSPECIFIED SYNCOPE TYPE: ICD-10-CM

## 2023-02-14 DIAGNOSIS — R55 SYNCOPE, UNSPECIFIED SYNCOPE TYPE: Primary | ICD-10-CM

## 2023-02-14 DIAGNOSIS — E78.00 HYPERCHOLESTEROLEMIA: ICD-10-CM

## 2023-02-14 LAB
ALBUMIN SERPL-MCNC: 3.9 G/DL (ref 3.4–5)
ALBUMIN/GLOB SERPL: 1.4 {RATIO} (ref 1–2)
ALP LIVER SERPL-CCNC: 54 U/L
ALT SERPL-CCNC: 37 U/L
ANION GAP SERPL CALC-SCNC: 3 MMOL/L (ref 0–18)
AST SERPL-CCNC: 28 U/L (ref 15–37)
ATRIAL RATE: 54 BPM
BASOPHILS # BLD AUTO: 0.03 X10(3) UL (ref 0–0.2)
BASOPHILS NFR BLD AUTO: 0.4 %
BILIRUB SERPL-MCNC: 0.4 MG/DL (ref 0.1–2)
BUN BLD-MCNC: 19 MG/DL (ref 7–18)
BUN/CREAT SERPL: 20.2 (ref 10–20)
CALCIUM BLD-MCNC: 8.9 MG/DL (ref 8.5–10.1)
CHLORIDE SERPL-SCNC: 109 MMOL/L (ref 98–112)
CO2 SERPL-SCNC: 28 MMOL/L (ref 21–32)
CREAT BLD-MCNC: 0.94 MG/DL
DEPRECATED RDW RBC AUTO: 42.5 FL (ref 35.1–46.3)
EOSINOPHIL # BLD AUTO: 0.06 X10(3) UL (ref 0–0.7)
EOSINOPHIL NFR BLD AUTO: 0.9 %
ERYTHROCYTE [DISTWIDTH] IN BLOOD BY AUTOMATED COUNT: 12.8 % (ref 11–15)
FASTING STATUS PATIENT QL REPORTED: NO
GFR SERPLBLD BASED ON 1.73 SQ M-ARVRAT: 81 ML/MIN/1.73M2 (ref 60–?)
GLOBULIN PLAS-MCNC: 2.8 G/DL (ref 2.8–4.4)
GLUCOSE BLD-MCNC: 101 MG/DL (ref 70–99)
HCT VFR BLD AUTO: 38.9 %
HGB BLD-MCNC: 12.8 G/DL
IMM GRANULOCYTES # BLD AUTO: 0.03 X10(3) UL (ref 0–1)
IMM GRANULOCYTES NFR BLD: 0.4 %
LYMPHOCYTES # BLD AUTO: 2.38 X10(3) UL (ref 1–4)
LYMPHOCYTES NFR BLD AUTO: 35.6 %
MCH RBC QN AUTO: 29.6 PG (ref 26–34)
MCHC RBC AUTO-ENTMCNC: 32.9 G/DL (ref 31–37)
MCV RBC AUTO: 90 FL
MONOCYTES # BLD AUTO: 0.61 X10(3) UL (ref 0.1–1)
MONOCYTES NFR BLD AUTO: 9.1 %
NEUTROPHILS # BLD AUTO: 3.58 X10 (3) UL (ref 1.5–7.7)
NEUTROPHILS # BLD AUTO: 3.58 X10(3) UL (ref 1.5–7.7)
NEUTROPHILS NFR BLD AUTO: 53.6 %
OSMOLALITY SERPL CALC.SUM OF ELEC: 292 MOSM/KG (ref 275–295)
P AXIS: 74 DEGREES
P-R INTERVAL: 160 MS
PLATELET # BLD AUTO: 256 10(3)UL (ref 150–450)
POTASSIUM SERPL-SCNC: 4.6 MMOL/L (ref 3.5–5.1)
PROT SERPL-MCNC: 6.7 G/DL (ref 6.4–8.2)
Q-T INTERVAL: 440 MS
QRS DURATION: 86 MS
QTC CALCULATION (BEZET): 417 MS
R AXIS: 24 DEGREES
RBC # BLD AUTO: 4.32 X10(6)UL
SODIUM SERPL-SCNC: 140 MMOL/L (ref 136–145)
T AXIS: 28 DEGREES
TSI SER-ACNC: 2 MIU/ML (ref 0.36–3.74)
VENTRICULAR RATE: 54 BPM
WBC # BLD AUTO: 6.7 X10(3) UL (ref 4–11)

## 2023-02-14 PROCEDURE — 1126F AMNT PAIN NOTED NONE PRSNT: CPT | Performed by: INTERNAL MEDICINE

## 2023-02-14 PROCEDURE — 80053 COMPREHEN METABOLIC PANEL: CPT

## 2023-02-14 PROCEDURE — 36415 COLL VENOUS BLD VENIPUNCTURE: CPT

## 2023-02-14 PROCEDURE — 99215 OFFICE O/P EST HI 40 MIN: CPT | Performed by: INTERNAL MEDICINE

## 2023-02-14 PROCEDURE — 85025 COMPLETE CBC W/AUTO DIFF WBC: CPT

## 2023-02-14 PROCEDURE — 93000 ELECTROCARDIOGRAM COMPLETE: CPT | Performed by: INTERNAL MEDICINE

## 2023-02-14 PROCEDURE — 84443 ASSAY THYROID STIM HORMONE: CPT

## 2023-02-15 ENCOUNTER — TELEPHONE (OUTPATIENT)
Dept: INTERNAL MEDICINE CLINIC | Facility: CLINIC | Age: 83
End: 2023-02-15

## 2023-02-15 DIAGNOSIS — D64.9 MILD ANEMIA: Primary | ICD-10-CM

## 2023-02-16 NOTE — TELEPHONE ENCOUNTER
2/14/23--cbc cmp tsh--hgb 12. 8. repeat CBC in 1 mo. I note pt has seen results. I sent "[employer]hart message. 1. Mild anemia  - CBC WITH DIFFERENTIAL WITH PLATELET;  Future

## 2023-03-01 ENCOUNTER — HOSPITAL ENCOUNTER (OUTPATIENT)
Dept: CV DIAGNOSTICS | Facility: HOSPITAL | Age: 83
Discharge: HOME | End: 2023-03-01
Attending: INTERNAL MEDICINE
Payer: MEDICARE

## 2023-03-01 DIAGNOSIS — R55 SYNCOPE, UNSPECIFIED SYNCOPE TYPE: ICD-10-CM

## 2023-03-01 PROCEDURE — 93306 TTE W/DOPPLER COMPLETE: CPT | Performed by: INTERNAL MEDICINE

## 2023-03-01 PROCEDURE — 93225 XTRNL ECG REC<48 HRS REC: CPT | Performed by: INTERNAL MEDICINE

## 2023-03-01 PROCEDURE — 93226 XTRNL ECG REC<48 HR SCAN A/R: CPT | Performed by: INTERNAL MEDICINE

## 2023-03-01 PROCEDURE — 93227 XTRNL ECG REC<48 HR R&I: CPT | Performed by: INTERNAL MEDICINE

## 2023-03-10 ENCOUNTER — TELEPHONE (OUTPATIENT)
Dept: INTERNAL MEDICINE CLINIC | Facility: CLINIC | Age: 83
End: 2023-03-10

## 2023-03-10 NOTE — TELEPHONE ENCOUNTER
As FYI to DR. BAKRE - called patient  and relayed DR. BAKER message -patient states he had petrona with cardiologist on 3/8/23 and has now a 14 day Holter Monitor on

## 2023-03-10 NOTE — TELEPHONE ENCOUNTER
To nursing,   Please tell patient 48-hour Holter monitor results look good. The 3 beats of nonsustained ventricular tachycardia is usually not significant in view of his normal heart function on recent echo. He should still follow-up with his cardiologist, Dr. Clinton Barker for his recommendations. Thanks. 3/1-3/3/23---48 hr Holter monitor-sinus rhythm, 1 run of NSVT 3 beats at 117 bpm.   I note pt has seen results. Results routed to Dr Clinton Barker, his cardiologist, in results note. 3/1/2023-echo-EF 60-65%. He has changed PCP to my associate Dr Joao Trujillo (my long term is this month).

## 2023-03-24 NOTE — PROCEDURES
[facility]    [facility] Cardiac Cath Procedure Note  Grace Hayes Patient Status:  Outpatient in a Bed    1940 MRN [id number]   Location [facility] Attending Jj Alba, [address] [facility] Day # 0 PCP Griselda Buenrostro infiltrate the right radial artery for local anesthesia and a 6 Icelandic introducer sheath was inserted into the right radial artery. Radial artery loop causing spasm and pain therefore switched to femoral artery access.   Lidocaine 1% was used to infiltrate Statement Selected

## 2023-05-01 RX ORDER — BACLOFEN 10 MG/1
10 TABLET ORAL 3 TIMES DAILY PRN
Qty: 90 TABLET | Refills: 2 | OUTPATIENT
Start: 2023-05-01

## 2023-05-09 NOTE — TELEPHONE ENCOUNTER
See 5/8/2023 refill request.  This refill request was not processed or rejected. No notes from 5/1/2023.

## 2023-05-09 NOTE — TELEPHONE ENCOUNTER
Refill Request    Medication request: baclofen 10 MG Oral Tab  Take 1 tablet (10 mg total) by mouth 3 (three) times daily as needed. ACP:8/56/4196  Munira Kiran MD  Due back to clinic per last office note: \"He will follow up as needed. \"  NOV: Visit date not found      [place]: July 2020   (last ordered 7/9/2020) not available in [employer]    Urine drug screen (if applicable): None  Pain contract: None    LOV plan (if weaning or changing medications): Per Dr. Tristan Brown LOV: Marcelo Dutta patient does not need any pain medications at this time. \"      Patient sent in request on 5/1/2023, but it does not appear to be processed. Sending through this request to Dr. Megan Eduardo, and will reject the original request and vanita to see this Refill date.

## 2023-05-10 RX ORDER — BACLOFEN 10 MG/1
10 TABLET ORAL 3 TIMES DAILY PRN
Qty: 90 TABLET | Refills: 2 | Status: SHIPPED | OUTPATIENT
Start: 2023-05-10

## 2023-07-25 ENCOUNTER — OFFICE VISIT (OUTPATIENT)
Dept: FAMILY MEDICINE CLINIC | Facility: CLINIC | Age: 83
End: 2023-07-25
Payer: MEDICARE

## 2023-07-25 ENCOUNTER — HOSPITAL ENCOUNTER (OUTPATIENT)
Age: 83
Discharge: HOME | End: 2023-07-25
Payer: MEDICARE

## 2023-07-25 VITALS
DIASTOLIC BLOOD PRESSURE: 56 MMHG | SYSTOLIC BLOOD PRESSURE: 138 MMHG | HEIGHT: 68 IN | BODY MASS INDEX: 22.13 KG/M2 | HEART RATE: 58 BPM | RESPIRATION RATE: 18 BRPM | WEIGHT: 146 LBS | OXYGEN SATURATION: 100 % | TEMPERATURE: 98 F

## 2023-07-25 VITALS
HEART RATE: 60 BPM | SYSTOLIC BLOOD PRESSURE: 134 MMHG | TEMPERATURE: 99 F | DIASTOLIC BLOOD PRESSURE: 62 MMHG | RESPIRATION RATE: 16 BRPM | OXYGEN SATURATION: 97 %

## 2023-07-25 DIAGNOSIS — S01.01XA LACERATION OF SCALP, INITIAL ENCOUNTER: Primary | ICD-10-CM

## 2023-07-25 PROCEDURE — 99212 OFFICE O/P EST SF 10 MIN: CPT | Performed by: NURSE PRACTITIONER

## 2023-07-25 PROCEDURE — 12001 RPR S/N/AX/GEN/TRNK 2.5CM/<: CPT | Performed by: NURSE PRACTITIONER

## 2023-07-25 NOTE — ED INITIAL ASSESSMENT (HPI)
Pt sent from walk in clinic. Laceration to right scalp. Denies LOC. Pt states hitting his head on a piece of metal of tractor. Denies nausea or vomiting.

## 2023-07-25 NOTE — DISCHARGE INSTRUCTIONS
Gently wash the wound twice daily with soap and water. You can shower. Dry the wound thoroughly after washing. You can apply antibiotic ointment twice daily. The staples should be removed in 7 days. Follow-up with your doctor for a wound check. Return for any signs of infection or other concerns.

## 2023-08-01 ENCOUNTER — HOSPITAL ENCOUNTER (OUTPATIENT)
Age: 83
Discharge: HOME | End: 2023-08-01
Payer: MEDICARE

## 2023-08-01 VITALS
TEMPERATURE: 97 F | HEART RATE: 62 BPM | OXYGEN SATURATION: 98 % | RESPIRATION RATE: 18 BRPM | DIASTOLIC BLOOD PRESSURE: 52 MMHG | SYSTOLIC BLOOD PRESSURE: 132 MMHG

## 2023-08-01 DIAGNOSIS — Z48.02 ENCOUNTER FOR STAPLE REMOVAL: ICD-10-CM

## 2023-08-01 DIAGNOSIS — S01.01XD SCALP LACERATION, SUBSEQUENT ENCOUNTER: Primary | ICD-10-CM

## 2023-08-01 PROCEDURE — 99024 POSTOP FOLLOW-UP VISIT: CPT | Performed by: PHYSICIAN ASSISTANT

## 2023-08-01 NOTE — ED INITIAL ASSESSMENT (HPI)
Pt here requesting staple removal. Two staples placed a week ago to posterior R scalp.  No s/s infection

## 2023-10-19 ENCOUNTER — LAB ENCOUNTER (OUTPATIENT)
Dept: LAB | Age: 83
End: 2023-10-19
Attending: INTERNAL MEDICINE
Payer: MEDICARE

## 2023-10-19 DIAGNOSIS — K21.9 GASTROESOPHAGEAL REFLUX DISEASE, UNSPECIFIED WHETHER ESOPHAGITIS PRESENT: ICD-10-CM

## 2023-10-19 DIAGNOSIS — Z85.46 H/O PROSTATE CANCER: Primary | ICD-10-CM

## 2023-10-19 DIAGNOSIS — R73.03 PREDIABETES: ICD-10-CM

## 2023-10-19 DIAGNOSIS — E78.00 HYPERCHOLESTEROLEMIA: ICD-10-CM

## 2023-10-19 LAB
ALBUMIN SERPL-MCNC: 4.1 G/DL (ref 3.4–5)
ALBUMIN/GLOB SERPL: 1.5 {RATIO} (ref 1–2)
ALP LIVER SERPL-CCNC: 56 U/L
ALT SERPL-CCNC: 31 U/L
ANION GAP SERPL CALC-SCNC: 6 MMOL/L (ref 0–18)
AST SERPL-CCNC: 24 U/L (ref 15–37)
BASOPHILS # BLD AUTO: 0.04 X10(3) UL (ref 0–0.2)
BASOPHILS NFR BLD AUTO: 0.6 %
BILIRUB SERPL-MCNC: 0.5 MG/DL (ref 0.1–2)
BUN BLD-MCNC: 17 MG/DL (ref 7–18)
BUN/CREAT SERPL: 18.3 (ref 10–20)
CALCIUM BLD-MCNC: 8.8 MG/DL (ref 8.5–10.1)
CHLORIDE SERPL-SCNC: 108 MMOL/L (ref 98–112)
CHOLEST SERPL-MCNC: 162 MG/DL (ref ?–200)
CO2 SERPL-SCNC: 26 MMOL/L (ref 21–32)
CREAT BLD-MCNC: 0.93 MG/DL
DEPRECATED RDW RBC AUTO: 43.2 FL (ref 35.1–46.3)
EGFRCR SERPLBLD CKD-EPI 2021: 82 ML/MIN/1.73M2 (ref 60–?)
EOSINOPHIL # BLD AUTO: 0.15 X10(3) UL (ref 0–0.7)
EOSINOPHIL NFR BLD AUTO: 2.3 %
ERYTHROCYTE [DISTWIDTH] IN BLOOD BY AUTOMATED COUNT: 13.2 % (ref 11–15)
EST. AVERAGE GLUCOSE BLD GHB EST-MCNC: 123 MG/DL (ref 68–126)
FASTING PATIENT LIPID ANSWER: YES
FASTING STATUS PATIENT QL REPORTED: YES
GLOBULIN PLAS-MCNC: 2.8 G/DL (ref 2.8–4.4)
GLUCOSE BLD-MCNC: 106 MG/DL (ref 70–99)
HBA1C MFR BLD: 5.9 % (ref ?–5.7)
HCT VFR BLD AUTO: 41.6 %
HDLC SERPL-MCNC: 66 MG/DL (ref 40–59)
HGB BLD-MCNC: 13.8 G/DL
IMM GRANULOCYTES # BLD AUTO: 0.01 X10(3) UL (ref 0–1)
IMM GRANULOCYTES NFR BLD: 0.2 %
LDLC SERPL CALC-MCNC: 82 MG/DL (ref ?–100)
LYMPHOCYTES # BLD AUTO: 2.78 X10(3) UL (ref 1–4)
LYMPHOCYTES NFR BLD AUTO: 41.7 %
MCH RBC QN AUTO: 29.7 PG (ref 26–34)
MCHC RBC AUTO-ENTMCNC: 33.2 G/DL (ref 31–37)
MCV RBC AUTO: 89.7 FL
MONOCYTES # BLD AUTO: 0.53 X10(3) UL (ref 0.1–1)
MONOCYTES NFR BLD AUTO: 8 %
NEUTROPHILS # BLD AUTO: 3.15 X10 (3) UL (ref 1.5–7.7)
NEUTROPHILS # BLD AUTO: 3.15 X10(3) UL (ref 1.5–7.7)
NEUTROPHILS NFR BLD AUTO: 47.2 %
NONHDLC SERPL-MCNC: 96 MG/DL (ref ?–130)
OSMOLALITY SERPL CALC.SUM OF ELEC: 292 MOSM/KG (ref 275–295)
PLATELET # BLD AUTO: 247 10(3)UL (ref 150–450)
POTASSIUM SERPL-SCNC: 4.5 MMOL/L (ref 3.5–5.1)
PROT SERPL-MCNC: 6.9 G/DL (ref 6.4–8.2)
PSA SERPL-MCNC: <0.01 NG/ML (ref ?–4)
RBC # BLD AUTO: 4.64 X10(6)UL
SODIUM SERPL-SCNC: 140 MMOL/L (ref 136–145)
TRIGL SERPL-MCNC: 71 MG/DL (ref 30–149)
VLDLC SERPL CALC-MCNC: 11 MG/DL (ref 0–30)
WBC # BLD AUTO: 6.7 X10(3) UL (ref 4–11)

## 2023-10-19 PROCEDURE — 80053 COMPREHEN METABOLIC PANEL: CPT

## 2023-10-19 PROCEDURE — 80061 LIPID PANEL: CPT

## 2023-10-19 PROCEDURE — 36415 COLL VENOUS BLD VENIPUNCTURE: CPT

## 2023-10-19 PROCEDURE — 84153 ASSAY OF PSA TOTAL: CPT

## 2023-10-19 PROCEDURE — 85025 COMPLETE CBC W/AUTO DIFF WBC: CPT

## 2023-10-19 PROCEDURE — 83036 HEMOGLOBIN GLYCOSYLATED A1C: CPT

## 2024-01-31 ENCOUNTER — OFFICE VISIT (OUTPATIENT)
Dept: PHYSICAL MEDICINE AND REHAB | Facility: CLINIC | Age: 84
End: 2024-01-31
Payer: MEDICARE

## 2024-01-31 VITALS — WEIGHT: 150 LBS | BODY MASS INDEX: 22.73 KG/M2 | HEIGHT: 68 IN

## 2024-01-31 DIAGNOSIS — M54.16 LUMBAR RADICULOPATHY: ICD-10-CM

## 2024-01-31 DIAGNOSIS — M48.061 SPINAL STENOSIS OF LUMBAR REGION WITHOUT NEUROGENIC CLAUDICATION: Primary | ICD-10-CM

## 2024-01-31 DIAGNOSIS — M51.9 LUMBAR DISC DISEASE: ICD-10-CM

## 2024-01-31 DIAGNOSIS — M51.26 LUMBAR HERNIATED DISC: ICD-10-CM

## 2024-01-31 DIAGNOSIS — M48.061 LUMBAR FORAMINAL STENOSIS: ICD-10-CM

## 2024-01-31 DIAGNOSIS — M43.16 SPONDYLOLISTHESIS OF LUMBAR REGION: ICD-10-CM

## 2024-01-31 PROCEDURE — 1126F AMNT PAIN NOTED NONE PRSNT: CPT | Performed by: PHYSICAL MEDICINE & REHABILITATION

## 2024-01-31 PROCEDURE — 99214 OFFICE O/P EST MOD 30 MIN: CPT | Performed by: PHYSICAL MEDICINE & REHABILITATION

## 2024-01-31 RX ORDER — DORZOLAMIDE HYDROCHLORIDE AND TIMOLOL MALEATE 20; 5 MG/ML; MG/ML
1 SOLUTION/ DROPS OPHTHALMIC 2 TIMES DAILY
COMMUNITY
Start: 2023-09-12

## 2024-01-31 RX ORDER — BACLOFEN 10 MG/1
10 TABLET ORAL 3 TIMES DAILY PRN
Qty: 90 TABLET | Refills: 2 | Status: SHIPPED | OUTPATIENT
Start: 2024-01-31

## 2024-01-31 NOTE — ADDENDUM NOTE
Addended by: ZAID GONZALEZ on: 1/31/2024 02:50 PM     Modules accepted: Orders, Level of Service

## 2024-01-31 NOTE — PATIENT INSTRUCTIONS
Plan  He will get back into the PT to upgrade his home exercise program.    I will hold on having him get any new x-rays.    He will follow up in 6 months or sooner if needed.

## 2024-01-31 NOTE — PROGRESS NOTES
Low Back Pain H & P    Chief Complaint:   Chief Complaint   Patient presents with    Follow - Up     LOV 4/12/2022 Patient follows up on low back pain. States he hasn't had any significant back pain in the last couple years, occasionally will have 'twinges of pain' that subside within 24-36 hours after taking advil, states this becomes triggered with bending. Continues to be very active. Interested in doing PT with Debi for his range of motion. LOP 0/10     Nursing note reviewed and verified.    Patient was last seen on 4/12/2022.  He has been using the baclofen as needed.  He has been doing his HEP and he is very active without significant pain.  Repetitive bending will give him some low back pain.  He will get a tightness in the low back often. These will resolve on their own.  He will have this when he is working outside.  He denies numbness, tingling, weakness, or pain in the legs.    Past Medical History   Past Medical History:   Diagnosis Date    Abnormal Heart Score CT 07/2020    calcium heart score 277    Abnormal MRI, knee 05/2017    MRI R knee 5/2017 per Dr. Heath-medial meniscal tear and MCL sprain. physical therapy.    Acute torn meniscus of knee     RIGHT KNEE    Allergic rhinitis     Atherosclerosis of coronary artery Partial blocked artery    Back problem     Mccrary esophagus 1995    No symptoms in last 10 years    Cancer (HCC) 2000    prostate    Colon polyps 2022    repeat CLN in 2025    Coronary atherosclerosis     Diverticular disease Unknown    Diverticulosis     seen on colonoscopy    GERD (gastroesophageal reflux disease)     EGD 10/30/17 Dr Kapoor at [facility]--poss short segment barretts--bx however neg.     H/O colonoscopy 06/11/2012    cscopy Dr Kapoor -no recurrent polyps, sigmoid diverticulosis and int hemorrhoids.     Hearing loss     hearing aides    Heart disease 2019    Moderate blockage in one artery    Hemorrhoids 2017    Herpes labialis     Hiatal hernia 1999    EGD  , ,   Dr Kapoor     High cholesterol     Hyperlipidemia     Kidney stones 1980    removed via cystoscopy at [facility] [place]     Lumbar degenerative disc disease     Dr Swain did LESIs , , ; has seen Dr. Hwang and has seen Dr. Marie neurosurg at [facility].     Lumbar herniated disc     was hosp in [place] and had back brace in  after hurt back taboganning    Mild CAD     Cardiac cath 20 Dr Salguero--50% LAD stenosis. medical mgmt.    Ocular hypertension of right eye 2016    taking Latanoprost OU qhs     Pinguecula of both eyes     Prediabetes     A1c of 5.8 in 2015    Primary open angle glaucoma (POAG) of right eye 2018 new diagnosis of COAG OD- based on progression of OCT OD- patient already was on Latanoprost qhsOU - previously treated for OHT OD    Primary open angle glaucoma of left eye 2016    3/8/16 started on Latanoprost qhs OU by PPS- due to OHT OU (36/41)and thinning of OCT OS- treating OHT OD and COAG OS    Prostate cancer (HCC)     prostatectomy at [place]---Dr Lenz    Skin lesion of face     sees derm Dr. Antonio in [place]    Superficial punctate keratitis 2010    left    Thoracic spondylosis        Past Surgical History   Past Surgical History:   Procedure Laterality Date    CATARACT Left 2019    [facility]    CATARACT Right 2019    COLONOSCOPY  2007    Dr Kapoor at [place]    OTHER      egd - -,5-,6--Dr Kapoor    OTHER SURGICAL HISTORY      cystoscopy to remove kidney stones - comm hosp     REMV PROSTATE,RETROPUB,RADICAL      prostatectomy for prostate cancer Dr. Lenz at [place]    SIGMOIDOSCOPY,DIAGNOSTIC  Previous annual physicals    TONSILLECTOMY  1947    age 7       Family History   Family History   Problem Relation Age of Onset    Cancer Father          age 72 lung cancer    Stroke Father          age 72    Breast Cancer Mother  48    Dementia Mother          age 84 pneumonia    Other (Other[other]) Mother     Lipids Brother     Diabetes Brother     Other (overweight[other]) Brother     Other (back surgery[other]) Brother     Other (1 brother[other]) Other     Breast Cancer Daughter 45        DCIS-had double mastectomy    Diabetes Brother     Lipids Brother     Lipids Brother     Glaucoma Neg     Macular degeneration Neg        Social History   Social History     Socioeconomic History    Marital status:      Spouse name: Not on file    Number of children: Not on file    Years of education: Not on file    Highest education level: Not on file   Occupational History    Occupation: retired banker   Tobacco Use    Smoking status: Former     Packs/day: 0.00     Years: 35.00     Additional pack years: 0.00     Total pack years: 0.00     Types: Pipe, Cigarettes     Quit date: 1988     Years since quittin.1    Smokeless tobacco: Never    Tobacco comments:     Pipe smoker only   Vaping Use    Vaping Use: Never used   Substance and Sexual Activity    Alcohol use: Yes     Alcohol/week: 14.0 standard drinks of alcohol     Types: 14 Glasses of wine per week     Comment: 2 glasses wine per day.    Drug use: No    Sexual activity: Not on file   Other Topics Concern     Service Not Asked    Blood Transfusions Not Asked    Caffeine Concern Yes     Comment: 2 cups     Occupational Exposure Not Asked    Hobby Hazards Not Asked    Sleep Concern Not Asked    Stress Concern Not Asked    Weight Concern Not Asked    Special Diet Not Asked    Back Care Not Asked    Exercise Yes     Comment: walking, golfing    Bike Helmet Not Asked    Seat Belt Not Asked    Self-Exams Not Asked   Social History Narrative    The patient does not use an assistive device..      The patient does live in a home with stairs.     Social Determinants of Health     Financial Resource Strain: Not on file   Food Insecurity: Not on file   Transportation Needs: Not on  file   Physical Activity: Not on file   Stress: Not on file   Social Connections: Not on file   Housing Stability: Not on file       PE:  The patient does appear in his stated age in no distress.  The patient is well groomed.    Psychiatric:  The patient is alert and oriented x 3.  The patient has a normal affect and mood.      Respiratory:  No acute respiratory distress. Patient does not have a cough.    HEENT:  Extraocular muscles are intact. There is no kern icterus. Pupils are equal, round, and reactive to light. No redness or discharge bilaterally.    Skin:  There are no rashes or lesions.    Vitals:  There were no vitals filed for this visit.    Gait:    Gait: Normal gait   Sit to Stand: no difficulty    RIGHT Walking on Toes: no difficulty   LEFT Walking on Toes: no difficulty   RIGHT Walking on Heels: severe difficulty   LEFT Walking on Heels: no difficulty     Lumbar Spine:    Scoliosis: Thoracic dextroscoliosis that is mild-moderate and Lumbar levoscoliosis that is mild-moderate.  He stands with mild flexed posture.   Lumbar Flexion: 70 degrees Gives the patient pain in the bilateral posterior leg(s).   Lumbar Extension: 10 degrees Painless     Lumbar Spine Palpation:    Spinous Processes: Non-tender for all Spinous Processes   Z-joints: Non-tender for all Z-joints   SIJ: Non-tender for bilateral SIJ   Piriformis Muscle: Non-tender bilateral Piriformis muscles   Greater Trochanteric Bursa: Non-tender for bilateral Greater Trochanteric Bursa     Vascular lower extremity:   Dorsalis pedis pulse-RIGHT 2+   Dorsalis pedis pulse-LEFT 2+   Tibialis posterior pulse-RIGHT 2+   Tibialis posterior pulse-LEFT 2+     Neurological Lower Extremity:    Light Touch Sensation: Intact in bilateral Lower Extremities   LE Muscle Strength: All LE strength measurements 5/5 except:  Hamstring RIGHT:   4/5  Hamstring LEFT:   4+/5  Ankle Dorsiflexlon RIGHT:   4-/5  Extensor Hallucis Longus RIGHT:   4-/5   RIGHT plantar reflexes:  downward response   LEFT plantar reflexes: downward response   Reflexes: absent in bilateral lower extremities     Assessment  1. L3-4 mod-severe central stenosis    2. L5-S1 left mod/right severe, L4-5 left mild-mod/right mod, L3-4 bilateral mod, L2-3 left mod/right severe foraminal stenosis      3. L5-S1 bilateral mod foraminal, L4-5 mod diffuse, L3-4 mod diffuse, L2-3 mod diffuse bulging discs     4. L5-S1 mild right paracentral HNP, L4-5 right mild HNP, L3-4 right mild HNP     5. L4-5 stable grade 1 spondylolisthesis (S1 is high riding & L1 with small ribs)     6. right > left L5-S1 radiculopathy      Plan  He will get back into the PT to upgrade his home exercise program.    I will hold on having him get any new x-rays.    He will continue with the Baclofen for now as needed.    He will follow up in 6 months or sooner if needed.    The patient understands and agrees with the stated plan.  Ryan Swain MD  1/31/2024

## 2024-02-06 RX ORDER — VALACYCLOVIR HYDROCHLORIDE 1 G/1
TABLET, FILM COATED ORAL
Qty: 12 TABLET | Refills: 3 | Status: CANCELLED | OUTPATIENT
Start: 2024-02-06

## 2024-03-28 ENCOUNTER — OFFICE VISIT (OUTPATIENT)
Dept: PHYSICAL THERAPY | Facility: HOSPITAL | Age: 84
End: 2024-03-28
Attending: PHYSICAL MEDICINE & REHABILITATION
Payer: MEDICARE

## 2024-03-28 DIAGNOSIS — M51.26 LUMBAR HERNIATED DISC: ICD-10-CM

## 2024-03-28 DIAGNOSIS — M48.061 SPINAL STENOSIS OF LUMBAR REGION WITHOUT NEUROGENIC CLAUDICATION: Primary | ICD-10-CM

## 2024-03-28 DIAGNOSIS — M51.9 LUMBAR DISC DISEASE: ICD-10-CM

## 2024-03-28 DIAGNOSIS — M43.16 SPONDYLOLISTHESIS OF LUMBAR REGION: ICD-10-CM

## 2024-03-28 DIAGNOSIS — M54.16 LUMBAR RADICULOPATHY: ICD-10-CM

## 2024-03-28 DIAGNOSIS — M48.061 LUMBAR FORAMINAL STENOSIS: ICD-10-CM

## 2024-03-28 PROCEDURE — 97162 PT EVAL MOD COMPLEX 30 MIN: CPT

## 2024-03-28 PROCEDURE — 97110 THERAPEUTIC EXERCISES: CPT

## 2024-03-28 NOTE — PROGRESS NOTES
LUMBAR SPINE EVALUATION:   Lb Pimentel    1/29/1940  Diagnosis: Spinal stenosis of lumbar region without neurogenic claudication (M48.061)  Lumbar foraminal stenosis (M48.061)  Lumbar disc disease (M51.9)  Lumbar herniated disc (M51.26)  Spondylolisthesis of lumbar region (M43.16)  Lumbar radiculopathy (M54.16)  Referring Physician:  Ryan Orr MD visit: 6/18/2024  Initial Evaluation Date: 3/28/2024  Date of Surgery: None for this diagnosis  Insurance: Medicare  Authorized [place] of visits:  [place] by 1/30/2025  Plan of care to: 6/26/2024    Precautions/Hx: R torn meniscus of knee; atherosclerosis; diverticulosis; GERD; hiatal hernia; HLD; prediabetes; glaucoma; prostatectomy; thoracic and lumbar spondylosis     Past medical history was reviewed with Lb.   Past Medical History:   Diagnosis Date    Abnormal Heart Score CT 07/2020    calcium heart score 277    Abnormal MRI, knee 05/2017    MRI R knee 5/2017 per Dr. Heath-medial meniscal tear and MCL sprain. physical therapy.    Acute torn meniscus of knee     RIGHT KNEE    Allergic rhinitis     Atherosclerosis of coronary artery Partial blocked artery    Back problem     Mccrary esophagus 1995    No symptoms in last 10 years    Cancer (HCC) 2000    prostate    Colon polyps 2022    repeat CLN in 2025    Coronary atherosclerosis     Diverticular disease Unknown    Diverticulosis     seen on colonoscopy    GERD (gastroesophageal reflux disease)     EGD 10/30/17 Dr Kapoor at [facility]--poss short segment barretts--bx however neg.     H/O colonoscopy 06/11/2012    cscopy Dr Kapoor -no recurrent polyps, sigmoid diverticulosis and int hemorrhoids.     Hearing loss     hearing aides    Heart disease 2019    Moderate blockage in one artery    Hemorrhoids 2017    Herpes labialis     Hiatal hernia 1999    EGD 1999, 2002, 2007  Dr Kapoor     High cholesterol     Hyperlipidemia     Kidney stones 1980    removed via cystoscopy at [facility] in [facility]  1980    Lumbar degenerative disc disease     Dr Swain did LESIs 2013, 2014, 2015; has seen Dr. Hwang and has seen Dr. Marie neurosurg at [facility].     Lumbar herniated disc 1976    was hosp in [place] and had back brace in 1976 after hurt back taboganning    Mild CAD 2020    Cardiac cath 8/4/20 Dr Salguero--50% LAD stenosis. medical mgmt.    Ocular hypertension of right eye 03/2016    taking Latanoprost OU qhs     Pinguecula of both eyes 2007    Prediabetes 2015    A1c of 5.8 in 1/2015    Primary open angle glaucoma (POAG) of right eye 06/13/2018 6/14/18 new diagnosis of COAG OD- based on progression of OCT OD- patient already was on Latanoprost qhsOU - previously treated for OHT OD    Primary open angle glaucoma of left eye 03/08/2016    3/8/16 started on Latanoprost qhs OU by [facility]- due to OHT OU (36/41)and thinning of OCT OS- treating OHT OD and COAG OS    Prostate cancer (HCC) 2000    prostatectomy at [facility]--2000-Dr Lenz    Skin lesion of face 2015    sees derm Dr. Antonio in [place]    Superficial punctate keratitis 2010    left    Thoracic spondylosis      Past Surgical History:   Procedure Laterality Date    CATARACT Left 02/01/2019    [facility]    CATARACT Right 03/01/2019    COLONOSCOPY  2007 2013    Dr Kapoor at [place]    OTHER      egd - 11-99,5-02,6-07-Dr Kapoor    OTHER SURGICAL HISTORY  1980    cystoscopy to remove kidney stones - comm hosp     REMV PROSTATE,RETROPUB,RADICAL  2000    prostatectomy for prostate cancer Dr. Lenz at [facility]    SIGMOIDOSCOPY,DIAGNOSTIC  Previous annual physicals    TONSILLECTOMY  1947    age 7       PATIENT SUMMARY   Lb Pimentel is a 84 year old y/o male who presents to therapy today with complaints of tension pain across the lower back and sacrum. He will worsen with doing activities in his garage.  He has been able to continue walking over 10,000 steps per day.  He is not having pain into the LE\"s as he did before.  He will have rare  paraesthesias after doing too much activity.  He continues to get some relief w flexion and hamstring stretching.  History of current condition: started 5/2021, continues to have flare ups.   Current functional limitations include, but are not limited to lifting, playing golf, washing dishes, carry on stairs.   Prior treatments: PT in past that helped  Recent accidents or falls: jake Asher describes prior level of function as able to perform all desired ADL's without difficulty. Pt goals included as physical therapy goals below.     ASSESSMENT:   Pt presents with subjective complaints and functional limitations as noted above.  Pt's function and objective finding are consistent with physician's diagnosis.  The results of the objective tests and measures demonstrate the following limitations: gait deviations; postural alterations; decreased lumbar ROM; decreased B hip IR ROM; weakness in R ankle DF and EHL; and decreased B LE flexibility.     Pt and PT discussed evaluation findings, pathology, POC and HEP.  Pt voiced understanding and performs HEP correctly without reported pain. Skilled Physical Therapy is medically necessary to address the above impairments and reach functional goals.    In agreement with functional score and clinical rationale, this evaluation involved Moderate Complexity decision making due to 1-2 personal factors/comorbidities, 3 body structures involved/activity limitations, and evolving symptoms including changing pain levels.     SUBJECTIVE:     Visit count 1/8     Date 3/28/2024     LBP      Pain Range 0 to 3/10     Pain Ave 2/10     Pain Current 2/10       Better: sitting, lying in recliner  Sensation: none  Night: falls asleep - rarely taking Advil PM q 2 weeks; Position - sides; Hours of sleep - 7-8; Wakes - 2--3x for bathroom; Sweats - no.  Incontinence: due to prostate surg 20 years ago, only rarely at night  Saddle Anesthesia: none  GI: none  Stress: min  Work:  retired  Living  environment: home w wife  Stairs: light to bedroom, and basement, does 6-13 flights/day  Unexplained wt loss: no  Increased pain w coughing, sneezing, straining in the bathroom: no  Blood thinners: no  Diabetes: no  Latex Allergy: no  Pacemaker: no     Are you being hurt, frightened, demeaned, or taken advantage of by anyone at your home or in your life? No   Have you recently had thoughts of hurting yourself? No   Have you tried to hurt yourself in the past? No     OBJECTIVE:     Objective Initial Evaluation Data 3/28/2024:    Oswestry Disability Index Score  Score: 0 % (3/21/2024 10:17 AM)    Gait:        Deviations: hip trunk flex, pt notes some foot drop with prolonged walking       Devices: none       Assistance: none      Posture 3/28/2024   Alignment Min R>L foot pronation, flattened lumbar spine, mild apparent R lumbar scoliosis       Sensation 3/28/2024   Dermatomes Light Touch    Proximal medial thigh R (L2)  wnl   Proximal medial thigh L (L2) wnl   Above knee R (L3) wnl   Above knee L (L3) wnl   Medial arch R(L4) wnl   Medial arch L (L4) wnl   Between 1st - 2nd toes R (L5) wnl   Between 1st - 2nd toes L (L5) wnl   Lateral border of foot R (S1) wnl   Lateral border of foot L (S1) wnl   Popliteal fossa R (S2) wnl   Popliteal fossa L(S2) wnl       Abdominals 3/28/2024   Tone  good       ROM Lumbar 3/28/2024   Flexion Mod w hams limit   Extension max   R SB mod   L SB mod   R Rotation Min-mod   L Rotation Min-mod   * pain limiting    ROM Hip 3/28/2024   Flex R 125 140   Flex L 125 133   ER R 45 45   ER L 45 45   IR R 40 21   IR L 40 22   *pain limiting     MMT 5/5 3/28/2024   L2- hip flex R 5   Hip flex L 5   L3- knee ext R 5   Knee ext L 5   L4- ankle DF R 4-   Ankle DF L 5   L5- EHL R 4-   EHL L 5   S1- ankle PF R 5   Ankle PF L 5   S2- Hams R 5   Hams L 5   *pain limiting    LE flexibility 3/28/2024   Piriformis R mod   Piriformis L Mod-max   Hams R -43   Hams L -40   *pain limiting    Neural mobility  3/28/2024   SLR R (-) 60 deg   SLR L (-) 60 deg      ELISSA 3/28/2024   R (-) min-mod ROM loss   L (-) mod ROM loss        Imaging:     PROCEDURE: MRI SPINE LUMBAR (CPT=72148)     COMPARISON: [facility], X LUMBAR SPINE AP LAT FL EX, 5/12/2014, 15:08.  [facility], MRI LUMBAR SPINE WO CONTRAST, 5/23/2013, 18:56.     INDICATIONS: Chronic lower back pain with right-sided sciatica. Radicular symptomatology radiating to the right lower leg. No trauma. (M51.26, M51.36)     TECHNIQUE: A variety of imaging planes and parameters were utilized for visualization of suspected pathology.     FINDINGS:  NUMERATION: Partial transitional lumbosacral anatomy is noted with lumbarization of the S1 vertebral body. 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 A rudimentary  disc is appreciated between the transitional body and the remainder of the sacral spine. For the purposes of this examination, the lowest fully formed disc space is designated as L5-S1. The transitional body is designated S1 with the rudimentary disc  designated S1-S2. Images have been extensively annotated to reflect the numeration scheme.  ALIGNMENT: Mild dextroscoliosis is noted. There is trace interruption of the expected lumbar lordosis by trace anterolisthesis of L4 on L5.  BONES: No fracture or suspicious osseous lesion is evident. Multilevel anterior osteophyte formation is seen. Endplate signal abnormalities are likely degenerative in nature.  CORD/CAUDA EQUINA: The conus medullaris terminates at the level of the L2 vertebral segment. The distal cord and nerve roots have normal caliber, contour, and signal intensity.  PARASPINAL AREA: No visible mass.    OTHER: Layering calculi or debris suggested within the gallbladder lumen. Scattered colonic diverticula are present in the sigmoid colon. There is no colonic wall thickening or  pericolonic fat stranding. There are well-circumscribed T2 hyperintense  probable peripelvic cysts of the left kidney.     LUMBAR DISC LEVELS:  L1-L2: Suboptimally assessed and not entirely included in the axial images. A tiny annular fissure is suggested.  L2-L3: A diffuse disc bulge is apparent. There is mild hypertrophic facet arthropathy with slight buckling of the ligamentum flavum. These findings cause moderate spinal canal narrowing bilateral subarticular zone encroachment with moderate to severe  bilateral foraminal impingement.  L3-L4: A diffuse disc bulge is demonstrated at superimposed broad-based central and bilateral foraminal protrusions (left greater than right). There is hypertrophic facet arthrosis with slight buckling of ligamentum flavum and mild prominence of the  dorsal epidural fat. These findings result in moderate to severe spinal canal stenosis and bilateral subarticular zone impingement of the transiting L4 nerve roots and moderate bilateral foraminal stenoses involving the exiting L3 nerve roots.  L4-L5: A diffuse disc bulge is manifested with a small broad-based central protrusion and right greater than left foraminal protrusion. Hypertrophic facet arthropathy is manifested with associated buckling of ligamentum flavum. These findings result in  mild spinal canal narrowing with bilateral subarticular zone impingement of the descending L5 nerve roots. There is also severe right and moderate left foraminal encroachment.  L5-S1: A diffuse disc bulge is observed superimposed broad-based central protrusion. There is facet arthrosis bilaterally. These findings cause asymmetric severe right foraminal narrowing with moderate left foraminal impingement.        Dictated by (CST): Lauro Alonso MD on 1/10/2018 at 13:21      Approved by (CST): Lauro Alonso MD on 1/10/2018 at 13:33         Impression   CONCLUSION:  1. Mild scoliosis and multilevel degenerative changes of the lumbar spine with  moderate to severe spinal canal stenosis at L3-L4, moderate spinal canal stenosis at L2-L3, and mild spinal canal stenosis at L4-L5.     2. Multilevel subarticular zone and foraminal encroachment, as above described, with asymmetric severe foraminal impingement at L4-L5 and L5-S1.     3. Transitional lumbosacral anatomy is present.     4. Cholelithiasis or debris within the gallbladder.     5. Lesser incidental findings as above.         Treatment performed this date:    Therapeutic Exercise:  Visit #   1/8   Position Exercise HEP 3/28/2024   Supine Cross leg piriformis H X     Cross leg hip ER H X    Leg pumps H X    Sitting Piriformis stretch H X    Hip ER stretch H X    Hams stretch heel on floor H X   H = HEP. Pt given copies of this exercise for home program.  X = Exercises done this date - pt verbalized understanding and demonstrated competence. All exercises done B unless otherwise indicated.   Pt advised to discontinue exercises that increase pain and to call or return to therapist to discuss.  Each intervention above is specifically prescribed to address the patient's identified impairments, activity limitations, and participation restrictions.      Physical Therapy Goals: From 3/28/2024 to 6/26/2024  - Created with patient input during initial evaluation   1. Pt will be independent in beginning level of HEP for stretching, posture and strengthening.   2. Pt will be able to play golf for 18 holes using a cart without increased symptoms.  3. Pt will be able to lift 25# without increased symptoms.  4. Pt will be able to climb stairs while carrying without increased symptoms.  5. Pt will be able to increase hams flexibility by 20% to allow for improved gait pattern without increased symptoms.  6. Pt will be able to wash dishes for 15 min without increased symptoms.      PLAN OF CARE:      Frequency / Duration: Patient will be seen for 1-2x/week decreasing to every other week for a total of 18 visits over a 90  day period for therapeutic exercises, posture retraining, therapeutic activities, manual treatment, neuromuscular reeducation, therapeutic pain neuroscience education, patient education, modalities as needed, home exercise program.    Education or treatment limitation: None  Rehab Potential:good    Patient was advised of these findings, precautions, goals of treatment, plan of care, beginning self care and treatment options and has agreed to actively participate in planning and for this course of care. Pt educated on possible soreness after evaluation w use of modalities as needed (ice/heat), postural corrections and the importance of staying active.    Charges: PT Eval 2, TE 1    Total Timed treatment: 12 min      Total Treatment Time: 45 min    Thank you for your referral. Please co-sign or sign and return this letter via fax as soon as possible to [phone]. If you have any questions, please contact me at Dept: [phone]    Sincerely,  Electronically signed by therapist: Debi Burk PT    Physician's certification required: Yes  I certify the need for these services furnished under this plan of treatment and while under my care.    X___________________________________________________ Date____________________    Certification From: 3/28/2024  To:6/26/2024        __________________________________________________________________________________________      21st Century Cures Act Notice to Patient: Medical documents like this are made available to patients in the interest of transparency. However, be advised this is a medical document and it is intended as [id number] communication between your medical providers. This medical document may contain abbreviations, assessments, medical data, and results or other terms that are unfamiliar. Medical documents are intended to carry relevant information, facts as evident, and the clinical opinion of the practitioner. As such, this medical document may be written in  language that appears blunt or direct. You are encouraged to contact your medical provider and/or [facility] Patient Experience if you have any questions about this medical document.

## 2024-04-02 ENCOUNTER — OFFICE VISIT (OUTPATIENT)
Dept: PHYSICAL THERAPY | Facility: HOSPITAL | Age: 84
End: 2024-04-02
Attending: PHYSICAL MEDICINE & REHABILITATION
Payer: MEDICARE

## 2024-04-02 PROCEDURE — 97112 NEUROMUSCULAR REEDUCATION: CPT

## 2024-04-02 PROCEDURE — 97110 THERAPEUTIC EXERCISES: CPT

## 2024-04-02 PROCEDURE — 97530 THERAPEUTIC ACTIVITIES: CPT

## 2024-04-02 NOTE — PROGRESS NOTES
Lb Pimentel    1/29/1940  Diagnosis: Spinal stenosis of lumbar region without neurogenic claudication (M48.061)  Lumbar foraminal stenosis (M48.061)  Lumbar disc disease (M51.9)  Lumbar herniated disc (M51.26)  Spondylolisthesis of lumbar region (M43.16)  Lumbar radiculopathy (M54.16)  Referring Physician:  Ryan Orr MD visit: 6/18/2024  Initial Evaluation Date: 3/28/2024  Date of Surgery: None for this diagnosis  Insurance: Medicare  [place]ized [place] of visits:  [place] 1/30/2025  Plan of care to: 6/26/2024    Precautions/Hx: R torn meniscus of knee; atherosclerosis; diverticulosis; GERD; hiatal hernia; HLD; prediabetes; glaucoma; prostatectomy; thoracic and lumbar spondylosis     SUBJECTIVE:     Pt reports that he continues to be challenged to get a good routine to his exercise program so he is consistent.      Visit count 1/8 2/8    Date 3/28/2024 4/2/2024     LBP      Pain Range 0 to 3/10 0-3/10    Pain Ave 2/10 2/10    Pain Current 2/10 2/10         OBJECTIVE:     Treatment performed this date:    Therapeutic Exercise:  Visit #   1/8 2/8   Position Exercise HEP 3/28/2024 4/2/2024    Supine Cross leg piriformis H X  3x    Cross leg hip ER H X 3x    Leg pumps H X  10x    Hip flexor stretch foot on stool H  X 4x 1 min    LTR H  X     DKC H  X     UE/LE ispi reach H  X    Hams stretch doorway H  10x   Sitting Piriformis stretch H X     Hip ER stretch H X     Hams stretch heel on floor H X X 3x15s    Trunk flex H  X 5x   Standing Hams stretch foot on stool H  X 3x15s   Prone Lying    X 1 min    Bent knee hip extn   X 5x   Neuro Re-ed   X see assessment    Ther Act Function   X    H = HEP. Pt given copies of this exercise for home program.  X = Exercises done this date - pt verbalized understanding and demonstrated competence. All exercises done B unless otherwise indicated.   Pt advised to discontinue exercises that increase pain and to call or return to therapist to discuss.  Each intervention above is  specifically prescribed to address the patient's identified impairments, activity limitations, and participation restrictions.     ASSESSMENT:     Pt continues to do well with low level of pain.  He remains challenged to be consistent w his HEP.  Pt brought in his binder of previous HEP's.  Sorted through 50% and organized to perform higher level skipping lower level exercises w the same goal.  Pt educate on goal of each w notes made for recall.  Pt asking good clarifying questions about self care and HEP. Pt educated on the importance of pacing activities to avoid overdoing w boom and bust to allow continued progression of exercise and functional activities.  Pt verbalized understanding.  Discussed use of standing ham stretch holding onto golf cart to allow pt to continue his chosen recreation without having a flare up.  Pt given many options for hams stretching throughout his day. Continue to discuss importance of avoiding radicular symptoms.  Pt demonstrates B hip flexor and quad loss of flexibility and did well with progressive stretching w use of stool and elevating plinth.  Pt has mild loss of hip extn strength.     Physical Therapy Goals: From 3/28/2024 to 6/26/2024  - Created with patient input during initial evaluation   1. Pt will be independent in beginning level of HEP for stretching, posture and strengthening.   2. Pt will be able to play golf for 18 holes using a cart without increased symptoms.  3. Pt will be able to lift 25# without increased symptoms.  4. Pt will be able to climb stairs while carrying without increased symptoms.  5. Pt will be able to increase hams flexibility by 20% to allow for improved gait pattern without increased symptoms.  6. Pt will be able to wash dishes for 15 min without increased symptoms.      PLAN OF CARE:      Continue PT per original plan for therapeutic exercises, posture retraining, therapeutic activities, manual treatment, neuromuscular reeducation, therapeutic  pain neuroscience education, patient education, self care home management, home exercise program, and modalities as needed.    Charged Units Units Minutes   Ther Ex 2 35   Neuro 1 10   Ther Activity 1 10   Gait     Man Tx          Total Timed  55   Total Tx Time  55           __________________________________________________________________________________________      21st Century Cures Act Notice to Patient: Medical documents like this are made available to patients in the interest of transparency. However, be advised this is a medical document and it is intended as [id number] communication between your medical providers. This medical document may contain abbreviations, assessments, medical data, and results or other terms that are unfamiliar. Medical documents are intended to carry relevant information, facts as evident, and the clinical opinion of the practitioner. As such, this medical document may be written in language that appears blunt or direct. You are encouraged to contact your medical provider and/or [facility] Patient Experience if you have any questions about this medical document.    Objective Initial Evaluation Data 3/28/2024:    Oswestry Disability Index Score  Score: 0 % (3/21/2024 10:17 AM)    Gait:        Deviations: hip trunk flex, pt notes some foot drop with prolonged walking       Devices: none       Assistance: none      Posture 3/28/2024   Alignment Min R>L foot pronation, flattened lumbar spine, mild apparent R lumbar scoliosis       Sensation 3/28/2024   Dermatomes Light Touch    Proximal medial thigh R (L2)  wnl   Proximal medial thigh L (L2) wnl   Above knee R (L3) wnl   Above knee L (L3) wnl   Medial arch R(L4) wnl   Medial arch L (L4) wnl   Between 1st - 2nd toes R (L5) wnl   Between 1st - 2nd toes L (L5) wnl   Lateral border of foot R (S1) wnl   Lateral border of foot L (S1) wnl   Popliteal fossa R (S2) wnl   Popliteal fossa L(S2) wnl       Abdominals 3/28/2024   Tone   good       ROM Lumbar 3/28/2024   Flexion Mod w hams limit   Extension max   R SB mod   L SB mod   R Rotation Min-mod   L Rotation Min-mod   * pain limiting    ROM Hip 3/28/2024   Flex R 125 140   Flex L 125 133   ER R 45 45   ER L 45 45   IR R 40 21   IR L 40 22   *pain limiting     MMT 5/5 3/28/2024   L2- hip flex R 5   Hip flex L 5   L3- knee ext R 5   Knee ext L 5   L4- ankle DF R 4-   Ankle DF L 5   L5- EHL R 4-   EHL L 5   S1- ankle PF R 5   Ankle PF L 5   S2- Hams R 5   Hams L 5        4/2/2024    Hip extn R 4+   Hip extn L 4+   *pain limiting    LE flexibility 3/28/2024   Piriformis R mod   Piriformis L Mod-max   Hams R -43   Hams L -40        4/2/2024    Hip flexor R Min-mod (-5)   Hip flexor L Mod (-10)   Quads R mod   Quads L mod   *pain limiting    Neural mobility 3/28/2024   SLR R (-) 60 deg   SLR L (-) 60 deg      ELISSA 3/28/2024   R (-) min-mod ROM loss   L (-) mod ROM loss        Imaging:     PROCEDURE: MRI SPINE LUMBAR (CPT=72148)     COMPARISON: [facility], X LUMBAR SPINE AP LAT FL EX, 5/12/2014, 15:08.  [facility], MRI LUMBAR SPINE WO CONTRAST, 5/23/2013, 18:56.     INDICATIONS: Chronic lower back pain with right-sided sciatica. Radicular symptomatology radiating to the right lower leg. No trauma. (M51.26, M51.36)     TECHNIQUE: A variety of imaging planes and parameters were utilized for visualization of suspected pathology.     FINDINGS:  NUMERATION: Partial transitional lumbosacral anatomy is noted with lumbarization of the S1 vertebral body. 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 A rudimentary  disc is appreciated between the transitional body and the remainder of the sacral spine. For the purposes of this examination, the lowest fully formed disc space is designated as L5-S1. The transitional body is designated S1 with the rudimentary disc  designated S1-S2.  Images have been extensively annotated to reflect the numeration scheme.  ALIGNMENT: Mild dextroscoliosis is noted. There is trace interruption of the expected lumbar lordosis by trace anterolisthesis of L4 on L5.  BONES: No fracture or suspicious osseous lesion is evident. Multilevel anterior osteophyte formation is seen. Endplate signal abnormalities are likely degenerative in nature.  CORD/CAUDA EQUINA: The conus medullaris terminates at the level of the L2 vertebral segment. The distal cord and nerve roots have normal caliber, contour, and signal intensity.  PARASPINAL AREA: No visible mass.    OTHER: Layering calculi or debris suggested within the gallbladder lumen. Scattered colonic diverticula are present in the sigmoid colon. There is no colonic wall thickening or pericolonic fat stranding. There are well-circumscribed T2 hyperintense  probable peripelvic cysts of the left kidney.     LUMBAR DISC LEVELS:  L1-L2: Suboptimally assessed and not entirely included in the axial images. A tiny annular fissure is suggested.  L2-L3: A diffuse disc bulge is apparent. There is mild hypertrophic facet arthropathy with slight buckling of the ligamentum flavum. These findings cause moderate spinal canal narrowing bilateral subarticular zone encroachment with moderate to severe  bilateral foraminal impingement.  L3-L4: A diffuse disc bulge is demonstrated at superimposed broad-based central and bilateral foraminal protrusions (left greater than right). There is hypertrophic facet arthrosis with slight buckling of ligamentum flavum and mild prominence of the  dorsal epidural fat. These findings result in moderate to severe spinal canal stenosis and bilateral subarticular zone impingement of the transiting L4 nerve roots and moderate bilateral foraminal stenoses involving the exiting L3 nerve roots.  L4-L5: A diffuse disc bulge is manifested with a small broad-based central protrusion and right greater than left foraminal  protrusion. Hypertrophic facet arthropathy is manifested with associated buckling of ligamentum flavum. These findings result in  mild spinal canal narrowing with bilateral subarticular zone impingement of the descending L5 nerve roots. There is also severe right and moderate left foraminal encroachment.  L5-S1: A diffuse disc bulge is observed superimposed broad-based central protrusion. There is facet arthrosis bilaterally. These findings cause asymmetric severe right foraminal narrowing with moderate left foraminal impingement.        Dictated by (CST): Lauro Alonso MD on 1/10/2018 at 13:21      Approved by (CST): Lauro Alonso MD on 1/10/2018 at 13:33         Impression   CONCLUSION:  1. Mild scoliosis and multilevel degenerative changes of the lumbar spine with moderate to severe spinal canal stenosis at L3-L4, moderate spinal canal stenosis at L2-L3, and mild spinal canal stenosis at L4-L5.     2. Multilevel subarticular zone and foraminal encroachment, as above described, with asymmetric severe foraminal impingement at L4-L5 and L5-S1.     3. Transitional lumbosacral anatomy is present.     4. Cholelithiasis or debris within the gallbladder.     5. Lesser incidental findings as above.

## 2024-04-09 ENCOUNTER — OFFICE VISIT (OUTPATIENT)
Dept: PHYSICAL THERAPY | Facility: HOSPITAL | Age: 84
End: 2024-04-09
Attending: PHYSICAL MEDICINE & REHABILITATION
Payer: MEDICARE

## 2024-04-09 PROCEDURE — 97110 THERAPEUTIC EXERCISES: CPT

## 2024-04-09 PROCEDURE — 97112 NEUROMUSCULAR REEDUCATION: CPT

## 2024-04-09 NOTE — PROGRESS NOTES
Lb Pimentel    1/29/1940  Diagnosis: Spinal stenosis of lumbar region without neurogenic claudication (M48.061)  Lumbar foraminal stenosis (M48.061)  Lumbar disc disease (M51.9)  Lumbar herniated disc (M51.26)  Spondylolisthesis of lumbar region (M43.16)  Lumbar radiculopathy (M54.16)  Referring Physician:  Ryan Orr MD visit: 6/18/2024  Initial Evaluation Date: 3/28/2024  Date of Surgery: None for this diagnosis  Insurance: Medicare  [place]ized [place] of visits:  [place] by 1/30/2025  Plan of care to: 6/26/2024    Precautions/Hx: R torn meniscus of knee; atherosclerosis; diverticulosis; GERD; hiatal hernia; HLD; prediabetes; glaucoma; prostatectomy; thoracic and lumbar spondylosis     SUBJECTIVE:     Pt reports that he is doing well with little to no pain.  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  He feels that his legs are getting more flexible.      Visit count 1/8 2/8 3/8   Date 3/28/2024 4/2/2024  4/9/2024    LBP      Pain Range 0 to 3/10 0-3/10 0-3/10   Pain Ave 2/10 2/10 1-2/10   Pain Current 2/10 2/10 0-1/10        OBJECTIVE:     Treatment performed this date:    Therapeutic Exercise:  Visit #   1/8 2/8 3/8   Position Exercise HEP 3/28/2024 4/2/2024  4/9/2024    Supine Cross leg piriformis H X  3x     Cross leg hip ER H X 3x     Leg pumps H X  10x     Hip flexor stretch foot on stool H  X 4x 1 min     Hip flexor stretch quad stretch        LTR H  X      DKC H  X      UE/LE ispi reach H  X     Hams stretch doorway H  10x     Bridging on bolster    5x   Sitting Piriformis stretch H X      Hip ER stretch H X      Hams stretch heel on floor H X X 3x15s 3x    Trunk flex H  X 5x 5x   Standing Hams stretch foot on stool H  X 3x15s    Prone Lying    X 1 min     Bent knee hip extn   X 5x 5x    Hip flexor stretch    X 1x45s    Hip flexor and quad stretch    X 10x    Hip flexor, quad stretch w on elbows    X 5x    Hip flex, quad stretch w strap pull    X 1x1 min   Neuro Re-ed   X see  assessment  X see assessment    Ther Act Function   X     H = HEP. Pt given copies of this exercise for home program.  X = Exercises done this date - pt verbalized understanding and demonstrated competence. All exercises done B unless otherwise indicated.   Pt advised to discontinue exercises that increase pain and to call or return to therapist to discuss.  Each intervention above is specifically prescribed to address the patient's identified impairments, activity limitations, and participation restrictions.     ASSESSMENT:     Pt continues to progress with less pain.  He is asking good clarifying questions about his HEP.  He remains very active doing work projects and getting in many steps. He has signed up for a trip to [place], [place] and [place].  Pt advised that he is generally stiff and will need to focus his HEP on gaining and maintain flexibility is the goal.  Pt educated on finding interbarriel zone for gaining myofascial release.  Pt educated on benefit of maintaining lumbar extension mobility without triggering any radicular symptoms.  Pt progressed well through hip flexor and quad stretching.  Pt brought in his HEP book - multiple modifications and corrections of duplicates or lower level exercises removed.  Pt continues to ask good questions and is using good understanding to integrate exercise into his daily activities.  Continue to discuss pacing and altering activities to reduce risk of flare up in this pt w significant stenosis.      Physical Therapy Goals: From 3/28/2024 to 6/26/2024  - Created with patient input during initial evaluation   1. Pt will be independent in beginning level of HEP for stretching, posture and strengthening.   2. Pt will be able to play golf for 18 holes using a cart without increased symptoms.  3. Pt will be able to lift 25# without increased symptoms.  4. Pt will be able to climb stairs while carrying without increased symptoms.  5. Pt will be able to increase hams  flexibility by 20% to allow for improved gait pattern without increased symptoms.  6. Pt will be able to wash dishes for 15 min without increased symptoms.      PLAN OF CARE:      Continue PT per original plan for therapeutic exercises, posture retraining, therapeutic activities, manual treatment, neuromuscular reeducation, therapeutic pain neuroscience education, patient education, self care home management, home exercise program, and modalities as needed.    Charged Units Units Minutes    Ther Ex 2 35   Neuro 2 20   Ther Activity     Gait     Man Tx          Total Timed  55   Total Tx Time  55           __________________________________________________________________________________________      21st Century Cures Act Notice to Patient: Medical documents like this are made available to patients in the interest of transparency. However, be advised this is a medical document and it is intended as qhuq-kr-popw communication between your medical providers. This medical document may contain abbreviations, assessments, medical data, and results or other terms that are unfamiliar. Medical documents are intended to carry relevant information, facts as evident, and the clinical opinion of the practitioner. As such, this medical document may be written in language that appears blunt or direct. You are encouraged to contact your medical provider and/or [facility] Patient Experience if you have any questions about this medical document.    Objective Initial Evaluation Data 3/28/2024:    Oswestry Disability Index Score  Score: 0 % (3/21/2024 10:17 AM)    Gait:        Deviations: hip trunk flex, pt notes some foot drop with prolonged walking       Devices: none       Assistance: none      Posture 3/28/2024   Alignment Min R>L foot pronation, flattened lumbar spine, mild apparent R lumbar scoliosis       Sensation 3/28/2024   Dermatomes Light Touch    Proximal medial thigh R (L2)  wnl   Proximal medial thigh L (L2)  wnl   Above knee R (L3) wnl   Above knee L (L3) wnl   Medial arch R(L4) wnl   Medial arch L (L4) wnl   Between 1st - 2nd toes R (L5) wnl   Between 1st - 2nd toes L (L5) wnl   Lateral border of foot R (S1) wnl   Lateral border of foot L (S1) wnl   Popliteal fossa R (S2) wnl   Popliteal fossa L(S2) wnl       Abdominals 3/28/2024   Tone  good       ROM Lumbar 3/28/2024   Flexion Mod w hams limit   Extension max   R SB mod   L SB mod   R Rotation Min-mod   L Rotation Min-mod   * pain limiting    ROM Hip 3/28/2024   Flex R 125 140   Flex L 125 133   ER R 45 45   ER L 45 45   IR R 40 21   IR L 40 22   *pain limiting     MMT 5/5 3/28/2024   L2- hip flex R 5   Hip flex L 5   L3- knee ext R 5   Knee ext L 5   L4- ankle DF R 4-   Ankle DF L 5   L5- EHL R 4-   EHL L 5   S1- ankle PF R 5   Ankle PF L 5   S2- Hams R 5   Hams L 5        4/2/2024    Hip extn R 4+   Hip extn L 4+   *pain limiting    LE flexibility 3/28/2024   Piriformis R mod   Piriformis L Mod-max   Hams R -43   Hams L -40        4/2/2024    Hip flexor R Min-mod (-5)   Hip flexor L Mod (-10)   Quads R mod   Quads L mod   *pain limiting    Neural mobility 3/28/2024   SLR R (-) 60 deg   SLR L (-) 60 deg      ELISSA 3/28/2024   R (-) min-mod ROM loss   L (-) mod ROM loss        Imaging:     PROCEDURE: MRI SPINE LUMBAR (CPT=72148)     COMPARISON: [facility], X LUMBAR SPINE AP LAT FL EX, 5/12/2014, 15:08.  [facility], MRI LUMBAR SPINE WO CONTRAST, 5/23/2013, 18:56.     INDICATIONS: Chronic lower back pain with right-sided sciatica. Radicular symptomatology radiating to the right lower leg. No trauma. (M51.26, M51.36)     TECHNIQUE: A variety of imaging planes and parameters were utilized for visualization of suspected pathology.     FINDINGS:  NUMERATION: Partial transitional lumbosacral anatomy is noted with lumbarization of the S1 vertebral body. 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 A rudimentary  disc is appreciated between the transitional body and the remainder of the sacral spine. For the purposes of this examination, the lowest fully formed disc space is designated as L5-S1. The transitional body is designated S1 with the rudimentary disc  designated S1-S2. Images have been extensively annotated to reflect the numeration scheme.  ALIGNMENT: Mild dextroscoliosis is noted. There is trace interruption of the expected lumbar lordosis by trace anterolisthesis of L4 on L5.  BONES: No fracture or suspicious osseous lesion is evident. Multilevel anterior osteophyte formation is seen. Endplate signal abnormalities are likely degenerative in nature.  CORD/CAUDA EQUINA: The conus medullaris terminates at the level of the L2 vertebral segment. The distal cord and nerve roots have normal caliber, contour, and signal intensity.  PARASPINAL AREA: No visible mass.    OTHER: Layering calculi or debris suggested within the gallbladder lumen. Scattered colonic diverticula are present in the sigmoid colon. There is no colonic wall thickening or pericolonic fat stranding. There are well-circumscribed T2 hyperintense  probable peripelvic cysts of the left kidney.     LUMBAR DISC LEVELS:  L1-L2: Suboptimally assessed and not entirely included in the axial images. A tiny annular fissure is suggested.  L2-L3: A diffuse disc bulge is apparent. There is mild hypertrophic facet arthropathy with slight buckling of the ligamentum flavum. These findings cause moderate spinal canal narrowing bilateral subarticular zone encroachment with moderate to severe  bilateral foraminal impingement.  L3-L4: A diffuse disc bulge is demonstrated at superimposed broad-based central and bilateral foraminal protrusions (left greater than right). There is hypertrophic facet arthrosis with slight buckling of ligamentum flavum and mild prominence of the  dorsal epidural fat. These  findings result in moderate to severe spinal canal stenosis and bilateral subarticular zone impingement of the transiting L4 nerve roots and moderate bilateral foraminal stenoses involving the exiting L3 nerve roots.  L4-L5: A diffuse disc bulge is manifested with a small broad-based central protrusion and right greater than left foraminal protrusion. Hypertrophic facet arthropathy is manifested with associated buckling of ligamentum flavum. These findings result in  mild spinal canal narrowing with bilateral subarticular zone impingement of the descending L5 nerve roots. There is also severe right and moderate left foraminal encroachment.  L5-S1: A diffuse disc bulge is observed superimposed broad-based central protrusion. There is facet arthrosis bilaterally. These findings cause asymmetric severe right foraminal narrowing with moderate left foraminal impingement.        Dictated by (CST): Lauro Alonso MD on 1/10/2018 at 13:21      Approved by (CST): Lauro Alonso MD on 1/10/2018 at 13:33         Impression   CONCLUSION:  1. Mild scoliosis and multilevel degenerative changes of the lumbar spine with moderate to severe spinal canal stenosis at L3-L4, moderate spinal canal stenosis at L2-L3, and mild spinal canal stenosis at L4-L5.     2. Multilevel subarticular zone and foraminal encroachment, as above described, with asymmetric severe foraminal impingement at L4-L5 and L5-S1.     3. Transitional lumbosacral anatomy is present.     4. Cholelithiasis or debris within the gallbladder.     5. Lesser incidental findings as above.

## 2024-04-16 ENCOUNTER — APPOINTMENT (OUTPATIENT)
Dept: PHYSICAL THERAPY | Facility: HOSPITAL | Age: 84
End: 2024-04-16
Attending: PHYSICAL MEDICINE & REHABILITATION
Payer: MEDICARE

## 2024-04-17 ENCOUNTER — TELEPHONE (OUTPATIENT)
Dept: PHYSICAL THERAPY | Facility: HOSPITAL | Age: 84
End: 2024-04-17

## 2024-04-23 ENCOUNTER — APPOINTMENT (OUTPATIENT)
Dept: PHYSICAL THERAPY | Facility: HOSPITAL | Age: 84
End: 2024-04-23
Attending: PHYSICAL MEDICINE & REHABILITATION
Payer: MEDICARE

## 2024-04-26 ENCOUNTER — APPOINTMENT (OUTPATIENT)
Dept: PHYSICAL THERAPY | Facility: HOSPITAL | Age: 84
End: 2024-04-26
Attending: PHYSICAL MEDICINE & REHABILITATION
Payer: MEDICARE

## 2024-04-29 ENCOUNTER — TELEPHONE (OUTPATIENT)
Dept: PHYSICAL THERAPY | Facility: HOSPITAL | Age: 84
End: 2024-04-29

## 2024-04-30 ENCOUNTER — APPOINTMENT (OUTPATIENT)
Dept: PHYSICAL THERAPY | Facility: HOSPITAL | Age: 84
End: 2024-04-30
Attending: PHYSICAL MEDICINE & REHABILITATION
Payer: MEDICARE

## 2024-06-03 ENCOUNTER — APPOINTMENT (OUTPATIENT)
Dept: PHYSICAL THERAPY | Facility: HOSPITAL | Age: 84
End: 2024-06-03
Attending: PHYSICAL MEDICINE & REHABILITATION
Payer: MEDICARE

## 2024-06-04 ENCOUNTER — TELEPHONE (OUTPATIENT)
Dept: PHYSICAL THERAPY | Facility: HOSPITAL | Age: 84
End: 2024-06-04

## 2024-06-04 NOTE — TELEPHONE ENCOUNTER
Called pt and discussed status - pt reports that he is doing very well.  Pt and PT agreed that he is ready for discharge to HEP

## 2024-08-09 ENCOUNTER — HOSPITAL ENCOUNTER (OUTPATIENT)
Age: 84
Discharge: HOME | End: 2024-08-09
Payer: MEDICARE

## 2024-08-09 VITALS
TEMPERATURE: 97 F | HEART RATE: 52 BPM | SYSTOLIC BLOOD PRESSURE: 121 MMHG | RESPIRATION RATE: 18 BRPM | OXYGEN SATURATION: 99 % | DIASTOLIC BLOOD PRESSURE: 47 MMHG

## 2024-08-09 DIAGNOSIS — L23.7 CONTACT DERMATITIS DUE TO POISON OAK: Primary | ICD-10-CM

## 2024-08-09 PROCEDURE — 99213 OFFICE O/P EST LOW 20 MIN: CPT | Performed by: PHYSICIAN ASSISTANT

## 2024-08-09 RX ORDER — TRIAMCINOLONE ACETONIDE 1 MG/G
CREAM TOPICAL 2 TIMES DAILY
Qty: 45 G | Refills: 0 | Status: SHIPPED | OUTPATIENT
Start: 2024-08-09

## 2024-08-09 NOTE — ED INITIAL ASSESSMENT (HPI)
Pt c/o rash to left side of his face + left forearm + left leg for few days, denies fever, denies pain

## 2024-08-09 NOTE — ED PROVIDER NOTES
Patient Seen in: [facility]      History     Chief Complaint   Patient presents with    Rash Skin Problem     Stated Complaint: Rash    Subjective:   HPI    Patient is a 84-year-old male that presents to immediate care due to rash of left forearm and left leg x 1 week.  Patient states that he was weed whacking 1 week prior and is on sure if he was exposed to poison ivy.  States over the past few days he has had increased vesicular pruritic rash of his left arm and left leg.  Has been applying topical hydrocortisone cream with minimal relief.  Denies recent medication changes, fever, pain, discharge.    Objective:   No pertinent past medical history.            No pertinent past surgical history.              No pertinent social history.            Review of Systems    Positive for stated Chief Complaint: Rash Skin Problem    Other systems are as noted in HPI.  Constitutional and vital signs reviewed.      All other systems reviewed and negative except as noted above.    Physical Exam     ED Triage Vitals [08/09/24 1244]   /47   Pulse 52   Resp 18   Temp 97.3 °F (36.3 °C)   Temp src Temporal   SpO2 99 %   O2 Device None (Room air)       Current Vitals:   Vital Signs  BP: 121/47  Pulse: 52  Resp: 18  Temp: 97.3 °F (36.3 °C)  Temp src: Temporal    Oxygen Therapy  SpO2: 99 %  O2 Device: None (Room air)            Physical Exam    Vital signs reviewed. Nursing note reviewed.  Constitutional: Well-developed. Well-nourished. In no acute distress  HENT: Mucous membranes moist.  No oral lesions.  EYES: No scleral icterus or conjunctival injection.  NECK: Full ROM. Supple.   CARDIAC: Normal rate.   PULM/CHEST:  No wheezes  Extremities: Full ROM  NEURO: Awake, alert, following commands, moving extremities, answering questions.   SKIN: Warm and dry.  Vesicular erythematous pruritic rash of left forearm with satellite vesicular lesions on left leg.  No tenderness fluctuation.  PSYCH: Normal judgment. Normal  affect.             MDM      Patient is a 84-year-old male with past medical history of prediabetes, hypercholesteremia that presents to immediate care due to rash on left arm and leg over the past few days.  Patient arrives with stable vitals.  Physical exam showing erythematous pruritic vesicular rash of left forearm and left thigh.  Most likely contact dermatitis secondary to poison ivy oak or sumac.  Will prescribe topical triamcinolone cream.  Less likely eczema, herpes zoster, cellulitis or abscess.  History given by patient.                                   Medical Decision Making      Disposition and Plan     Clinical Impression:  1. Contact dermatitis due to poison oak         Disposition:  Discharge  8/9/2024  1:03 pm    Follow-up:  Kolby Arvizu MD  [address] [place] [address]  [address] [place] [id number] [phone]    Call             Medications Prescribed:  Discharge Medication List as of 8/9/2024  1:04 PM        START taking these medications    Details   triamcinolone 0.1 % External Cream Apply topically 2 (two) times daily., Normal, Disp-45 g, R-0

## 2024-08-12 ENCOUNTER — HOSPITAL ENCOUNTER (OUTPATIENT)
Age: 84
Discharge: HOME | End: 2024-08-12
Payer: MEDICARE

## 2024-08-12 VITALS
TEMPERATURE: 97 F | DIASTOLIC BLOOD PRESSURE: 54 MMHG | OXYGEN SATURATION: 100 % | RESPIRATION RATE: 16 BRPM | SYSTOLIC BLOOD PRESSURE: 156 MMHG | HEART RATE: 56 BPM

## 2024-08-12 DIAGNOSIS — L23.7 POISON IVY: Primary | ICD-10-CM

## 2024-08-12 PROCEDURE — 99213 OFFICE O/P EST LOW 20 MIN: CPT

## 2024-08-12 RX ORDER — METHYLPREDNISOLONE 4 MG/1
TABLET ORAL
Qty: 1 EACH | Refills: 0 | Status: SHIPPED | OUTPATIENT
Start: 2024-08-12

## 2024-08-12 NOTE — ED PROVIDER NOTES
He    Patient Seen in: [facility]      History     Chief Complaint   Patient presents with    Rash Skin Problem     Stated Complaint: poison ivy  Subjective:   84-year-old male presents for poison ivy to the left posterior forearm.  He states he was exposed 6 days ago.  He states he did go to a walk-in clinic a few days ago and was prescribed a triamcinolone cream.  He states it seems to be spreading on his arm and he also has a small patch on his anterior thigh.  No fevers.  No drainage.  No bleeding.  He appears nontoxic.      Objective:   Past Medical History:    Abnormal Heart Score CT    calcium heart score 277    Abnormal MRI, knee    MRI R knee 5/2017 per Dr. Heath-medial meniscal tear and MCL sprain. physical therapy.    Acute torn meniscus of knee    RIGHT KNEE    Allergic rhinitis    Atherosclerosis of coronary artery    Back problem    Mccrary esophagus    No symptoms in last 10 years    Cancer (HCC)    prostate    Colon polyps    repeat CLN in 2025    Coronary atherosclerosis    Diverticular disease    Diverticulosis    seen on colonoscopy    GERD (gastroesophageal reflux disease)    EGD 10/30/17 Dr Kapoor at [facility]--poss short segment barretts--bx however neg.     H/O colonoscopy    cscopy Dr Kapoor -no recurrent polyps, sigmoid diverticulosis and int hemorrhoids.     Hearing loss    hearing aides    Heart disease    Moderate blockage in one artery    Hemorrhoids    Herpes labialis    Hiatal hernia    EGD 1999, 2002, 2007  Dr Kapoor     High cholesterol    Hyperlipidemia    Kidney stones    removed via cystoscopy at [facility] hosp in [place] 1980    Lumbar degenerative disc disease    Dr Swain did LESIs 2013, 2014, 2015; has seen Dr. Hwang and has seen Dr. Marie neurosurg at [facility].     Lumbar herniated disc    was hosp in [place] and had back brace in 1976 after hurt back taboganning    Mild CAD    Cardiac cath 8/4/20 Dr Salguero--50% LAD stenosis. medical mgmt.     Ocular hypertension of right eye    taking Latanoprost OU qhs     Pinguecula of both eyes    Prediabetes    A1c of 5.8 in 2015    Primary open angle glaucoma (POAG) of right eye    20 new diagnosis of COAG OD- based on progression of OCT OD- patient already was on Latanoprost qhsOU - previously treated for OHT OD    Primary open angle glaucoma of left eye    3/8/16 started on Latanoprost qhs OU by PPS- due to OHT OU (36/41)and thinning of OCT OS- treating OHT OD and COAG OS    Prostate cancer (HCC)    prostatectomy at [facility]---Dr Lenz    Skin lesion of face    sees derm Dr. Antonio in [place]    Superficial punctate keratitis    left    Thoracic spondylosis            Past Surgical History:   Procedure Laterality Date    Cataract Left 2019    [facility]    Cataract Right 2019    Colonoscopy  2007    Dr Kapoor at [place]    Other      egd - ,,-Dr Kapoor    Other surgical history      cystoscopy to remove kidney stones - [facility] hosp     Remv prostate,retropub,radical      prostatectomy for prostate cancer Dr. Lenz at [facility]    Sigmoidoscopy,diagnostic  Previous annual physicals    Tonsillectomy  89    age 7              Social History     Socioeconomic History    Marital status:    Occupational History    Occupation: retired banker   Tobacco Use    Smoking status: Former     Current packs/day: 0.00     Types: Pipe, Cigarettes     Quit date: 1953     Years since quittin.6    Smokeless tobacco: Never    Tobacco comments:     Pipe smoker only   Vaping Use    Vaping status: Never Used   Substance and Sexual Activity    Alcohol use: Yes     Alcohol/week: 14.0 standard drinks of alcohol     Types: 14 Glasses of wine per week     Comment: 2 glasses wine per day.    Drug use: No   Other Topics Concern    Caffeine Concern Yes     Comment: 2 cups     Exercise Yes     Comment: walking, golfing   Social History Narrative    The patient does not  use an assistive device..      The patient does live in a home with stairs.     Social Determinants of Health      Received from [facility]            Review of Systems    Positive for stated complaint: Rash Skin Problem     Other systems are as noted in HPI.  Constitutional and vital signs reviewed.      All other systems reviewed and negative except as noted above.    Physical Exam     ED Triage Vitals [08/12/24 0925]   /54   Pulse 56   Resp 16   Temp 97 °F (36.1 °C)   Temp src Temporal   SpO2 100 %   O2 Device None (Room air)     Current:/54   Pulse 56   Temp 97 °F (36.1 °C) (Temporal)   Resp 16   SpO2 100%     Physical Exam  Vitals and nursing note reviewed.   Constitutional:       General: He is not in acute distress.     Appearance: Normal appearance. He is not toxic-appearing.   Cardiovascular:      Rate and Rhythm: Normal rate and regular rhythm.   Pulmonary:      Effort: Pulmonary effort is normal.      Breath sounds: Normal breath sounds.   Musculoskeletal:         General: Normal range of motion.   Skin:     General: Skin is warm and dry.      Capillary Refill: Capillary refill takes less than 2 seconds.      Findings: Rash present.      Comments: Red raised lesions consistent with poison ivy to the left posterior forearm.  Itching is present.  No drainage or bleeding.  No signs of infection.   Neurological:      General: No focal deficit present.      Mental Status: He is alert and oriented to person, place, and time.   Psychiatric:         Mood and Affect: Mood normal.         Behavior: Behavior normal.         ED Course   No results found.  Labs Reviewed - No data to display    MDM     Medical Decision Making  I will prescribe the patient oral steroids since the topical steroids are not helping much.  We discussed taking Zyrtec during the day and Benadryl at night.  We discussed avoiding hot showers or baths and close follow-up with his primary care provider or  dermatology.    Risk  OTC drugs.  Prescription drug management.  Risk Details: Poison ivy versus cellulitis        Disposition and Plan     Clinical Impression:  1. Poison ivy         Disposition:  Discharge  8/12/2024  9:42 am    Follow-up:  Kloby Arvizu MD  [address]  [address]  [facility] [address] [phone]    Schedule an appointment as soon as possible for a visit   As needed    [facility] [place]  [address]  [facility] [id number]  Schedule an appointment as soon as possible for a visit   As needed          Medications Prescribed:  Discharge Medication List as of 8/12/2024  9:44 AM        START taking these medications    Details   methylPREDNISolone (MEDROL) 4 MG Oral Tablet Therapy Pack Dosepack: take as directed, Normal, Disp-1 each, R-0

## 2024-08-12 NOTE — ED INITIAL ASSESSMENT (HPI)
Was seen at [facility] for poison ivy left forearm on 8/9 , given ointment, states rash is not improving, no fever

## 2024-08-12 NOTE — DISCHARGE INSTRUCTIONS
Avoid hot showers or baths. Continue Zyrtec or Benadryl. Take the Prednidone as prescribed. Follow up as needed with your doctor or Dermatology. Return for any concerns.

## 2024-10-15 ENCOUNTER — TELEPHONE (OUTPATIENT)
Dept: PHYSICAL MEDICINE AND REHAB | Facility: CLINIC | Age: 84
End: 2024-10-15

## 2024-10-15 NOTE — TELEPHONE ENCOUNTER
Patient moved the wrong way and pain to back of pelvis. Patient reports this pain is different than his pains in the past.      Patient states the left \"pelvic bone\"  -on top    Triggered by sitting, grabbing and twisting at same time. Sitting on chair and bent and twisted to  something - happened last night at 10:00 pm.     At 5 am was still present but not severe.    Patient is leaving for [facility] in a week from today, so would like to try to get it resolved before then.      Current pain:3/10 while sitting  When it occurred the pain was a 9.    Patient took advil pm before bed-    Patient states if he weren't going away he would wait and see, but this trip is something his wife has been asking for the last 7-8 years and the boat passage will be very rough, so needs to be able to handle the rocking-wants to do what needs to do to be able to go and enjoy.    This RN provided patient with an appt w/ Dr. Swain on 10/16/24 at 10:30 am- advised patient that he may need further diagnostics depending on what Dr. Swain's exam findings are.  Patient verbalized understanding.

## 2024-10-15 NOTE — TELEPHONE ENCOUNTER
Pt called to discuss intense sharp shooting pain on left side of back. Pt leaves for [place] next week and would like to discuss with a nurse ASAP. Please advise, thank you.

## 2024-10-16 ENCOUNTER — OFFICE VISIT (OUTPATIENT)
Dept: PHYSICAL MEDICINE AND REHAB | Facility: CLINIC | Age: 84
End: 2024-10-16
Payer: MEDICARE

## 2024-10-16 VITALS — BODY MASS INDEX: 22.73 KG/M2 | WEIGHT: 150 LBS | HEIGHT: 68 IN

## 2024-10-16 DIAGNOSIS — M54.16 LUMBAR RADICULOPATHY: ICD-10-CM

## 2024-10-16 DIAGNOSIS — M48.061 SPINAL STENOSIS OF LUMBAR REGION WITHOUT NEUROGENIC CLAUDICATION: Primary | ICD-10-CM

## 2024-10-16 DIAGNOSIS — M48.061 LUMBAR FORAMINAL STENOSIS: ICD-10-CM

## 2024-10-16 DIAGNOSIS — M25.552 LEFT HIP PAIN: ICD-10-CM

## 2024-10-16 DIAGNOSIS — M51.9 LUMBAR DISC DISEASE: ICD-10-CM

## 2024-10-16 DIAGNOSIS — M51.26 LUMBAR HERNIATED DISC: ICD-10-CM

## 2024-10-16 DIAGNOSIS — K21.9 GASTROESOPHAGEAL REFLUX DISEASE WITHOUT ESOPHAGITIS: ICD-10-CM

## 2024-10-16 DIAGNOSIS — M43.16 SPONDYLOLISTHESIS OF LUMBAR REGION: ICD-10-CM

## 2024-10-16 PROCEDURE — 99214 OFFICE O/P EST MOD 30 MIN: CPT | Performed by: PHYSICAL MEDICINE & REHABILITATION

## 2024-10-16 RX ORDER — HYDROCODONE BITARTRATE AND ACETAMINOPHEN 5; 325 MG/1; MG/1
1 TABLET ORAL EVERY 6 HOURS PRN
Qty: 10 TABLET | Refills: 0 | Status: SHIPPED | OUTPATIENT
Start: 2024-10-16 | End: 2024-10-26

## 2024-10-16 NOTE — PROGRESS NOTES
Low Back Pain H & P    Chief Complaint:   Chief Complaint   Patient presents with    Follow - Up     LOV 1/31/24 pt is here with complaints of pain to posterior pelvis after bending and twisting, states a sharp pain came through his left hip/lower back area, reports it to be sharp.  Currently not taking any pain meds or muscle relaxer's. No recent imaging or physical therapy. Pain 0/10     Nursing note reviewed and verified.    Patient was last seen on 1/31/2024.  He has been doing well.  Then 2 days ago, he bent over to the right and developed left buttock and lateral hip pain.  This was a sharp and shooting pain which resolved once he stood up.  This happened again and he has not bent over since.  He has done some light stretching.  Laying on the left side give him some slight discomfort.  He denies numbness, tingling, and weakness.  He is not doing his HEP as much now.    Past Medical History   Past Medical History:    Abnormal Heart Score CT    calcium heart score 277    Abnormal MRI, knee    MRI R knee 5/2017 per Dr. Heath-medial meniscal tear and MCL sprain. physical therapy.    Acute torn meniscus of knee    RIGHT KNEE    Allergic rhinitis    Atherosclerosis of coronary artery    Back problem    Mccrary esophagus    No symptoms in last 10 years    Cancer (HCC)    prostate    Colon polyps    repeat CLN in 2025    Coronary atherosclerosis    Diverticular disease    Diverticulosis    seen on colonoscopy    GERD (gastroesophageal reflux disease)    EGD 10/30/17 Dr Kapoor at [facility]--poss short segment barretts--bx however neg.     H/O colonoscopy    cscopy Dr Kapoor -no recurrent polyps, sigmoid diverticulosis and int hemorrhoids.     Hearing loss    hearing aides    Heart disease    Moderate blockage in one artery    Hemorrhoids    Herpes labialis    Hiatal hernia    EGD 1999, 2002, 2007  Dr Kapoor     High cholesterol    Hyperlipidemia    Kidney stones    [facility]     Lumbar degenerative disc disease    Dr Swain did LESIs , , ; has seen Dr. Hwang and has seen Dr. Marie neurosurg at [facility].     Lumbar herniated disc    was hosp in [place] and had back brace in  after hurt back taboganning    Mild CAD    Cardiac cath 20 Dr Salguero--50% LAD stenosis. medical mgmt.    Ocular hypertension of right eye    taking Latanoprost OU qhs     Pinguecula of both eyes    Prediabetes    A1c of 5.8 in 2015    Primary open angle glaucoma (POAG) of right eye    20 new diagnosis of COAG OD- based on progression of OCT OD- patient already was on Latanoprost qhsOU - previously treated for OHT OD    Primary open angle glaucoma of left eye    3/8/16 started on Latanoprost qhs OU by PPS- due to OHT OU (36/41)and thinning of OCT OS- treating OHT OD and COAG OS    Prostate cancer (HCC)    prostatectomy at [place]---Dr Lenz    Skin lesion of face    sees derm Dr. Antonio in [place]    Superficial punctate keratitis    left    Thoracic spondylosis       Past Surgical History   Past Surgical History:   Procedure Laterality Date    Cataract Left 2019    [facility]    Cataract Right 2019    Colonoscopy  2007    Dr Kapoor at [place]    Other      egd - ,,-Dr Kapoor    Other surgical history      cystoscopy to remove kidney stones -[facility]     Remv prostate,retropub,radical      prostatectomy for prostate cancer Dr. Lenz at [place]    Sigmoidoscopy,diagnostic  Previous annual physicals    Tonsillectomy  9    age 7       Family History   Family History   Problem Relation Age of Onset    Cancer Father          age 72 lung cancer    Stroke Father          age 72    Breast Cancer Mother 48    Dementia Mother          age 84 pneumonia    Other (Other[other]) Mother     Lipids Brother     Diabetes Brother     Other (overweight[other]) Brother     Other (back surgery[other]) Brother      Other (1 brother[other]) Other     Breast Cancer Daughter 45        DCIS-had double mastectomy    Diabetes Brother     Lipids Brother     Lipids Brother     Glaucoma Neg     Macular degeneration Neg        Social History   Social History     Socioeconomic History    Marital status:      Spouse name: Not on file    Number of children: Not on file    Years of education: Not on file    Highest education level: Not on file   Occupational History    Occupation: retired banker   Tobacco Use    Smoking status: Former     Current packs/day: 0.00     Types: Pipe, Cigarettes     Quit date: 1953     Years since quittin.8    Smokeless tobacco: Never    Tobacco comments:     Pipe smoker only   Vaping Use    Vaping status: Never Used   Substance and Sexual Activity    Alcohol use: Yes     Alcohol/week: 14.0 standard drinks of alcohol     Types: 14 Glasses of wine per week     Comment: 2 glasses wine per day.    Drug use: No    Sexual activity: Not on file   Other Topics Concern     Service Not Asked    Blood Transfusions Not Asked    Caffeine Concern Yes     Comment: 2 cups     Occupational Exposure Not Asked    Hobby Hazards Not Asked    Sleep Concern Not Asked    Stress Concern Not Asked    Weight Concern Not Asked    Special Diet Not Asked    Back Care Not Asked    Exercise Yes     Comment: walking, golfing    Bike Helmet Not Asked    Seat Belt Not Asked    Self-Exams Not Asked   Social History Narrative    The patient does not use an assistive device..      The patient does live in a home with stairs.     Social Drivers of Health     Financial Resource Strain: Not on file   Food Insecurity: Not on file   Transportation Needs: Not on file   Physical Activity: Not on file   Stress: Not on file   Social Connections: Not on file   Housing Stability: At Risk (2023)    Received from [facility]     Living Situation: Not on file     Housing Problems: Not on file       PE:  The patient  does appear in his stated age in no distress.  The patient is well groomed.    Psychiatric:  The patient is alert and oriented x 3.  The patient has a normal affect and mood.      Respiratory:  No acute respiratory distress. Patient does not have a cough.    HEENT:  Extraocular muscles are intact. There is no kern icterus. Pupils are equal, round, and reactive to light. No redness or discharge bilaterally.    Skin:  There are no rashes or lesions.    Vitals:  There were no vitals filed for this visit.    Gait:    Gait: Normal gait   Sit to Stand: no difficulty      Lumbar Spine:    Scoliosis: No scoliosis present   Lumbar Flexion: 90 degrees Gives the patient pain in the left buttock. This is mild and flexion with right rotation does not give him any pain.   Lumbar Extension: 10 degrees Painless     Lumbar Spine Palpation:    Spinous Processes: Non-tender for all Spinous Processes   Z-joints: Non-tender for all Z-joints   SIJ: Non-tender for bilateral SIJ   Piriformis Muscle: Non-tender bilateral Piriformis muscles   Greater Trochanteric Bursa: Non-tender for bilateral Greater Trochanteric Bursa     Vascular lower extremity:   Dorsalis pedis pulse-RIGHT 2+   Dorsalis pedis pulse-LEFT 2+   Tibialis posterior pulse-RIGHT 2+   Tibialis posterior pulse-LEFT 2+     Neurological Lower Extremity:    Light Touch Sensation: Intact in bilateral Lower Extremities   LE Muscle Strength: All LE strength measurements 5/5 except:  Hamstring RIGHT:   4/5  Extensor Hallucis Longus RIGHT:   4+/5   RIGHT plantar reflexes: upward (Babinski) response   LEFT plantar reflexes: downward response   Reflexes: 2+ in bilateral lower extremities except:  Right Achilles tendon which are absent  Left Achilles tendon which are absent     Hip: Hips are stable.    RIGHT hip ROM mild-moderately limited   LEFT hip ROM severely limited   Right hip flexion Negative pain   LEFT hip flexion Negative pain   RIGHT hip ELISSA test Negative for pain   LEFT hip  ELISSA test Negative for pain   RIGHT hip internal rotation Negative for pain   LEFT hip internal rotation Negative for pain   RIGHT hip piriformis stretch test Negative for pain   LEFT hip piriformis stretch test Negative for pain     Assessment  1. L3-4 mod-severe central stenosis    2. L5-S1 bilateral mod foraminal, L4-5 mod diffuse, L3-4 mod diffuse, L2-3 mod diffuse bulging discs     3. L5-S1 left mod/right severe, L4-5 left mild-mod/right mod, L3-4 bilateral mod, L2-3 left mod/right severe foraminal stenosis      4. L5-S1 mild right paracentral HNP, L4-5 right mild HNP, L3-4 right mild HNP     5. right > left L5-S1 radiculopathy    6. Gastroesophageal reflux disease without esophagitis    7. L4-5 stable grade 1 spondylolisthesis (S1 is high riding & L1 with small ribs)     8. Left hip pain         Plan  He will do his home exercise program more frequently.    He will get a left hip x-ray.    I have given him 10 of the Norco 5/325 to use on his trip if he develops the pain again.    He will follow up in 4-6 months or sooner if needed.    The patient understands and agrees with the stated plan.  Ryan Swain MD  10/16/2024

## 2024-10-16 NOTE — PATIENT INSTRUCTIONS
Plan  He will do his home exercise program more frequently.    He will get a left hip x-ray.    I have given him 10 of the Norco 5/325 to use on his trip if he develops the pain again.    He will follow up in 4-6 months or sooner if needed.     English

## 2024-11-18 ENCOUNTER — HOSPITAL ENCOUNTER (OUTPATIENT)
Dept: GENERAL RADIOLOGY | Facility: HOSPITAL | Age: 84
Discharge: HOME | End: 2024-11-18
Attending: PHYSICAL MEDICINE & REHABILITATION
Payer: MEDICARE

## 2024-11-18 ENCOUNTER — LAB ENCOUNTER (OUTPATIENT)
Dept: LAB | Facility: HOSPITAL | Age: 84
End: 2024-11-18
Attending: PHYSICAL MEDICINE & REHABILITATION
Payer: MEDICARE

## 2024-11-18 DIAGNOSIS — K21.9 GASTROESOPHAGEAL REFLUX DISEASE: ICD-10-CM

## 2024-11-18 DIAGNOSIS — R73.03 PREDIABETES: ICD-10-CM

## 2024-11-18 DIAGNOSIS — M25.552 LEFT HIP PAIN: ICD-10-CM

## 2024-11-18 DIAGNOSIS — C61 PROSTATE CANCER (HCC): Primary | ICD-10-CM

## 2024-11-18 DIAGNOSIS — E78.00 HYPERCHOLESTEREMIA: ICD-10-CM

## 2024-11-18 LAB
BASOPHILS # BLD AUTO: 0.04 X10(3) UL (ref 0–0.2)
BASOPHILS NFR BLD AUTO: 0.6 %
CHOLEST SERPL-MCNC: 159 MG/DL (ref ?–200)
DEPRECATED RDW RBC AUTO: 43.8 FL (ref 35.1–46.3)
EOSINOPHIL # BLD AUTO: 0.1 X10(3) UL (ref 0–0.7)
EOSINOPHIL NFR BLD AUTO: 1.4 %
ERYTHROCYTE [DISTWIDTH] IN BLOOD BY AUTOMATED COUNT: 13.1 % (ref 11–15)
EST. AVERAGE GLUCOSE BLD GHB EST-MCNC: 123 MG/DL (ref 68–126)
FASTING PATIENT LIPID ANSWER: YES
HBA1C MFR BLD: 5.9 % (ref ?–5.7)
HCT VFR BLD AUTO: 43.2 %
HDLC SERPL-MCNC: 56 MG/DL (ref 40–59)
HGB BLD-MCNC: 14.2 G/DL
IMM GRANULOCYTES # BLD AUTO: 0.02 X10(3) UL (ref 0–1)
IMM GRANULOCYTES NFR BLD: 0.3 %
LDLC SERPL CALC-MCNC: 89 MG/DL (ref ?–100)
LYMPHOCYTES # BLD AUTO: 3.17 X10(3) UL (ref 1–4)
LYMPHOCYTES NFR BLD AUTO: 45.2 %
MCH RBC QN AUTO: 30 PG (ref 26–34)
MCHC RBC AUTO-ENTMCNC: 32.9 G/DL (ref 31–37)
MCV RBC AUTO: 91.3 FL
MONOCYTES # BLD AUTO: 0.57 X10(3) UL (ref 0.1–1)
MONOCYTES NFR BLD AUTO: 8.1 %
NEUTROPHILS # BLD AUTO: 3.11 X10 (3) UL (ref 1.5–7.7)
NEUTROPHILS # BLD AUTO: 3.11 X10(3) UL (ref 1.5–7.7)
NEUTROPHILS NFR BLD AUTO: 44.4 %
NONHDLC SERPL-MCNC: 103 MG/DL (ref ?–130)
PLATELET # BLD AUTO: 260 10(3)UL (ref 150–450)
PSA SERPL-MCNC: <0.04 NG/ML (ref ?–4)
RBC # BLD AUTO: 4.73 X10(6)UL
TRIGL SERPL-MCNC: 75 MG/DL (ref 30–149)
VLDLC SERPL CALC-MCNC: 12 MG/DL (ref 0–30)
WBC # BLD AUTO: 7 X10(3) UL (ref 4–11)

## 2024-11-18 PROCEDURE — 73502 X-RAY EXAM HIP UNI 2-3 VIEWS: CPT | Performed by: PHYSICAL MEDICINE & REHABILITATION

## 2024-11-18 PROCEDURE — 84153 ASSAY OF PSA TOTAL: CPT

## 2024-11-18 PROCEDURE — 80061 LIPID PANEL: CPT

## 2024-11-18 PROCEDURE — 36415 COLL VENOUS BLD VENIPUNCTURE: CPT

## 2024-11-18 PROCEDURE — 83036 HEMOGLOBIN GLYCOSYLATED A1C: CPT

## 2024-11-18 PROCEDURE — 85025 COMPLETE CBC W/AUTO DIFF WBC: CPT

## 2024-12-11 ENCOUNTER — HOSPITAL ENCOUNTER (OUTPATIENT)
Age: 84
Discharge: HOME | End: 2024-12-11
Payer: MEDICARE

## 2024-12-11 VITALS
DIASTOLIC BLOOD PRESSURE: 53 MMHG | OXYGEN SATURATION: 98 % | HEART RATE: 56 BPM | SYSTOLIC BLOOD PRESSURE: 156 MMHG | RESPIRATION RATE: 20 BRPM | TEMPERATURE: 99 F

## 2024-12-11 DIAGNOSIS — T14.8XXA SUPERFICIAL LACERATION: Primary | ICD-10-CM

## 2024-12-11 PROCEDURE — 99213 OFFICE O/P EST LOW 20 MIN: CPT | Performed by: NURSE PRACTITIONER

## 2024-12-11 PROCEDURE — 12001 RPR S/N/AX/GEN/TRNK 2.5CM/<: CPT | Performed by: NURSE PRACTITIONER

## 2024-12-11 NOTE — ED PROVIDER NOTES
He    Patient Seen in: [facility]      History     Chief Complaint   Patient presents with    Laceration     Stated Complaint: hand injury  Subjective:   84-year-old healthy male presents for a left distal pointer digit injury that occurred prior to arrival.  The patient states that he was working on building a table and nicked himself with a blade.  He has a very superficial less than 1 cm laceration present to the distal pad of the left pointer digit.  He is right-hand dominant.  No numbness or tingling.  No active bleeding.  He is able to flex and extend the digit against resistance without difficulty or pain.  He is up-to-date with his tetanus.  He appears nontoxic.      Objective:   Past Medical History:    Abnormal Heart Score CT    calcium heart score 277    Abnormal MRI, knee    MRI R knee 5/2017 per Dr. Heath-medial meniscal tear and MCL sprain. physical therapy.    Acute torn meniscus of knee    RIGHT KNEE    Allergic rhinitis    Atherosclerosis of coronary artery    Back problem    Mccrary esophagus    No symptoms in last 10 years    Cancer (HCC)    prostate    Colon polyps    repeat CLN in 2025    Coronary atherosclerosis    Diverticular disease    Diverticulosis    seen on colonoscopy    GERD (gastroesophageal reflux disease)    EGD 10/30/17 Dr Kapoor at [facility]--poss short segment barretts--bx however neg.     H/O colonoscopy    cscopy Dr Kapoor -no recurrent polyps, sigmoid diverticulosis and int hemorrhoids.     Hearing loss    hearing aides    Heart disease    Moderate blockage in one artery    Hemorrhoids    Herpes labialis    Hiatal hernia    EGD 1999, 2002, 2007  Dr Kapoor     High cholesterol    Hyperlipidemia    Kidney stones    removed via cystoscopy at [facility] in [place] 1980    Lumbar degenerative disc disease    Dr Swain did LESIs 2013, 2014, 2015; has seen Dr. Hwang and has seen Dr. Marie neurosurg at [facility].     Lumbar herniated disc    was  hosp in [place] and had back brace in  after hurt back taboganning    Mild CAD    Cardiac cath 20 Dr Salguero--50% LAD stenosis. medical mgmt.    Ocular hypertension of right eye    taking Latanoprost OU qhs     Pinguecula of both eyes    Prediabetes    A1c of 5.8 in 2015    Primary open angle glaucoma (POAG) of right eye    18 new diagnosis of COAG OD- based on progression of OCT OD- patient already was on Latanoprost qhsOU - previously treated for OHT OD    Primary open angle glaucoma of left eye    3/8/16 started on Latanoprost qhs OU by PPS- due to OHT OU (36/41)and thinning of OCT OS- treating OHT OD and COAG OS    Prostate cancer (HCC)    prostatectomy at [facility]---Dr Lenz    Skin lesion of face    sees derm Dr. Antonio in [place]    Superficial punctate keratitis    left    Thoracic spondylosis            Past Surgical History:   Procedure Laterality Date    Cataract Left 2019    [facility]    Cataract Right 2019    Colonoscopy  2007    Dr Kapoor at [place]    Other      egd - ,,-Dr Kapoor    Other surgical history      cystoscopy to remove kidney stones -[facility]     Remv prostate,retropub,radical      prostatectomy for prostate cancer Dr. Lenz at [facility]    Sigmoidoscopy,diagnostic  Previous annual physicals    Tonsillectomy  9    age 7              Social History     Socioeconomic History    Marital status:    Occupational History    Occupation: retired banker   Tobacco Use    Smoking status: Former     Current packs/day: 0.00     Types: Pipe, Cigarettes     Quit date: 1953     Years since quittin.9    Smokeless tobacco: Never    Tobacco comments:     Pipe smoker only   Vaping Use    Vaping status: Never Used   Substance and Sexual Activity    Alcohol use: Yes     Alcohol/week: 14.0 standard drinks of alcohol     Types: 14 Glasses of wine per week     Comment: 2 glasses wine per day.    Drug use: No   Other Topics  Concern    Caffeine Concern Yes     Comment: 2 cups     Exercise Yes     Comment: walking, golfing   Social History Narrative    The patient does not use an assistive device..      The patient does live in a home with stairs.     Social Drivers of Health      [facility]            Review of Systems    Positive for stated complaint: Laceration     Other systems are as noted in HPI.  Constitutional and vital signs reviewed.      All other systems reviewed and negative except as noted above.    Physical Exam     ED Triage Vitals [12/11/24 1402]   /53   Pulse 56   Resp 20   Temp 98.5 °F (36.9 °C)   Temp src Oral   SpO2 98 %   O2 Device None (Room air)     Current:/53   Pulse 56   Temp 98.5 °F (36.9 °C) (Oral)   Resp 20   SpO2 98%     Physical Exam  Vitals and nursing note reviewed.   Constitutional:       General: He is not in acute distress.     Appearance: Normal appearance. He is not toxic-appearing.   Pulmonary:      Effort: Pulmonary effort is normal.   Musculoskeletal:         General: Normal range of motion.   Skin:     General: Skin is warm and dry.      Capillary Refill: Capillary refill takes less than 2 seconds.      Findings: Wound present.      Comments: Less than 1 cm superficial laceration present to the distal left pointer digit.  No active bleeding or signs of infection.  Able to flex and extend the digit against resistance without difficulty or pain.  Brisk cap refill.   Neurological:      General: No focal deficit present.      Mental Status: He is alert and oriented to person, place, and time.   Psychiatric:         Mood and Affect: Mood normal.         Behavior: Behavior normal.         ED Course   No results found.  Labs Reviewed - No data to display    MDM     Medical Decision Making  The wound was cleaned and then repaired with a small amount of Dermabond.  We discussed Dermabond care.  We discussed signs and symptoms of infection.  He will follow-up  as needed with his primary care doctor.    Risk  OTC drugs.  Risk Details: Laceration versus avulsion        Disposition and Plan     Clinical Impression:  1. Superficial laceration         Disposition:  Discharge  12/11/2024  2:24 pm    Follow-up:  Kolby Arvizu MD  [address] [address]  [phone]    Schedule an appointment as soon as possible for a visit   As needed    Hira Singleton MD  [address] [facility] [id number] [phone]    Schedule an appointment as soon as possible for a visit   As needed          Medications Prescribed:  Current Discharge Medication List

## 2024-12-11 NOTE — DISCHARGE INSTRUCTIONS
Try to avoid getting the glue wet for the next day or so.  After that you can wash the hand normally, but dry thoroughly after washing.  Monitor for signs of infection.  Follow-up as needed with your doctor or the hand specialist.  Return for any concerns.

## 2025-02-03 ENCOUNTER — OFFICE VISIT (OUTPATIENT)
Dept: PHYSICAL MEDICINE AND REHAB | Facility: CLINIC | Age: 85
End: 2025-02-03
Payer: MEDICARE

## 2025-02-03 VITALS — HEIGHT: 68 IN | BODY MASS INDEX: 22.73 KG/M2 | WEIGHT: 150 LBS

## 2025-02-03 DIAGNOSIS — G56.01 RIGHT CARPAL TUNNEL SYNDROME: ICD-10-CM

## 2025-02-03 DIAGNOSIS — M48.02 CERVICAL STENOSIS OF SPINE: ICD-10-CM

## 2025-02-03 DIAGNOSIS — M54.12 CERVICAL RADICULOPATHY: ICD-10-CM

## 2025-02-03 DIAGNOSIS — G56.22 ULNAR NEUROPATHY AT ELBOW OF LEFT UPPER EXTREMITY: ICD-10-CM

## 2025-02-03 DIAGNOSIS — G56.21 CUBITAL TUNNEL SYNDROME ON RIGHT: ICD-10-CM

## 2025-02-03 DIAGNOSIS — M50.90 CERVICAL DISC DISEASE: Primary | ICD-10-CM

## 2025-02-03 DIAGNOSIS — M16.0 PRIMARY OSTEOARTHRITIS OF BOTH HIPS: ICD-10-CM

## 2025-02-03 RX ORDER — NIRMATRELVIR AND RITONAVIR 300-100 MG
KIT ORAL
COMMUNITY
Start: 2024-10-02

## 2025-02-03 RX ORDER — SCOPOLAMINE 1 MG/3D
1 PATCH, EXTENDED RELEASE TRANSDERMAL
COMMUNITY
Start: 2024-10-04

## 2025-02-03 NOTE — PROGRESS NOTES
[facility]    Chief Complaint:    Chief Complaint   Patient presents with    Follow - Up     LOV 10/16/24. F/U for L sided hip/low back pain. Pt has had mini headaches for 4-6 weeks (mild, short in duration) about 4x weekly. Denies N/T, weakness. Concerned neck is causing headaches but no neck pain. Occasional advil pm. No recent PT.     Nursing note reviewed and verified.    Patient was last seen on 10/16/2024.  He had the x-rays.      Then 4-6 weeks ago, he noticed that he was having headaches.  He would get these when he was bending over and with lifting and carrying and twisting.  The headache would be temporal or occipital in location.  If he stopped that activity, the headache would resolve.  They would last 2-3 minutes and they were a dull aching.  He denies numbness, tingling, and weakness.      Past Medical History   Past Medical History:    Abnormal Heart Score CT    calcium heart score 277    Abnormal MRI, knee    MRI R knee 5/2017 per Dr. Heath-medial meniscal tear and MCL sprain. physical therapy.    Acute torn meniscus of knee    RIGHT KNEE    Allergic rhinitis    Atherosclerosis of coronary artery    Back problem    Mccrary esophagus    No symptoms in last 10 years    Cancer (HCC)    prostate    Colon polyps    repeat CLN in 2025    Coronary atherosclerosis    Diverticular disease    Diverticulosis    seen on colonoscopy    GERD (gastroesophageal reflux disease)    EGD 10/30/17 Dr Kapoor at [facility]--poss short segment barretts--bx however neg.     H/O colonoscopy    cscopy Dr Kapoor -no recurrent polyps, sigmoid diverticulosis and int hemorrhoids.     Hearing loss    hearing aides    Heart disease    Moderate blockage in one artery    Hemorrhoids    Herpes labialis    Hiatal hernia    EGD 1999, 2002, 2007  Dr Kapoor     High cholesterol    Hyperlipidemia    Kidney stones    removed via cystoscopy at [facility] in [place] 1980    Lumbar degenerative disc disease      Brynn did LESIs , , ; has seen Dr. Hwang and has seen Dr. Marie neurosurg at [facility].     Lumbar herniated disc    was hosp in traction and had back brace in  after hurt back taboganning    Mild CAD    Cardiac cath 20 Dr Salguero--50% LAD stenosis. medical mgmt.    Ocular hypertension of right eye    taking Latanoprost OU qhs     Pinguecula of both eyes    Prediabetes    A1c of 5.8 in 2015    Primary open angle glaucoma (POAG) of right eye    20 new diagnosis of COAG OD- based on progression of OCT OD- patient already was on Latanoprost qhsOU - previously treated for OHT OD    Primary open angle glaucoma of left eye    3/8/16 started on Latanoprost qhs OU by PPS- due to OHT OU (36/41)and thinning of OCT OS- treating OHT OD and COAG OS    Prostate cancer (HCC)    prostatectomy at [facility]---Dr Lenz    Skin lesion of face    sees derm Dr. Antonio in [place]    Superficial punctate keratitis    left    Thoracic spondylosis       Past Surgical History   Past Surgical History:   Procedure Laterality Date    Cataract Left 2019    [facility]    Cataract Right 2019    Colonoscopy  2007    Dr Kapoor at [place]    Other      egd - ,,-Dr Kapoor    Other surgical history      cystoscopy to remove kidney stones - comm hosp     Remv prostate,retropub,radical      prostatectomy for prostate cancer Dr. Lenz at [facility]    Sigmoidoscopy,diagnostic  Previous annual physicals    Tonsillectomy  1947    age 7       Family History   Family History   Problem Relation Age of Onset    Cancer Father          age 72 lung cancer    Stroke Father          age 72    Breast Cancer Mother 48    Dementia Mother          age 84 pneumonia    Other (Other[other]) Mother     Lipids Brother     Diabetes Brother     Other (overweight[other]) Brother     Other (back surgery[other]) Brother     Other (1 brother[other]) Other     Breast Cancer Daughter 45         DCIS-had double mastectomy    Diabetes Brother     Lipids Brother     Lipids Brother     Glaucoma Neg     Macular degeneration Neg        Social History   Social History     Socioeconomic History    Marital status:      Spouse name: Not on file    Number of children: Not on file    Years of education: Not on file    Highest education level: Not on file   Occupational History    Occupation: retired banker   Tobacco Use    Smoking status: Former     Current packs/day: 0.00     Types: Pipe, Cigarettes     Quit date: 1953     Years since quittin.1    Smokeless tobacco: Never    Tobacco comments:     Pipe smoker only   Vaping Use    Vaping status: Never Used   Substance and Sexual Activity    Alcohol use: Yes     Alcohol/week: 14.0 standard drinks of alcohol     Types: 14 Glasses of wine per week     Comment: 2 glasses wine per day.    Drug use: No    Sexual activity: Not on file   Other Topics Concern     Service Not Asked    Blood Transfusions Not Asked    Caffeine Concern Yes     Comment: 2 cups     Occupational Exposure Not Asked    Hobby Hazards Not Asked    Sleep Concern Not Asked    Stress Concern Not Asked    Weight Concern Not Asked    Special Diet Not Asked    Back Care Not Asked    Exercise Yes     Comment: walking, golfing    Bike Helmet Not Asked    Seat Belt Not Asked    Self-Exams Not Asked   Social History Narrative    The patient does not use an assistive device..      The patient does live in a home with stairs.     Social Drivers of Health     Financial Resource Strain: Not on file   Food Insecurity: Not on file   Transportation Needs: Not on file   Physical Activity: Not on file   Stress: Not on file   Social Connections: Not on file   Housing Stability: At Risk (2023)    Received from [facility]     Living Situation: Not on file     Housing Problems: Not on file       PE:  The patient does appear in his stated age in no distress.  The patient is  well groomed.    Psychiatric:  The patient is alert and oriented x 3.  The patient has a normal affect and mood.      Respiratory:  No acute respiratory distress. Patient does not have a cough.    HEENT:  Extraocular muscles are intact. There is no kern icterus. Pupils are equal, round, and reactive to light. No redness or discharge bilaterally.    Skin:  There are no rashes or lesions.    Lymph Nodes:  The patient has no palpable submandibular, supraclavicular, and cervical lymph nodes..    Vitals:  There were no vitals filed for this visit.    Cervical Spine:    Posture: mild chin forward superiorly rotated protracted shoulder posture.   Shoulders: Level   Head: In neutral   Spinous Processes Palpations: Non-tender for all Spinous Processes   Z-Joints Palpations: Tender at  left C2-3, left C3-4, and right C4-5   Muscular Palpations: Non-tender to palpation.   Cervical Flexion: 45 degrees Painless   Cervical Extension: 30 degrees Painless   RIGHT rotation: 60 degrees Gives the patient pain in the left neck   LEFT rotation: 60 degrees Gives the patient pain in the right neck     Vascular upper extremity:   Right radial pulses: 2+   Left radial pulses: 2+      Neurological Upper Extremity:    Light Touch: Intact in Bilateral upper extremities.   Pin Prick: Not tested.   UE Muscle Strength: All Upper Extremity strength measurements 5/5 except:  ABP Right: 4/5  ABP Left: 4+/5  FDI Right: 4-/5  FDI Left: 4/5  ADM Right: 4-/5  ADM Left: 4/5  EIP Right: 4-/5  EIP Left: 4/5   Reflexes: 2+ In the bilateral upper extremities.   Arias's sign Right: Negative   Arias's sigh Left: Negative     Radiology Imaging:  I reviewed with the patient his X-ray of the hip bilateral from 11/18/2024.      Assessment  1. C2-3 lt mild estee & central mild, C3-4 mild diffuse & lt mild-mod estee, C4-5 mild-mod diffuse, C5-6 rt mod/lt mild-mod estee & mod central, C6-7 bilat mod far lat & lt mild bulging discs      2. Primary osteoarthritis  of both hips: left mod-severe, right mild-mod    3. C6-7 bilateral mod foraminal, C5-6 right mos-severe/left mod, C3-4 left mild foraminal, C2-3 left mild-mod foraminal stenosis    4. Cubital tunnel syndrome on right    5. left chronic C7 & C8 and right chronic C8 radiculopathies    6. Ulnar neuropathy at elbow of left upper extremity: sensory    7. Right carpal tunnel syndrome: mild-moderate sensory        Plan  He will get back in to the PT for the cervical spine to see if this will help his headaches.    He will monitor his hip pain for now and will continue with his home exercise program.    He will follow up in 3-5 months or sooner if needed.    The patient understands and agrees with the stated plan.    Ryan Swain MD  2/3/2025

## 2025-02-03 NOTE — PATIENT INSTRUCTIONS
Plan  He will get back in to the PT for the cervical spine to see if this will help his headaches.    He will monitor his hip pain for now and will continue with his home exercise program.    He will follow up in 3-5 months or sooner if needed.

## 2025-03-25 ENCOUNTER — APPOINTMENT (OUTPATIENT)
Dept: PHYSICAL THERAPY | Facility: HOSPITAL | Age: 85
End: 2025-03-25
Attending: PHYSICAL MEDICINE & REHABILITATION
Payer: MEDICARE

## 2025-03-27 ENCOUNTER — OFFICE VISIT (OUTPATIENT)
Dept: PHYSICAL THERAPY | Facility: HOSPITAL | Age: 85
End: 2025-03-27
Attending: PHYSICAL MEDICINE & REHABILITATION
Payer: MEDICARE

## 2025-03-27 DIAGNOSIS — M50.90 CERVICAL DISC DISEASE: Primary | ICD-10-CM

## 2025-03-27 DIAGNOSIS — G56.01 RIGHT CARPAL TUNNEL SYNDROME: ICD-10-CM

## 2025-03-27 DIAGNOSIS — G56.21 CUBITAL TUNNEL SYNDROME ON RIGHT: ICD-10-CM

## 2025-03-27 DIAGNOSIS — M48.02 CERVICAL STENOSIS OF SPINE: ICD-10-CM

## 2025-03-27 DIAGNOSIS — G56.22 ULNAR NEUROPATHY AT ELBOW OF LEFT UPPER EXTREMITY: ICD-10-CM

## 2025-03-27 DIAGNOSIS — M54.12 CERVICAL RADICULOPATHY: ICD-10-CM

## 2025-03-27 PROCEDURE — 97162 PT EVAL MOD COMPLEX 30 MIN: CPT

## 2025-03-27 PROCEDURE — 97110 THERAPEUTIC EXERCISES: CPT

## 2025-03-27 NOTE — PROGRESS NOTES
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  He notes that headaches have been the main limiting issue.  History of current condition: at start of the year he began to have headaches.   Pain level: current 0 /10, at best 0 /10, at worst 4 /10  Description of symptoms: pain in the back of the head and upper neck   Occupation: retired   Leisure activities/Hobbies: walking, travel, carpentry   Prior level of function: no limitations in ADL's  Current limitations: Lifting, golfing, playing w grandson, reading  Pt goals: See goals below  Red flag signs/symptoms: Pt denies dizziness, drop attacks, dysphagia, dysarthria, diplopia; Pt denies changes in bowel/bladder function, saddle anesthesia; Pt denies pain that wakes in sleep, fever, recent trauma, history of CA, pain unchanged with movement/activity    Past medical history was reviewed with Lb.  Significant findings include: R torn meniscus of knee; atherosclerosis; diverticulosis; GERD; hiatal hernia; HLD; prediabetes; glaucoma; prostatectomy; thoracic and lumbar spondylosis  Imaging/Tests: MRI CERVICAL 4/5/2022    CONCLUSION:      1. Multilevel degenerative changes of the cervical spine as detailed.  Notable levels as follows:      2.  C2-C3:  Moderate left neural foraminal stenosis.    3. C5-C6:  Moderate right and mild left neural foraminal with mild spinal canal stenosis.    4. C6-C7:  Moderate bilateral neural foraminal and minor spinal canal stenosis.    5. C4-C5:  Mild to moderate right neural foraminal stenosis.      Lb  has a past medical history of Abnormal Heart Score CT (07/2020), Abnormal MRI, knee (05/2017), Acute torn meniscus of knee, Allergic rhinitis, Atherosclerosis of coronary artery (Partial blocked artery), Back problem, Mccrary esophagus (1995), Cancer (HCC) (2000), Colon polyps (2022), Coronary atherosclerosis, Diverticular disease (Unknown), Diverticulosis, GERD (gastroesophageal reflux disease), H/O colonoscopy (06/11/2012), Hearing loss, Heart disease (2019), Hemorrhoids (2017), Herpes labialis, Hiatal hernia (1999), High cholesterol, Hyperlipidemia, Kidney stones (1980), Lumbar degenerative disc disease, Lumbar herniated disc (1976), Mild CAD (2020), 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  He  has a past surgical history that includes tonsillectomy (1947); other surgical history (1980); other; colonoscopy (2007 2013); remv prostate,retropub,radical (2000); cataract (Left, 02/01/2019); cataract (Right, 03/01/2019); and sigmoidoscopy,diagnostic (Previous annual physicals).    ASSESSMENT  Lb presents to physical therapy evaluation with primary c/o neck tension and a catching sensation in the neck with movements.  He notes that headaches have been the main limiting issue.. The results of the objective tests and measures show postural alterations; decreased cervical and B shoulder ROM; decreased median nerve glides; and gait  alterations w mild B foot slap.     Functional deficits include but are not limited to Lifting, golfing, playing w grandson, reading. 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 Pt and PT discussed evaluation findings, pathology, POC and HEP.  Pt voiced understanding and performs HEP correctly without reported pain. Skilled Physical Therapy is medically necessary to address the above impairments and reach functional goals.    OBJECTIVE:   Objective Initial Evaluation Data 3/27/2025:    Musculoskeletal:  Observation/Posture:     Posture & Observation 3/27/2025    Hip flex, flattened lumbar spine, mild forward head position       Special tests:    Abdominals 3/27/2025   Tone good     Range of Motion:  Cervical ROM 3/27/2025   Fwd head 25   Flexion 60 nl 46   Extension 70 nl 40   R SB 45 nl 27   L SB 45 nl 20   R Rotation 80 nl 56   L Rotation 80 nl 60   *pain limiting     Shoulder ROM 3/27/2025   Flex R  180 nl 146   Flex L  180 nl 170   Extn R  50 nl 60   Extn L  50 nl 60   Abd R  180 nl 161   Abd L  180 nl 159   ER R  90 nl 60   ER L  90 nl 90   IR R  80 nl 21   IR L  80 nl 41   *pain limiting    Strength:  MMT 5/5 3/27/2025   C5 shldr abd R 5   Shldr abd L 5   C6 biceps R 5   Biceps L 5   C7 Triceps R 5   Triceps L 5   C8 EPL R 5   EPL L 5   T1 Interossei R 5   Interossei L 5   ER R 5   ER L 5   IR R 5   IR L 5   *pain limiting    Flexibility:  UE flexibility 3/27/2025   UT R Min-mod   UT L Min-mod   Scalenes R mod   Scalenes L mod   Lats R min   Lats L wnl     Neurological:  Sensation:  Dermatomes 3/27/2025       Lat neck (C4) R wnl   Lat neck (C4) L wnl   Ant deltoid (C5) R wnl   Ant deltoid (C5) L wnl   Dorsal prox thumb (C6) R wnl   Dorsal prox thumb (C6) L wnl    Dorsal prox 3 digit (C7) R wnl   Dorsal prox 3 digit (C7) L wnl   Dorsal 4-5 digits (C8) R wnl   Dorsal 4-5 digits (C8) L wnl   Med prox elbow (T1) R wnl   Med prox elbow (T1) L wnl     Peripheral Neurodynamic:  UE Neural Glides 3/27/2025   ULTT 1 R Min-mod   ULTT 1 L wnl   ULTT 2 R wnl   ULTT 2 L  wnl   ULTT 3 R wnl   ULTT 3 L wnl     Balance and Functional Mobility:  Gait: 3/27/2025    Hip flex, flat lumbar, mild B foot slap,      Therapeutic Exercise:  Visit #   1/8   Position Exercise HEP 3/20/2025   Supine Cerv rot ball H  X     Median nerve glide H X   Sitting  Median nerve glide H X    Standing Median nerve glide H  X    Ther Act Function  X beginning level educ on subocc and forward head position    H = HEP. Pt given copies of this exercise for home program.  X = Exercises done this date - pt verbalized understanding and demonstrated competence. All exercises done B unless otherwise indicated.  Pt advised to discontinue exercises that increase pain and to call or return to therapist to discuss.  Each intervention above is specifically prescribed to address the patients identified impairments, activity limitations, and participation restrictions.     Today's Treatment and Response:   Pt education was provided on exam findings, treatment diagnosis, treatment plan, expectations, and prognosis.  Today's Treatment       3/27/2025   Spine Treatment   Therapeutic Exercise See exercise grid   Therapeutic Exercise Minutes 12   Evaluation Minutes 33   Total Time Of Timed Procedures 12   Total Time Of Service-Based Procedures 33   Total Treatment Time 45        Charges:  PT EVAL:  ,    In agreement with evaluation findings and clinical rationale, this evaluation involved MODERATE COMPLEXITY decision making due to 1-2 personal factors/comorbidities, 3 or more body structures involved/activity limitations, and evolving symptoms as documented in the evaluation.                                                                          PLAN OF CARE:    Goals: (to be met in 15 visits)   Physical Therapy Goals:    1. Pt will be independent in beginning level of HEP for stretching, posture and strengthening.   2. Pt will be able to lift 25# without increased symptoms.  3. Pt will be able to play 18 holes of golf without increased symptoms.  4. Pt will be able to play w 25# grandson without increased symptoms.  5. Pt will be able to look down to read the newspaper without increased symptoms.      Frequency / Duration: Patient will be seen 1-2/weekx/week or a total of 15  visits over a 90 day period. Treatment will include: Manual Therapy; Mechanical Traction; Neuromuscular Re-education; Self-Care Home Management; Therapeutic Activities; Therapeutic Exercise; Home Exercise Program instruction; Electrical stimulation (unattended); Ultrasound; Patient/Family Education    Education or treatment limitation: None   Rehab Potential: good     Neck Disability Index Score  Score: (Patient-Rptd) 2 % (3/27/2025  8:44 AM)      Patient/Family/Caregiver was advised of these findings, precautions, and treatment options and has agreed to actively participate in planning and for this course of care.    Thank you for your referral. Please co-sign or sign and return this letter via fax as soon as possible to [phone]. If you have any questions, please contact me at Dept: [phone]    Sincerely,  Electronically signed by therapist: Debi Burk PT  Physician's certification required: Yes  I certify the need for these services furnished under this plan of treatment and while under my care.    X___________________________________________________ Date____________________    Certification From: 3/27/2025  To:6/25/2025

## 2025-04-01 ENCOUNTER — OFFICE VISIT (OUTPATIENT)
Dept: PHYSICAL THERAPY | Facility: HOSPITAL | Age: 85
End: 2025-04-01
Attending: PHYSICAL MEDICINE & REHABILITATION
Payer: MEDICARE

## 2025-04-01 PROCEDURE — 97110 THERAPEUTIC EXERCISES: CPT

## 2025-04-01 PROCEDURE — 97140 MANUAL THERAPY 1/> REGIONS: CPT

## 2025-04-01 NOTE — PROGRESS NOTES
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  He reports that he was able to swing a golf club recently.       Pain: 3/10       Visit count 1 2    Date 3/27/2025 4/1/2025     Cervical      Pain Range  0-4/10 0-2/10    Pain Ave --- 1/10    Pain Current 0/10 0/10       Objective:  DATA FROM INITIAL EVAL AND FURTHER ASSESSMENTS:     Musculoskeletal:  Observation/Posture:     Posture & Observation 3/27/2025     Hip flex, flattened lumbar spine, mild forward head position       Special tests:     Abdominals 3/27/2025   Tone good      Range of Motion:  Cervical ROM 3/27/2025   Fwd head 25   Flexion 60 nl 46   Extension 70 nl 40   R SB 45 nl 27   L SB 45 nl 20   R Rotation 80 nl 56   L Rotation 80 nl 60   *pain limiting      Shoulder ROM 3/27/2025   Flex R  180 nl 146   Flex L  180 nl 170   Extn R  50 nl 60   Extn L  50 nl 60   Abd R  180 nl 161   Abd L  180 nl 159   ER R  90 nl 60   ER L  90 nl 90   IR R  80 nl 21   IR L  80 nl 41   *pain limiting     Strength:  MMT 5/5 3/27/2025   C5 shldr abd R 5   Shldr abd L 5   C6 biceps R 5   Biceps L 5   C7 Triceps R 5   Triceps L 5   C8 EPL R 5   EPL L 5   T1 Interossei R 5   Interossei L 5   ER R 5   ER L 5   IR  R 5   IR L 5   *pain limiting     Flexibility:  UE flexibility 3/27/2025   UT R Min-mod   UT L Min-mod   Scalenes R mod   Scalenes L mod   Lats R min   Lats L wnl      Neurological:  Sensation:  Dermatomes 3/27/2025         Lat neck (C4) R wnl   Lat neck (C4) L wnl   Ant deltoid (C5) R wnl   Ant deltoid (C5) L wnl   Dorsal prox thumb (C6) R wnl   Dorsal prox thumb (C6) L wnl   Dorsal prox 3 digit (C7) R wnl   Dorsal prox 3 digit (C7) L wnl   Dorsal 4-5 digits (C8) R wnl   Dorsal 4-5 digits (C8) L wnl   Med prox elbow (T1) R wnl   Med prox elbow (T1) L wnl      Peripheral Neurodynamic:  UE Neural Glides 3/27/2025   ULTT 1 R Min-mod   ULTT 1 L wnl   ULTT 2 R wnl   ULTT 2 L  wnl   ULTT 3 R wnl   ULTT 3 L wnl      Balance and Functional Mobility:  Gait: 3/27/2025     Hip flex, flat lumbar, mild B foot slap,      Therapeutic Exercise:  Visit #     1/8 2/8   Position Exercise HEP 3/20/2025 4/1/2025    Supine Cerv rot ball H  X  X      Median nerve glide H X     Chin tuck neck retraction H  X   Sitting  Median nerve glide H X      Cervical flex H   X     Cervical flex w overpressure H   X    Standing Median nerve glide H  X     Ther Act Function   X beginning level educ on subocc and forward head position  X Pt educated on the use of opposite muscle groups to gain reflexive relaxation and improved neutral alignment motor control.     Man Tx Suboccipital release   X     Man tx   X     MET   X C0-1 ErotLSBR; FrotRSBL; C3, C5, C6 ERSR;    H = HEP. Pt given copies of this exercise for home program.  X = Exercises done this date - pt verbalized understanding and demonstrated competence. All exercises done B unless otherwise indicated.  Pt advised to discontinue exercises that increase pain and to call or return to therapist to discuss.  Each intervention above is specifically prescribed to address the patients identified impairments, activity limitations, and participation restrictions.      Assessment  Pt doing well with  beginning level exercises. Pt demonstrates significant restrictions in suboccipital musculature.  He tolerated deep tissue release noting initial trigger of headache and then relief of it.  Pt demonstrates segmental mobility limitations as noted above and tolerated manual therapy with >50% improvement in mobility after treatment.  Pt advised that man tx goal is to allow improved and ongoing mobility and strength.     Goals (to be met in 15 visits)   Physical Therapy Goals:    1. Pt will be independent in beginning level of HEP for stretching, posture and strengthening.   2. Pt will be able to lift 25# without increased symptoms.  3. Pt will be able to play 18 holes of golf without increased symptoms.  4. Pt will be able to play w 25# grandson without increased symptoms.  5. Pt will be able to look down to read the newspaper without increased symptoms.      Plan  Continue PT per original plan for therapeutic exercises, posture retraining, therapeutic activities, manual treatment, neuromuscular reeducation, therapeutic pain neuroscience education, patient education, modalities as needed.    Treatment Last 4 Visits  Treatment Day: 2       3/27/2025 4/1/2025   Spine Treatment   Therapeutic Exercise See exercise grid See exercise grid   Therapeutic Exercise Minutes 12 15   Manual Therapy Minutes  30   Evaluation Minutes 33    Total Time Of Timed Procedures 12 45   Total Time Of Service-Based Procedures 33 0   Total Treatment Time 45 45        HEP       Charges  MT2, TE1

## 2025-04-08 ENCOUNTER — APPOINTMENT (OUTPATIENT)
Dept: PHYSICAL THERAPY | Facility: HOSPITAL | Age: 85
End: 2025-04-08
Attending: PHYSICAL MEDICINE & REHABILITATION
Payer: MEDICARE

## 2025-04-08 PROCEDURE — 97110 THERAPEUTIC EXERCISES: CPT

## 2025-04-08 PROCEDURE — 97530 THERAPEUTIC ACTIVITIES: CPT

## 2025-04-08 PROCEDURE — 97140 MANUAL THERAPY 1/> REGIONS: CPT

## 2025-04-08 NOTE — PROGRESS NOTES
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  He feels like he is heading in the right direction.       Pain: 2/10     Objective          Visit count 1 2 3   Date 3/27/2025 4/1/2025  4/8/2025    Cervical      Pain Range  0-4/10 0-2/10 0-2/10   Pain Ave --- 1/10 1/10   Pain Current 0/10 0/10 0/10      Objective:  DATA FROM INITIAL EVAL AND FURTHER ASSESSMENTS:     Musculoskeletal:  Observation/Posture:     Posture & Observation 3/27/2025     Hip flex, flattened lumbar spine, mild forward head position       Special tests:     Abdominals 3/27/2025   Tone good      Deep Neck Flexor Endurance Test:  In seconds 4/8/2025   Deep Neck Flexors 40 stopped by PT   Normative data scores in healthy adults - Men: 38.9 seconds, Women: 29.4 seconds      Range of Motion:  Cervical ROM 3/27/2025   Fwd head 25   Flexion 60 nl 46   Extension 70 nl 40   R SB 45 nl 27   L SB 45 nl 20   R Rotation 80 nl 56   L Rotation 80 nl 60   *pain limiting      Shoulder ROM 3/27/2025   Flex R  180 nl 146   Flex L  180 nl 170   Extn R  50 nl 60   Extn L  50 nl 60   Abd R  180 nl 161   Abd L  180 nl 159   ER R  90 nl 60   ER L  90 nl 90   IR R  80 nl 21   IR L  80 nl 41   *pain limiting      Strength:  MMT 5/5 3/27/2025   C5 shldr abd R 5   Shldr abd L 5   C6 biceps R 5   Biceps L 5   C7 Triceps R 5   Triceps L 5   C8 EPL R 5   EPL L 5   T1 Interossei R 5   Interossei L 5   ER R 5   ER L 5   IR R 5   IR L 5   *pain limiting     Flexibility:  UE flexibility 3/27/2025   UT R Min-mod   UT L Min-mod   Scalenes R mod   Scalenes L mod   Lats R min   Lats L wnl      Neurological:  Sensation:  Dermatomes 3/27/2025         Lat neck (C4) R wnl   Lat neck (C4) L wnl   Ant deltoid (C5) R wnl   Ant deltoid (C5) L wnl   Dorsal prox thumb (C6) R wnl   Dorsal prox thumb (C6) L wnl   Dorsal prox 3 digit (C7) R wnl   Dorsal prox 3 digit (C7) L wnl   Dorsal 4-5 digits (C8) R wnl   Dorsal 4-5 digits (C8) L wnl   Med prox elbow (T1) R wnl   Med prox elbow (T1) L wnl      Peripheral Neurodynamic:  UE Neural Glides 3/27/2025   ULTT 1 R Min-mod   ULTT 1 L wnl   ULTT 2 R wnl   ULTT 2 L  wnl   ULTT 3 R wnl   ULTT 3 L wnl      Balance and Functional Mobility:  Gait: 3/27/2025     Hip flex, flat lumbar, mild B foot slap,      Therapeutic Exercise:  Visit #     1/8 2/8 3/8   Position Exercise HEP 3/20/2025 4/1/2025  4/8/2025    Supine Cerv rot ball H  X  X  X      Median nerve glide H X      Chin tuck neck retraction H  X X     Positional release for subocc H   X    Sitting  Median nerve glide H X       Cervical flex H   X  X     Cervical flex w overpressure H   X  X 5x10s    Trunk flex w cerv flex H    X     Cerv flex chin tuck w resist H   X 5x10s   Standing Median nerve glide H  X      Ther Act Function   X beginning level educ on subocc and forward head position  X Pt educated on the use of opposite muscle groups to gain reflexive relaxation and improved neutral alignment motor control.   X educ on subocc and balance of ant deep neck flexors, fascia nerve innervations, posture and muscle balanc   Man Tx Suboccipital release   X  X     Man tx   X  X     MET   X C0-1 ErotLSBR; FrotRSBL; C3, C5, C6 ERSR;     H = HEP. Pt given  copies of this exercise for home program.  X = Exercises done this date - pt verbalized understanding and demonstrated competence. All exercises done B unless otherwise indicated.  Pt advised to discontinue exercises that increase pain and to call or return to therapist to discuss.  Each intervention above is specifically prescribed to address the patients identified impairments, activity limitations, and participation restrictions.      Assessment  Pt continues to do well with low level of pain.  He has mild trunk flex positioning which is likely a beneficial compensation for lumbar spinal stenosis.  Discussed avoidance of hip flexor positioning to decrease upper cervical extension.  Pt assessed and demonstrates good deep neck flexor strength.  Pt instructed in self release of post tension.    Goals (to be met in 15 visits)   Physical Therapy Goals:    1. Pt will be independent in beginning level of HEP for stretching, posture and strengthening.   2. Pt will be able to lift 25# without increased symptoms.  3. Pt will be able to play 18 holes of golf without increased symptoms.  4. Pt will be able to play w 25# grandson without increased symptoms.  5. Pt will be able to look down to read the newspaper without increased symptoms.          Plan  Assess for readiness for discharge.  Continue PT per original plan for therapeutic exercises, posture retraining, therapeutic activities, manual treatment, neuromuscular reeducation, therapeutic pain neuroscience education, patient education, modalities as needed.    Treatment Last 4 Visits  Treatment Day: 3       3/27/2025 4/1/2025 4/8/2025   Spine Treatment   Therapeutic Exercise See exercise grid See exercise grid See exercise grid   Therapeutic Exercise Minutes 12 15 20   Manual Therapy Minutes  30 25   Therapeutic Activity Minutes   15   Evaluation Minutes 33     Total Time Of Timed Procedures 12 45 60   Total Time Of Service-Based Procedures 33 0 0   Total Treatment Time  45 45 60        21st Century Cures Act Notice to Patient: Medical documents like this are made available to patients in the interest of transparency. However, be advised this is a medical document and it is intended as [id number] communication between your medical providers. This medical document may contain abbreviations, assessments, medical data, and results or other terms that are unfamiliar. Medical documents are intended to carry relevant information, facts as evident, and the clinical opinion of the practitioner. As such, this medical document may be written in language that appears blunt or direct. You are encouraged to contact your medical provider and/or [facility] Patient Experience if you have any questions about this medical document.

## 2025-04-15 ENCOUNTER — APPOINTMENT (OUTPATIENT)
Dept: PHYSICAL THERAPY | Facility: HOSPITAL | Age: 85
End: 2025-04-15
Attending: PHYSICAL MEDICINE & REHABILITATION
Payer: MEDICARE

## 2025-04-22 ENCOUNTER — APPOINTMENT (OUTPATIENT)
Dept: PHYSICAL THERAPY | Facility: HOSPITAL | Age: 85
End: 2025-04-22
Attending: PHYSICAL MEDICINE & REHABILITATION
Payer: MEDICARE

## 2025-04-24 ENCOUNTER — OFFICE VISIT (OUTPATIENT)
Dept: PHYSICAL THERAPY | Facility: HOSPITAL | Age: 85
End: 2025-04-24
Attending: PHYSICAL MEDICINE & REHABILITATION
Payer: MEDICARE

## 2025-04-24 PROCEDURE — 97110 THERAPEUTIC EXERCISES: CPT

## 2025-04-24 PROCEDURE — 97530 THERAPEUTIC ACTIVITIES: CPT

## 2025-04-24 NOTE — PROGRESS NOTES
Discharge Summary    Pt has attended 4, cancelled 0, and no shown 0 visits in Physical Therapy.     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  He will have neck stiffness especially w prolonged walking with the dog.  He feels that he is ready for discharge to his home program.       Pain: 0/10   Visit count 1 2 3 4   Date 3/27/2025 4/1/2025  4/8/2025  4/24/2025    Cervical       Pain Range  0-4/10 0-2/10 0-2/10 0-2/10   Pain Ave --- 1/10 1/10 1/10   Pain Current 0/10 0/10 0/10 1/10        Pt has completed skilled physical therapy intervention with good decrease of pain complaints. Pt displays improved: cervical and B shoulder ROM; upper quadrant flexibility; R median nerve glide and tolerance for return to function    Pt wishes to discharge at this time due to low pain and good function.     Pt demonstrates improved function as noted as progression towards goals and improved functional score. See objective discharge data below in tables with initial evaluation data. Pt has met goals, is independent with HEP and ready to be discharged to [facility].     Post Neck Disability Index Score  Post Score: (Patient-Rptd) 0 % (4/24/2025  9:42 AM)    2 %  improvement               Objective:  DATA FROM INITIAL EVAL AND FURTHER ASSESSMENTS:     Musculoskeletal:  Observation/Posture:     Posture & Observation 3/27/2025     Hip flex, flattened lumbar spine, mild forward head position       Special tests:     Abdominals 3/27/2025   Tone good      Deep Neck Flexor Endurance Test:  In seconds 4/8/2025   Deep Neck Flexors 40 stopped by PT   Normative data scores in healthy adults - Men: 38.9 seconds, Women: 29.4 seconds      Range of Motion:  Cervical ROM 3/27/2025 4/24/2025    Fwd head 25 23   Flexion 60 nl 46 58   Extension 70 nl 40 53   R SB 45 nl 27 33   L SB 45 nl 20 22   R Rotation 80 nl 56 58   L Rotation 80 nl 60 60   *pain limiting      Shoulder ROM 3/27/2025 4/24/2025    Flex R  180 nl 146 160   Flex L  180 nl 170 174   Extn R  50 nl 60 60   Extn L  50 nl 60 60   Abd R  180 nl 161 167   Abd L  180 nl 159 180   ER R  90 nl 60 88   ER L  90 nl 90 90   IR R  80 nl 21 42   IR L  80 nl 41 65   *pain limiting     Strength:  MMT 5/5 3/27/2025   C5 shldr abd R 5   Shldr abd L 5   C6 biceps R 5   Biceps L 5   C7 Triceps R 5   Triceps L 5   C8 EPL R 5   EPL L 5   T1 Interossei R 5   Interossei L 5   ER R 5   ER L 5   IR R 5   IR L 5   *pain limiting     Flexibility:  UE flexibility 3/27/2025 4/24/2025    UT R Min-mod min   UT L Min-mod min   Scalenes R mod Min-mod   Scalenes L mod Min-mod   Lats R min Min(-)   Lats L wnl wnl      Neurological:  Sensation:  Dermatomes 3/27/2025         Lat neck (C4) R wnl   Lat neck (C4) L wnl   Ant deltoid (C5) R wnl   Ant deltoid (C5) L wnl   Dorsal prox thumb (C6) R wnl   Dorsal prox thumb (C6) L wnl   Dorsal prox 3 digit (C7) R wnl   Dorsal prox 3 digit (C7) L wnl   Dorsal 4-5 digits (C8) R wnl   Dorsal 4-5 digits (C8) L wnl   Med prox elbow (T1) R wnl   Med prox elbow (T1) L wnl      Peripheral Neurodynamic:  UE Neural Glides 3/27/2025 4/24/2025    ULTT 1 R Min-mod wnl   ULTT 1 L wnl wnl   ULTT 2 R wnl wnl   ULTT 2 L  wnl wnl   ULTT 3 R  wnl wnl   ULTT 3 L wnl wnl      Balance and Functional Mobility:  Gait: 3/27/2025     Hip flex, flat lumbar, mild B foot slap,      Therapeutic Exercise:  Visit #     2/8 3/8 4/8   Position Exercise HEP 4/1/2025 4/8/2025 4/24/2025    Supine Cerv rot ball H  X  X       Median nerve glide H       Chin tuck neck retraction H X X      Positional release for subocc H  X      Glut set    X     Bridging on bolster H   X 3x20s   Sidelying Sleeper stretch R shldr H   X     Trunk rotation book H   X 3x30s   Sitting  Median nerve glide H       Cervical flex H  X  X  X     Cervical flex w overpressure H  X  X 5x10s     Trunk flex w cerv flex H   X      Cerv flex chin tuck w resist H  X 5x10s X     Cerv rot    X     Cerv lat flex    X    Standing Median nerve glide H       Ther Act Function   X Pt educated on the use of opposite muscle groups to gain reflexive relaxation and improved neutral alignment motor control.   X educ on subocc and balance of ant deep neck flexors, fascia nerve innervations, posture and muscle balanc X educ on importance of thoracic rotation for neck and back; and hip extn positioning and control for cervical neutral.   Man Tx Suboccipital release  X  X      Man tx  X  X      MET  X C0-1 ErotLSBR; FrotRSBL; C3, C5, C6 ERSR;      H = HEP. Pt given copies of this exercise for home program.  X = Exercises done this date - pt verbalized understanding and demonstrated competence. All exercises done B unless otherwise indicated.  Pt advised to discontinue exercises that increase pain and to call or return to therapist to discuss.  Each intervention above is specifically prescribed to address the patients identified impairments, activity limitations, and participation restrictions.          Assessment    See above    Goals (to be met in 15 visits)   Physical Therapy Goals:    1. Pt will be independent in beginning level of HEP for stretching, posture and strengthening.   2. Pt will be able to lift 25# without  increased symptoms.  3. Pt will be able to play 18 holes of golf without increased symptoms.  4. Pt will be able to play w 25# grandson without increased symptoms.  5. Pt will be able to look down to read the newspaper without increased symptoms.      Plan     Discharge to [facility]    Thank you for your referral. If you have any questions, please contact me at Dept: [phone].    Sincerely,  Electronically signed by therapist: Debi Burk PT        Treatment Last 4 Visits  Treatment Day: 4       3/27/2025 4/1/2025 4/8/2025 4/24/2025   Spine Treatment   Therapeutic Exercise See exercise grid See exercise grid See exercise grid See exercise grid   Therapeutic Exercise Minutes 12 15 20    Manual Therapy Minutes  30 25    Therapeutic Activity Minutes   15    Evaluation Minutes 33      Total Time Of Timed Procedures 12 45 60 0   Total Time Of Service-Based Procedures 33 0 0 0   Total Treatment Time 45 45 60 0        21st Century Cures Act Notice to Patient: Medical documents like this are made available to patients in the interest of transparency. However, be advised this is a medical document and it is intended as sgyf-ni-swuh communication between your medical providers. This medical document may contain abbreviations, assessments, medical data, and results or other terms that are unfamiliar. Medical documents are intended to carry relevant information, facts as evident, and the clinical opinion of the practitioner. As such, this medical document may be written in language that appears blunt or direct. You are encouraged to contact your medical provider and/or [facility] Patient Experience if you have any questions about this medical document.

## 2025-04-29 ENCOUNTER — APPOINTMENT (OUTPATIENT)
Dept: PHYSICAL THERAPY | Facility: HOSPITAL | Age: 85
End: 2025-04-29
Attending: PHYSICAL MEDICINE & REHABILITATION
Payer: MEDICARE

## 2025-05-06 ENCOUNTER — APPOINTMENT (OUTPATIENT)
Dept: PHYSICAL THERAPY | Facility: HOSPITAL | Age: 85
End: 2025-05-06
Attending: PHYSICAL MEDICINE & REHABILITATION
Payer: MEDICARE

## 2025-05-13 ENCOUNTER — APPOINTMENT (OUTPATIENT)
Dept: PHYSICAL THERAPY | Facility: HOSPITAL | Age: 85
End: 2025-05-13
Attending: PHYSICAL MEDICINE & REHABILITATION
Payer: MEDICARE

## 2025-08-27 ENCOUNTER — TELEPHONE (OUTPATIENT)
Dept: PHYSICAL MEDICINE AND REHAB | Facility: CLINIC | Age: 85
End: 2025-08-27

## (undated) DIAGNOSIS — R73.03 PREDIABETES: Primary | ICD-10-CM

## (undated) DIAGNOSIS — Z11.52 ENCOUNTER FOR SCREENING FOR COVID-19: Primary | ICD-10-CM

## (undated) DIAGNOSIS — E78.00 HYPERCHOLESTEROLEMIA: ICD-10-CM

## (undated) NOTE — LETTER
21        To Whom It May Concern:      Scottie Terrazas  :  1940    []  Patient has been cleared to hold Aspirin 81 mg for 5 days for procedure: Platelet rich plasma injections into the left 1st CMC joint.  Holding the medication(s) may put th

## (undated) NOTE — MR AVS SNAPSHOT
[facility]  [address]  [phone]               Thank you for choosing us for your health care visit with Vanessa Toledo MD.  We are glad to serve you and happy to provide you with this summa 2 sprays by Nasal route daily as needed.    Commonly known as:  FLONASE           latanoprost 0.005 % Soln   Instill 1 drop by ophthalmic route every night into both eyes   Commonly known as:  XALATAN           PREVACID 15 MG Cpdr   Generic drug:  Rebecca Johnson

## (undated) NOTE — Clinical Note
5/9/2017    Patient: Teja Marrow   MR Number: [id number]   YOB: 1940   Date of Visit: 5/9/2017   Physician: Lila Lester MD     Dear Medicare Patient:  Mary Kay Nicko TO BENEFICIARY:  Please know that while a refraction is imp

## (undated) NOTE — LETTER
2/3/2025      Ryan Swain MD  Physical Medicine and Rehabilitation  [address] [place] [address], [address]  [facility] [address]  Dept: [phone]  Dept Fax: [phone]        RE: Consultation for Lb Pimentel        Dear Kolby Arvizu MD,    Thank you very much for the opportunity to see your patient.  Attached please find a summary from your patient's recent visit.     I appreciate the chance to take care of your patient with you.  Please feel free to call me with any questions or concerns.    Sincerely,        Ryan Swain MD  Electronically Signed on 2/3/2025

## (undated) NOTE — LETTER
6/5/2018    Patient: Dl Singer   MR Number: [id number]   YOB: 1940   Date of Visit: 6/5/2018   Physician: Prabhjot Villegas MD     Dear Medicare Patient:  Chet Vallecillo TO BENEFICIARY:  Please know that while a refraction is imp

## (undated) NOTE — MR AVS SNAPSHOT
[facility]  [address]  [phone]               Thank you for choosing us for your health care visit with Funmi Pepper MD.  We are glad to serve you and happy to provide you with this summa (he takes the drops in the left eye for glaucoma and in the right eye for ocular hypertension)   Visual field and OCT completed in office today with stable results that were discussed with patient in office today.             Ocular hypertension  Continue L If you've recently had a stay at the Hospital you can access your discharge instructions in [employer] by going to Visits < Admission Summaries.  If you've been to the Emergency Department or your doctor's office, you can view your past visit information in My

## (undated) NOTE — LETTER
[facility] SURGERY SCHEDULING FORM   [address] [facility]   [phone] (scheduling phone) [phone] (scheduling fax)     PATIENT INFORMATION   Last Name:      Gonzalez Simpler Name:    Lesli Gardiner Allergies: Patient has no known allergies. Completed by:     Stefanie VICENTE      Date:    8/9/2019

## (undated) NOTE — LETTER
1/31/2024      Ryan Swain MD  Physical Medicine and Rehabilitation  [address] [place] [address], [address]  [facility] [address]  Dept: [phone]  Dept Fax: [phone]        RE: Consultation for Lb Pimentel        Dear Ja Chamorro MD,    Thank you very much for the opportunity to see your patient.  Attached please find a summary from your patient's recent visit.     I appreciate the chance to take care of your patient with you.  Please feel free to call me with any questions or concerns.    Sincerely,        Ryan Swain MD  Electronically Signed on 1/31/2024

## (undated) NOTE — LETTER
AUTHORIZATION FOR SURGICAL OPERATION OR OTHER PROCEDURE    1.  I hereby authorize Dr. Piter Noyola and the [facility] staff assigned to my case to perform the following operation and/or procedure at the [facility]:     platelet rich plasma injections into the left ___________________________________________________               Responsible person  In case of minor or  Incompetent signature:  _______________________________________________    Statement of Physician  My signature below affirms that prior to the time

## (undated) NOTE — LETTER
[address], [place]  Authorization for Invasive Procedures  1.  I hereby authorize  Dr. Dottie Mazariegos  , my physician and whomever may be designated as the doctor's assistant, to perform the following operation and/or procedure: Cardiac Ca result of recieving a blood transfusion an/or blood producst. The following are some, but not all, of the potential risks that can occur: fever and allergic reactions, hemolytic reactions, transmission of disease such as hepatitis, AIDS, cytomegalovirus (C administration to me including the risks and benefits. I consent to the administration of sedation/analgesia as may be necessary or desirable in the judgment of my physician.      Signature of Patient:  ________________________________________________ Date:

## (undated) NOTE — MR AVS SNAPSHOT
[facility] [place]  [facility] [address] [address]  [phone]               Thank you for choosing us for your health care visit with Livier Brantley MD.  We are glad to serve you and happy to provide you with this summary of your visit.   Valerie may be held responsible for payment in full if proper authorization is not acquired. Please contact the Patient Business Office at [phone] if you have any questions related to insurance coverage. Thank you.          Reason for Today's Visit     Check Summaries. If you've been to the Emergency Department or your doctor's office, you can view your past visit information in [employer] by going to Visits < Visit Summaries. [employer] questions? Call [phone] for help.   [employer] is NOT to be used for urge

## (undated) NOTE — MR AVS SNAPSHOT
[facility] [place]  [address] [address]  [phone]               Thank you for choosing us for your health care visit with Sonja Neil MD.  We are glad to serve you and happy to provide you with this summary of your visit.   Valerie Medicare annual wellness visit, subsequent    -  Primary    Pre-diabetes        Rectal bleeding        Prostate cancer screening          Instructions and Information about [facility]on for a colonoscopy now due to rectal bleeding. If you've recently had a stay at the Hospital you can access your discharge instructions in [employer] by going to Visits < Admission Summaries.  If you've been to the Emergency Department or your doctor's office, you can view your past visit information in My

## (undated) NOTE — Clinical Note
5/9/2017              [address] [place] [address]         Dear Lewis Alvarado,    This medical office is an off-site department of [facility].  Since this is an offsite department of the Hospital you will recei 26948 Discission of secondary Cataract with Laser $70.92  81358 Correct Trichiasis, Epilation.  Forceps only  $12.50  [id number] Excision of Lesion, eyelid without closure or with simple    direct closure $36.84  16373 Closure of lacrimal punctum by plug, each e

## (undated) NOTE — LETTER
10/16/2024      Ryan Swain MD  Physical Medicine and Rehabilitation  [address] [place] [address], [address]  [facility] [address]  Dept: [phone]  Dept Fax: [phone]        RE: Consultation for Lb Pimentel        Dear Kolby Arvizu MD,    Thank you very much for the opportunity to see your patient.  Attached please find a summary from your patient's recent visit.     I appreciate the chance to take care of your patient with you.  Please feel free to call me with any questions or concerns.    Sincerely,        Ryan Swain MD  Electronically Signed on 10/16/2024